# Patient Record
Sex: FEMALE | Race: WHITE | NOT HISPANIC OR LATINO | Employment: UNEMPLOYED | URBAN - METROPOLITAN AREA
[De-identification: names, ages, dates, MRNs, and addresses within clinical notes are randomized per-mention and may not be internally consistent; named-entity substitution may affect disease eponyms.]

---

## 2018-06-13 ENCOUNTER — TRANSCRIBE ORDERS (OUTPATIENT)
Dept: ADMINISTRATIVE | Facility: HOSPITAL | Age: 58
End: 2018-06-13

## 2018-06-13 DIAGNOSIS — Z12.39 SCREENING BREAST EXAMINATION: Primary | ICD-10-CM

## 2018-07-27 ENCOUNTER — HOSPITAL ENCOUNTER (OUTPATIENT)
Dept: MAMMOGRAPHY | Facility: HOSPITAL | Age: 58
Discharge: HOME | End: 2018-07-27
Admitting: FAMILY MEDICINE

## 2018-07-27 DIAGNOSIS — Z12.39 SCREENING BREAST EXAMINATION: ICD-10-CM

## 2018-07-27 PROCEDURE — 77067 SCR MAMMO BI INCL CAD: CPT

## 2018-09-07 ENCOUNTER — TRANSCRIBE ORDERS (OUTPATIENT)
Dept: ADMINISTRATIVE | Facility: HOSPITAL | Age: 58
End: 2018-09-07

## 2018-09-07 DIAGNOSIS — R94.5 NONSPECIFIC ABNORMAL RESULTS OF LIVER FUNCTION STUDY: Primary | ICD-10-CM

## 2018-09-14 ENCOUNTER — TRANSCRIBE ORDERS (OUTPATIENT)
Dept: ADMINISTRATIVE | Facility: HOSPITAL | Age: 58
End: 2018-09-14

## 2018-09-14 ENCOUNTER — HOSPITAL ENCOUNTER (OUTPATIENT)
Dept: ULTRASOUND IMAGING | Facility: HOSPITAL | Age: 58
Discharge: HOME | End: 2018-09-14
Admitting: FAMILY MEDICINE

## 2018-09-14 DIAGNOSIS — M25.569 KNEE PAIN, UNSPECIFIED CHRONICITY, UNSPECIFIED LATERALITY: Primary | ICD-10-CM

## 2018-09-14 DIAGNOSIS — R94.5 NONSPECIFIC ABNORMAL RESULTS OF LIVER FUNCTION STUDY: ICD-10-CM

## 2018-09-14 PROCEDURE — 76700 US EXAM ABDOM COMPLETE: CPT

## 2018-09-18 ENCOUNTER — HOSPITAL ENCOUNTER (OUTPATIENT)
Dept: MRI IMAGING | Facility: HOSPITAL | Age: 58
Discharge: HOME | End: 2018-09-18
Attending: ORTHOPAEDIC SURGERY | Admitting: ORTHOPAEDIC SURGERY

## 2018-09-18 DIAGNOSIS — M25.569 KNEE PAIN, UNSPECIFIED CHRONICITY, UNSPECIFIED LATERALITY: ICD-10-CM

## 2018-09-18 PROCEDURE — 73721 MRI JNT OF LWR EXTRE W/O DYE: CPT

## 2018-11-15 ENCOUNTER — CONSULT (OUTPATIENT)
Dept: ONCOLOGY | Facility: CLINIC | Age: 58
End: 2018-11-15

## 2018-11-15 ENCOUNTER — LAB (OUTPATIENT)
Dept: OTHER | Facility: HOSPITAL | Age: 58
End: 2018-11-15

## 2018-11-15 VITALS
RESPIRATION RATE: 16 BRPM | TEMPERATURE: 97.9 F | HEART RATE: 77 BPM | OXYGEN SATURATION: 99 % | WEIGHT: 173 LBS | SYSTOLIC BLOOD PRESSURE: 132 MMHG | DIASTOLIC BLOOD PRESSURE: 80 MMHG | BODY MASS INDEX: 28.82 KG/M2 | HEIGHT: 65 IN

## 2018-11-15 DIAGNOSIS — Z83.2 FAMILY HISTORY OF PROTHROMBIN GENE MUTATION: ICD-10-CM

## 2018-11-15 DIAGNOSIS — R74.8 ELEVATED LIVER ENZYMES: ICD-10-CM

## 2018-11-15 DIAGNOSIS — E83.110 HEREDITARY HEMOCHROMATOSIS (HCC): Primary | ICD-10-CM

## 2018-11-15 DIAGNOSIS — R89.9 ABNORMAL LABORATORY TEST: Primary | ICD-10-CM

## 2018-11-15 LAB
BASOPHILS # BLD AUTO: 0.06 10*3/MM3 (ref 0–0.2)
BASOPHILS NFR BLD AUTO: 0.5 % (ref 0–1.5)
DEPRECATED RDW RBC AUTO: 42.1 FL (ref 37–54)
EOSINOPHIL # BLD AUTO: 0.09 10*3/MM3 (ref 0–0.7)
EOSINOPHIL NFR BLD AUTO: 0.8 % (ref 0.3–6.2)
ERYTHROCYTE [DISTWIDTH] IN BLOOD BY AUTOMATED COUNT: 12.7 % (ref 11.7–13)
FACTOR II, DNA ANALYSIS: ABNORMAL
HCT VFR BLD AUTO: 48 % (ref 35.6–45.5)
HGB BLD-MCNC: 15.4 G/DL (ref 11.9–15.5)
IMM GRANULOCYTES # BLD: 0.13 10*3/MM3 (ref 0–0.03)
IMM GRANULOCYTES NFR BLD: 1.1 % (ref 0–0.5)
LYMPHOCYTES # BLD AUTO: 3.31 10*3/MM3 (ref 0.9–4.8)
LYMPHOCYTES NFR BLD AUTO: 28.1 % (ref 19.6–45.3)
MCH RBC QN AUTO: 28.9 PG (ref 26.9–32)
MCHC RBC AUTO-ENTMCNC: 32.1 G/DL (ref 32.4–36.3)
MCV RBC AUTO: 90.1 FL (ref 80.5–98.2)
MONOCYTES # BLD AUTO: 0.72 10*3/MM3 (ref 0.2–1.2)
MONOCYTES NFR BLD AUTO: 6.1 % (ref 5–12)
NEUTROPHILS # BLD AUTO: 7.47 10*3/MM3 (ref 1.9–8.1)
NEUTROPHILS NFR BLD AUTO: 63.4 % (ref 42.7–76)
NRBC BLD MANUAL-RTO: 0 /100 WBC (ref 0–0)
PLATELET # BLD AUTO: 210 10*3/MM3 (ref 140–500)
PMV BLD AUTO: 9.2 FL (ref 6–12)
RBC # BLD AUTO: 5.33 10*6/MM3 (ref 3.9–5.2)
WBC NRBC COR # BLD: 11.78 10*3/MM3 (ref 4.5–10.7)

## 2018-11-15 PROCEDURE — 81240 F2 GENE: CPT | Performed by: INTERNAL MEDICINE

## 2018-11-15 PROCEDURE — 85025 COMPLETE CBC W/AUTO DIFF WBC: CPT | Performed by: INTERNAL MEDICINE

## 2018-11-15 PROCEDURE — 36415 COLL VENOUS BLD VENIPUNCTURE: CPT

## 2018-11-15 PROCEDURE — 99205 OFFICE O/P NEW HI 60 MIN: CPT | Performed by: INTERNAL MEDICINE

## 2018-11-15 RX ORDER — SODIUM CHLORIDE 9 MG/ML
250 INJECTION, SOLUTION INTRAVENOUS ONCE
Status: CANCELLED | OUTPATIENT
Start: 2018-11-28

## 2018-11-15 RX ORDER — CHOLECALCIFEROL (VITAMIN D3) 125 MCG
500 CAPSULE ORAL DAILY
COMMUNITY

## 2018-11-15 RX ORDER — ESOMEPRAZOLE MAGNESIUM 40 MG/1
40 CAPSULE, DELAYED RELEASE ORAL
COMMUNITY

## 2018-11-15 RX ORDER — CHOLECALCIFEROL (VITAMIN D3) 125 MCG
CAPSULE ORAL
COMMUNITY

## 2018-11-15 RX ORDER — ATORVASTATIN CALCIUM 40 MG/1
40 TABLET, FILM COATED ORAL DAILY
COMMUNITY

## 2018-11-15 RX ORDER — RANITIDINE 150 MG/1
150 TABLET ORAL 2 TIMES DAILY
COMMUNITY
End: 2022-11-26

## 2018-11-15 RX ORDER — TOPIRAMATE 50 MG/1
50 TABLET, FILM COATED ORAL 2 TIMES DAILY
COMMUNITY

## 2018-11-15 NOTE — PROGRESS NOTES
.     REASON FOR CONSULTATION:     Provide an opinion on any further workup or treatment of hereditary hemochromatosis.                              REQUESTING PHYSICIAN: Gage Colon MD     RECORDS OBTAINED:  Records of the patients history including those obtained from the referring provider were reviewed and summarized in detail.    HISTORY OF PRESENT ILLNESS:  The patient is a 58 y.o. year old female who is here for initial evaluation and treatment plan for newly diagnosed hereditary hemochromatosis.  This patient has a family history of hereditary hemachromatosis and her mother also had factor II/prothrombin gene mutation and the DVT.  This patient had examination at her primary care physician, Dr. Colon’ office, on 09/04/2018. Laboratory study at that time reported abnormal liver function panel including AST 86, ALT 99, alkaline phosphatase 121 with normal total bilirubin 0.4. She had total serum protein 7.7 and albumin 4.4. Her electrolytes were normal. Patient had elevated glucose 231. Her CBC showed mild erythrocytosis with RBC 5.3 million/mcL, upper normal hematocrit of 46.6% and hemoglobin 15.3%. Normal platelets at 87,000. Normal WBC 8400 including neutrophils 4100 and lymphocytes 3300, monocytes 500. Her ferritin level was elevated at 621 ng/mL, iron saturation 35% with free iron 406 and TIBC 302.  Normal B12 level 945, folic acid 11.5.  Hemoglobin A1c 7.0 and vitamin D 25.  Further laboratory study around 09/07/2018 reported negative for hepatitis B surface antigen, hepatitis B surface antibody, HCV antibody, HIV, and normal GGT 51.      Further studies on 10/16/2018 reported positive for homozygous HFE mutation at H63D.      This patient reports she has a family history of hemochromatosis and also prothrombin gene mutation.  Her mother had DVT and was found having factor II/prothrombin gene mutation.  One of her brothers and 1 of her sisters have hereditary hemochromatosis and both of them  are high patient for treatment with frequent phlebotomies.  The patient herself has never had thrombophilia previously.    The patient reports she had 2 term pregnancies.  She had menopause about 15 years ago.  She denies bleeding of any kind.  She was diagnosed with diabetes about 1-1/2 years ago.      The patient reports poor vision and intermittent diarrhea.  She has chronic low back pain and joint pain.  She has significant headaches.  No fainting or syncope.  She denies fevers, sweating, chills.          Past Medical History:   Diagnosis Date   • Allergic rhinitis    • Anemia    • Colon cancer (CMS/HCC)    • Depression    • Diabetes mellitus (CMS/HCC)    • Disease of thyroid gland    • GERD (gastroesophageal reflux disease)    • Hyperlipidemia    • IBS (irritable bowel syndrome)    • Iron overload    • Malignant neoplasm of cervix (CMS/HCC)    • Multiple sclerosis (CMS/HCC)    • RLS (restless legs syndrome)    • Seizures (CMS/HCC)    • Sensory neuropathy    • Vitamin D deficiency      Past Surgical History:   Procedure Laterality Date   •  SECTION      x2   • CYSTOSCOPY URETERAL BALLOON DILATATION      x4   • ELBOW PROCEDURE     • STOMACH SURGERY         HEMATOLOGIC/ONCOLOGIC HISTORY:  (History from previous dates can be found in the separate document.)    MEDICATIONS    Current Outpatient Medications:   •  amitriptyline (ELAVIL) 100 MG tablet, Take  by mouth., Disp: , Rfl:   •  atorvastatin (LIPITOR) 40 MG tablet, Take 40 mg by mouth Daily., Disp: , Rfl:   •  Butalbital-APAP-Caffeine -40 MG per capsule, Take  by mouth., Disp: , Rfl:   •  Cholecalciferol (VITAMIN D3) 2000 units tablet, Take  by mouth., Disp: , Rfl:   •  esomeprazole (nexIUM) 40 MG capsule, Take 40 mg by mouth Every Morning Before Breakfast., Disp: , Rfl:   •  gabapentin (NEURONTIN) 300 MG capsule, Take  by mouth., Disp: , Rfl:   •  levothyroxine (SYNTHROID, LEVOTHROID) 100 MCG tablet, Take  by mouth., Disp: , Rfl:   •  linagliptin  (TRADJENTA) 5 MG tablet tablet, Take 5 mg by mouth Daily., Disp: , Rfl:   •  metFORMIN (GLUCOPHAGE) 1000 MG tablet, Take 1,000 mg by mouth 2 (Two) Times a Day With Meals., Disp: , Rfl:   •  montelukast (SINGULAIR) 4 MG pack, Take  by mouth., Disp: , Rfl:   •  Omega-3 Fatty Acids (OMEGA 3 PO), Take  by mouth., Disp: , Rfl:   •  pramipexole (MIRAPEX) 0.125 MG tablet, Take 0.125 mg by mouth every night., Disp: , Rfl:   •  raNITIdine (ZANTAC) 150 MG tablet, Take 150 mg by mouth 2 (Two) Times a Day., Disp: , Rfl:   •  topiramate (TOPAMAX) 50 MG tablet, Take 50 mg by mouth 2 (Two) Times a Day., Disp: , Rfl:   •  vitamin B-12 (CYANOCOBALAMIN) 500 MCG tablet, Take 500 mcg by mouth Daily., Disp: , Rfl:     ALLERGIES:     Allergies   Allergen Reactions   • Sulfa Antibiotics        SOCIAL HISTORY:       Social History     Socioeconomic History   • Marital status: Single     Spouse name: Not on file   • Number of children: 2    • Years of education: High school education    • Highest education level: High school    Social Needs   • Financial resource strain: Not on file   • Food insecurity - worry: Not on file   • Food insecurity - inability: Not on file   • Transportation needs - medical: Not on file   • Transportation needs - non-medical: Not on file   Occupational History     Employer: DISABLED, previous had factory job    Tobacco Use   • Smoking status: Never Smoker   Substance and Sexual Activity   • Alcohol use: No   • Drug use: No    • Sexual activity: Not on file         FAMILY HISTORY:  Family History   Problem Relation Age of Onset   • Breast cancer Maternal Aunt    • Diabetes Other    • Heart disease Other    • Heart disease Mother         CABG x3   • Diabetes Mother         Type 2   • Lung cancer Father    • Liver cancer Brother      1 brother has hereditary hemochromatosis, liver cancer because HH post liver transplant, he is undergoing phlebotomy treatment.  A sister has hemochromatosis undergoing phlebotomy  "treatment.  Mother had a DVT, found to have prothrombin gene mutation.    REVIEW OF SYSTEMS:  Review of Systems   Constitutional: Positive for unexpected weight change (Weight gain). Negative for appetite change, diaphoresis and fatigue.   HENT: Negative for hearing loss, nosebleeds and sore throat.    Eyes: Positive for visual disturbance (Poor vision). Negative for photophobia.   Respiratory: Negative for cough, chest tightness and shortness of breath.    Cardiovascular: Negative for chest pain, palpitations and leg swelling.   Gastrointestinal: Positive for diarrhea. Negative for abdominal pain, blood in stool, constipation and nausea.   Endocrine: Negative for polyuria.   Genitourinary: Negative for dysuria and hematuria.   Musculoskeletal: Positive for arthralgias and back pain.   Skin: Negative for color change and rash.   Allergic/Immunologic: Negative for food allergies.   Neurological: Positive for weakness (Difficulty in walking) and headaches.   Hematological: Negative for adenopathy. Does not bruise/bleed easily.   Psychiatric/Behavioral: Negative for agitation and confusion.              Vitals:    11/15/18 1543   BP: 132/80   Pulse: 77   Resp: 16   Temp: 97.9 °F (36.6 °C)   SpO2: 99%  Comment: at rest   Weight: 78.5 kg (173 lb)  Comment: weigh with foot brace and boots   Height: 165 cm (64.96\")   PainSc:   8   PainLoc: Comment: all over     Current Status 11/15/2018   ECOG score 0      PHYSICAL EXAM:      CONSTITUTIONAL:  Well-developed well-nourished female.  No distress, looks comfortable.  EYES:  Conjunctiva and lids unremarkable.  PERRLA  EARS,NOSE,MOUTH,THROAT:  Ears and nose appear unremarkable.  Oral mucosa moist.  Lips appear unremarkable.  RESPIRATORY:  Normal respiratory effort.  Lungs clear to auscultation bilaterally.  CARDIOVASCULAR: Regular rhythm and rate.  Normal S1, S2.  No murmurs rubs or gallops.  No significant lower extremity edema.  GASTROINTESTINAL: Abdomen appears unremarkable.  " Nontender.  No hepatomegaly.  No splenomegaly.  Bowel sounds normal  LYMPHATIC:  No cervical, supraclavicular, axillary lymphadenopathy.  MUSCULOSKELETAL:  Unremarkable gait.  Unremarkable digits/nails.  No cyanosis or clubbing.  SKIN:  Warm.  No rashes.  The skin pigmentation.   PSYCHIATRIC:  Normal judgment and insight.  Normal mood and affect.      RECENT LABS:        WBC   Date Value Ref Range Status   11/15/2018 11.78 (H) 4.50 - 10.70 10*3/mm3 Final     Hemoglobin   Date Value Ref Range Status   11/15/2018 15.4 11.9 - 15.5 g/dL Final     Platelets   Date Value Ref Range Status   11/15/2018 210 140 - 500 10*3/mm3 Final     Ferritin 245 on 10/16/2018.        Assessment/Plan      1.  Hereditary hemochromatosis.  I discussed with the patient that she will need to start phlebotomy treatment because of elevated ferritin and more importantly she has evidence of liver injury from iron overload.  Because of the weather, it started snowing and she is the last patient this afternoon and that we would not be able to give her extra IV fluids, I think after phlebotomy she might have dizziness and would not be able to drive safely.  So I decided not to do phlebotomy today, instead to start her on phlebotomy after the Thanksgiving holiday.  I think she will need 2 phlebotomies to begin with and I will bring her back in 5 weeks for reevaluation.  We will check her laboratory studies the 1st day of phlebotomy including repeat iron panel, ferritin, CBC and CMP.  I will also recheck her iron when she comes back to see me in 5 weeks to assess her response to the phlebotomy treatment.  I explained to the patient the strict target would be ferritin 50, however, not every patient can tolerate that target. Of course we also have to look at other things such as her liver function panel and her physical tolerance to the phlebotomy treatment.  She voiced understanding.      2.  Family history of prothrombin gene mutation.  Her mother had  DVT.  I will check this patient for prothrombin gene mutation. This patient has no previous history of thrombosis.    3.  Patient will return in 5 weeks for M.D. evaluation, we'll check CBC and ferritin and iron profile.      TAQUERIA ZURITA M.D., Ph.D.    11/15/2018     CC:  Gage Cloon MD     Dictated using Dragon Dictation.

## 2018-11-28 ENCOUNTER — INFUSION (OUTPATIENT)
Dept: ONCOLOGY | Facility: HOSPITAL | Age: 58
End: 2018-11-28

## 2018-11-28 ENCOUNTER — LAB (OUTPATIENT)
Dept: OTHER | Facility: HOSPITAL | Age: 58
End: 2018-11-28

## 2018-11-28 VITALS
DIASTOLIC BLOOD PRESSURE: 75 MMHG | BODY MASS INDEX: 28.66 KG/M2 | WEIGHT: 172 LBS | HEART RATE: 75 BPM | TEMPERATURE: 97.8 F | OXYGEN SATURATION: 95 % | SYSTOLIC BLOOD PRESSURE: 113 MMHG

## 2018-11-28 DIAGNOSIS — E83.110 HEREDITARY HEMOCHROMATOSIS (HCC): ICD-10-CM

## 2018-11-28 DIAGNOSIS — E83.110 HEREDITARY HEMOCHROMATOSIS (HCC): Primary | ICD-10-CM

## 2018-11-28 DIAGNOSIS — R74.8 ELEVATED LIVER ENZYMES: ICD-10-CM

## 2018-11-28 LAB
ALBUMIN SERPL-MCNC: 4.1 G/DL (ref 3.5–5.2)
ALBUMIN/GLOB SERPL: 1.1 G/DL
ALP SERPL-CCNC: 118 U/L (ref 39–117)
ALT SERPL W P-5'-P-CCNC: 50 U/L (ref 1–33)
ANION GAP SERPL CALCULATED.3IONS-SCNC: 13 MMOL/L
AST SERPL-CCNC: 30 U/L (ref 1–32)
BASOPHILS # BLD AUTO: 0.04 10*3/MM3 (ref 0–0.2)
BASOPHILS NFR BLD AUTO: 0.6 % (ref 0–1.5)
BILIRUB SERPL-MCNC: 0.3 MG/DL (ref 0.1–1.2)
BUN BLD-MCNC: 16 MG/DL (ref 6–20)
BUN/CREAT SERPL: 25 (ref 7–25)
CALCIUM SPEC-SCNC: 9.6 MG/DL (ref 8.6–10.5)
CHLORIDE SERPL-SCNC: 106 MMOL/L (ref 98–107)
CO2 SERPL-SCNC: 20 MMOL/L (ref 22–29)
CREAT BLD-MCNC: 0.64 MG/DL (ref 0.57–1)
DEPRECATED RDW RBC AUTO: 41.4 FL (ref 37–54)
EOSINOPHIL # BLD AUTO: 0.07 10*3/MM3 (ref 0–0.7)
EOSINOPHIL NFR BLD AUTO: 1.1 % (ref 0.3–6.2)
ERYTHROCYTE [DISTWIDTH] IN BLOOD BY AUTOMATED COUNT: 12.6 % (ref 11.7–13)
FERRITIN SERPL-MCNC: 270.3 NG/ML (ref 13–150)
GFR SERPL CREATININE-BSD FRML MDRD: 96 ML/MIN/1.73
GLOBULIN UR ELPH-MCNC: 3.9 GM/DL
GLUCOSE BLD-MCNC: 198 MG/DL (ref 65–99)
HCT VFR BLD AUTO: 46.4 % (ref 35.6–45.5)
HGB BLD-MCNC: 15 G/DL (ref 11.9–15.5)
IMM GRANULOCYTES # BLD: 0.04 10*3/MM3 (ref 0–0.03)
IMM GRANULOCYTES NFR BLD: 0.6 % (ref 0–0.5)
IRON 24H UR-MRATE: 79 MCG/DL (ref 37–145)
IRON SATN MFR SERPL: 20 % (ref 20–50)
LYMPHOCYTES # BLD AUTO: 2.36 10*3/MM3 (ref 0.9–4.8)
LYMPHOCYTES NFR BLD AUTO: 36.3 % (ref 19.6–45.3)
MCH RBC QN AUTO: 28.8 PG (ref 26.9–32)
MCHC RBC AUTO-ENTMCNC: 32.3 G/DL (ref 32.4–36.3)
MCV RBC AUTO: 89.2 FL (ref 80.5–98.2)
MONOCYTES # BLD AUTO: 0.44 10*3/MM3 (ref 0.2–1.2)
MONOCYTES NFR BLD AUTO: 6.8 % (ref 5–12)
NEUTROPHILS # BLD AUTO: 3.55 10*3/MM3 (ref 1.9–8.1)
NEUTROPHILS NFR BLD AUTO: 54.6 % (ref 42.7–76)
NRBC BLD MANUAL-RTO: 0 /100 WBC (ref 0–0)
PLATELET # BLD AUTO: 275 10*3/MM3 (ref 140–500)
PMV BLD AUTO: 8.6 FL (ref 6–12)
POTASSIUM BLD-SCNC: 4.1 MMOL/L (ref 3.5–5.2)
PROT SERPL-MCNC: 8 G/DL (ref 6–8.5)
RBC # BLD AUTO: 5.2 10*6/MM3 (ref 3.9–5.2)
SODIUM BLD-SCNC: 139 MMOL/L (ref 136–145)
TIBC SERPL-MCNC: 386 MCG/DL (ref 298–536)
TRANSFERRIN SERPL-MCNC: 259 MG/DL (ref 200–360)
WBC NRBC COR # BLD: 6.5 10*3/MM3 (ref 4.5–10.7)

## 2018-11-28 PROCEDURE — 82728 ASSAY OF FERRITIN: CPT | Performed by: INTERNAL MEDICINE

## 2018-11-28 PROCEDURE — 85025 COMPLETE CBC W/AUTO DIFF WBC: CPT | Performed by: INTERNAL MEDICINE

## 2018-11-28 PROCEDURE — 84466 ASSAY OF TRANSFERRIN: CPT | Performed by: INTERNAL MEDICINE

## 2018-11-28 PROCEDURE — 83540 ASSAY OF IRON: CPT | Performed by: INTERNAL MEDICINE

## 2018-11-28 PROCEDURE — 36415 COLL VENOUS BLD VENIPUNCTURE: CPT

## 2018-11-28 PROCEDURE — 80053 COMPREHEN METABOLIC PANEL: CPT | Performed by: INTERNAL MEDICINE

## 2018-11-28 PROCEDURE — 99195 PHLEBOTOMY: CPT | Performed by: NURSE PRACTITIONER

## 2018-11-28 RX ORDER — SODIUM CHLORIDE 9 MG/ML
250 INJECTION, SOLUTION INTRAVENOUS ONCE
Status: COMPLETED | OUTPATIENT
Start: 2018-11-28 | End: 2018-11-28

## 2018-11-28 RX ORDER — SODIUM CHLORIDE 9 MG/ML
250 INJECTION, SOLUTION INTRAVENOUS ONCE
Status: CANCELLED | OUTPATIENT
Start: 2018-11-28

## 2018-11-28 RX ADMIN — SODIUM CHLORIDE 250 ML: 900 INJECTION, SOLUTION INTRAVENOUS at 13:10

## 2018-11-28 NOTE — PROGRESS NOTES
Pt here for first phlebotomy. Per Dr Waddell note, adminestered 250cc NS following phlebo. Pt tolerated treatment well. She inquired about labs done at last MD visit, specifically Factor II eval. Reviewed results with Dr. Greenberg. Pt does have mutation. Per Dr. Greenberg, reviewed result with patient and discussed that there is not need for treatment and as of now, the only intervention would be to avoid estrogen containing medications. Pt states her sister has this same mutation and that was the information she had also shared with her. Given copy of labs from today, as well as Factor II result.

## 2018-12-07 DIAGNOSIS — R74.8 ELEVATED LIVER ENZYMES: ICD-10-CM

## 2018-12-07 DIAGNOSIS — E83.110 HEREDITARY HEMOCHROMATOSIS (HCC): Primary | ICD-10-CM

## 2018-12-07 DIAGNOSIS — Z83.2 FAMILY HISTORY OF PROTHROMBIN GENE MUTATION: ICD-10-CM

## 2018-12-11 ENCOUNTER — INFUSION (OUTPATIENT)
Dept: ONCOLOGY | Facility: HOSPITAL | Age: 58
End: 2018-12-11

## 2018-12-11 ENCOUNTER — LAB (OUTPATIENT)
Dept: OTHER | Facility: HOSPITAL | Age: 58
End: 2018-12-11

## 2018-12-11 VITALS — HEART RATE: 80 BPM | DIASTOLIC BLOOD PRESSURE: 70 MMHG | TEMPERATURE: 98.1 F | SYSTOLIC BLOOD PRESSURE: 113 MMHG

## 2018-12-11 VITALS — BODY MASS INDEX: 26.66 KG/M2 | WEIGHT: 160 LBS

## 2018-12-11 DIAGNOSIS — E83.110 HEREDITARY HEMOCHROMATOSIS (HCC): ICD-10-CM

## 2018-12-11 PROCEDURE — 99195 PHLEBOTOMY: CPT | Performed by: NURSE PRACTITIONER

## 2018-12-20 ENCOUNTER — OFFICE VISIT (OUTPATIENT)
Dept: ONCOLOGY | Facility: CLINIC | Age: 58
End: 2018-12-20

## 2018-12-20 ENCOUNTER — INFUSION (OUTPATIENT)
Dept: ONCOLOGY | Facility: HOSPITAL | Age: 58
End: 2018-12-20

## 2018-12-20 ENCOUNTER — LAB (OUTPATIENT)
Dept: OTHER | Facility: HOSPITAL | Age: 58
End: 2018-12-20

## 2018-12-20 VITALS
HEIGHT: 65 IN | RESPIRATION RATE: 18 BRPM | OXYGEN SATURATION: 98 % | TEMPERATURE: 98.6 F | SYSTOLIC BLOOD PRESSURE: 109 MMHG | DIASTOLIC BLOOD PRESSURE: 76 MMHG | HEART RATE: 81 BPM | BODY MASS INDEX: 29.09 KG/M2 | WEIGHT: 174.6 LBS

## 2018-12-20 VITALS — HEART RATE: 83 BPM | DIASTOLIC BLOOD PRESSURE: 64 MMHG | SYSTOLIC BLOOD PRESSURE: 98 MMHG

## 2018-12-20 DIAGNOSIS — R74.8 ELEVATED LIVER ENZYMES: ICD-10-CM

## 2018-12-20 DIAGNOSIS — E83.110 HEREDITARY HEMOCHROMATOSIS (HCC): Primary | ICD-10-CM

## 2018-12-20 DIAGNOSIS — Z83.2 FAMILY HISTORY OF PROTHROMBIN GENE MUTATION: ICD-10-CM

## 2018-12-20 DIAGNOSIS — E83.110 HEREDITARY HEMOCHROMATOSIS (HCC): ICD-10-CM

## 2018-12-20 DIAGNOSIS — D68.52 PROTHROMBIN GENE MUTATION (HCC): ICD-10-CM

## 2018-12-20 LAB
ALBUMIN SERPL-MCNC: 4.3 G/DL (ref 3.5–5.2)
ALBUMIN/GLOB SERPL: 1.3 G/DL
ALP SERPL-CCNC: 116 U/L (ref 39–117)
ALT SERPL W P-5'-P-CCNC: 28 U/L (ref 1–33)
ANION GAP SERPL CALCULATED.3IONS-SCNC: 13.1 MMOL/L
AST SERPL-CCNC: 21 U/L (ref 1–32)
BASOPHILS # BLD AUTO: 0.04 10*3/MM3 (ref 0–0.2)
BASOPHILS NFR BLD AUTO: 0.5 % (ref 0–1.5)
BILIRUB SERPL-MCNC: 0.4 MG/DL (ref 0.1–1.2)
BUN BLD-MCNC: 16 MG/DL (ref 6–20)
BUN/CREAT SERPL: 21.3 (ref 7–25)
CALCIUM SPEC-SCNC: 9.6 MG/DL (ref 8.6–10.5)
CHLORIDE SERPL-SCNC: 107 MMOL/L (ref 98–107)
CO2 SERPL-SCNC: 22.9 MMOL/L (ref 22–29)
CREAT BLD-MCNC: 0.75 MG/DL (ref 0.57–1)
DEPRECATED RDW RBC AUTO: 44.5 FL (ref 37–54)
EOSINOPHIL # BLD AUTO: 0.15 10*3/MM3 (ref 0–0.7)
EOSINOPHIL NFR BLD AUTO: 1.9 % (ref 0.3–6.2)
ERYTHROCYTE [DISTWIDTH] IN BLOOD BY AUTOMATED COUNT: 13.5 % (ref 11.7–13)
FERRITIN SERPL-MCNC: 64.3 NG/ML (ref 13–150)
GFR SERPL CREATININE-BSD FRML MDRD: 80 ML/MIN/1.73
GLOBULIN UR ELPH-MCNC: 3.3 GM/DL
GLUCOSE BLD-MCNC: 97 MG/DL (ref 65–99)
HCT VFR BLD AUTO: 41 % (ref 35.6–45.5)
HGB BLD-MCNC: 13.3 G/DL (ref 11.9–15.5)
IMM GRANULOCYTES # BLD AUTO: 0.03 10*3/MM3 (ref 0–0.03)
IMM GRANULOCYTES NFR BLD AUTO: 0.4 % (ref 0–0.5)
IRON 24H UR-MRATE: 71 MCG/DL (ref 37–145)
IRON SATN MFR SERPL: 19 % (ref 20–50)
LYMPHOCYTES # BLD AUTO: 2.69 10*3/MM3 (ref 0.9–4.8)
LYMPHOCYTES NFR BLD AUTO: 33.6 % (ref 19.6–45.3)
MCH RBC QN AUTO: 29.2 PG (ref 26.9–32)
MCHC RBC AUTO-ENTMCNC: 32.4 G/DL (ref 32.4–36.3)
MCV RBC AUTO: 90.1 FL (ref 80.5–98.2)
MONOCYTES # BLD AUTO: 0.63 10*3/MM3 (ref 0.2–1.2)
MONOCYTES NFR BLD AUTO: 7.9 % (ref 5–12)
NEUTROPHILS # BLD AUTO: 4.47 10*3/MM3 (ref 1.9–8.1)
NEUTROPHILS NFR BLD AUTO: 55.7 % (ref 42.7–76)
NRBC BLD AUTO-RTO: 0 /100 WBC (ref 0–0)
PLATELET # BLD AUTO: 237 10*3/MM3 (ref 140–500)
PMV BLD AUTO: 8.5 FL (ref 6–12)
POTASSIUM BLD-SCNC: 4.1 MMOL/L (ref 3.5–5.2)
PROT SERPL-MCNC: 7.6 G/DL (ref 6–8.5)
RBC # BLD AUTO: 4.55 10*6/MM3 (ref 3.9–5.2)
SODIUM BLD-SCNC: 143 MMOL/L (ref 136–145)
TIBC SERPL-MCNC: 375 MCG/DL (ref 298–536)
TRANSFERRIN SERPL-MCNC: 252 MG/DL (ref 200–360)
WBC NRBC COR # BLD: 8.01 10*3/MM3 (ref 4.5–10.7)

## 2018-12-20 PROCEDURE — 96360 HYDRATION IV INFUSION INIT: CPT | Performed by: INTERNAL MEDICINE

## 2018-12-20 PROCEDURE — 82728 ASSAY OF FERRITIN: CPT | Performed by: INTERNAL MEDICINE

## 2018-12-20 PROCEDURE — 83540 ASSAY OF IRON: CPT | Performed by: INTERNAL MEDICINE

## 2018-12-20 PROCEDURE — 99214 OFFICE O/P EST MOD 30 MIN: CPT | Performed by: INTERNAL MEDICINE

## 2018-12-20 PROCEDURE — 80053 COMPREHEN METABOLIC PANEL: CPT | Performed by: INTERNAL MEDICINE

## 2018-12-20 PROCEDURE — 84466 ASSAY OF TRANSFERRIN: CPT | Performed by: INTERNAL MEDICINE

## 2018-12-20 PROCEDURE — 85025 COMPLETE CBC W/AUTO DIFF WBC: CPT | Performed by: INTERNAL MEDICINE

## 2018-12-20 PROCEDURE — 36415 COLL VENOUS BLD VENIPUNCTURE: CPT

## 2018-12-20 RX ORDER — SODIUM CHLORIDE 9 MG/ML
250 INJECTION, SOLUTION INTRAVENOUS ONCE
Status: COMPLETED | OUTPATIENT
Start: 2018-12-20 | End: 2018-12-20

## 2018-12-20 RX ORDER — SODIUM CHLORIDE 9 MG/ML
250 INJECTION, SOLUTION INTRAVENOUS ONCE
Status: CANCELLED | OUTPATIENT
Start: 2018-12-20

## 2018-12-20 RX ADMIN — SODIUM CHLORIDE 250 ML: 900 INJECTION, SOLUTION INTRAVENOUS at 13:43

## 2018-12-20 NOTE — PROGRESS NOTES
.     REASON FOR CONSULTATION:     1. Hereditary hemochromatosis positive for homozygous HFE mutation H63D.  Patient had a elevated liver function panel, she was started on phlebotomy on 11/28/2018.   2.  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  Positive family history.   .                                  HISTORY OF PRESENT ILLNESS:  The patient is a 58 y.o. year old female who is here for reevaluation to assess her response to phlebotomy.  I saw her initially on 11/15/2018.  Since that time she had a 2 phlebotomy and IV normal saline infusion, and she has good tolerance.  She reports no fainting no dizziness.  This patient was also tested positive for heterozygous factor II mutation on 11/15/2018.    Pretreatment iron study reported at ferritin 270 ng/mL, free iron 79 TIBC 386 saturation 20% on 11/28/2018.  Hb 15.0, ,000 and WBC 6500.  Liver panel ALT 50 AST 30 alkaline phosphatase was 118 and the bilirubin 0.3.    Laboratory study today reported much improved ferritin 64.3, free iron 71 TIBC 375 and iron saturation 19%.  Hb 13.3, normal WBC and platelets.  Normalized ALT 28, AST 21, alkaline phosphatase is 116 and total bilirubin 0.4.    The patient reports poor vision and intermittent diarrhea.  She has chronic low back pain and joint pain.  She has significant headaches.  No fainting or syncope.  She denies fevers, sweating, chills.          Past Medical History:   Diagnosis Date   • Allergic rhinitis    • Anemia    • Colon cancer (CMS/HCC)    • Depression    • Diabetes mellitus (CMS/HCC)    • Disease of thyroid gland    • GERD (gastroesophageal reflux disease)    • Hyperlipidemia    • IBS (irritable bowel syndrome)    • Iron overload    • Malignant neoplasm of cervix (CMS/HCC)    • Multiple sclerosis (CMS/HCC)    • RLS (restless legs syndrome)    • Seizures (CMS/HCC)    • Sensory neuropathy    • Vitamin D deficiency      Past Surgical  History:   Procedure Laterality Date   •  SECTION      x2   • CYSTOSCOPY URETERAL BALLOON DILATATION      x4   • ELBOW PROCEDURE     • STOMACH SURGERY         HEMATOLOGIC/ONCOLOGIC HISTORY:  The patient is a 58 y.o. year old female who is here on 11/15/2008 for initial evaluation and treatment plan for newly diagnosed hereditary hemochromatosis.      This patient has a family history of hereditary hemachromatosis and her mother also had factor II/prothrombin gene mutation and history of DVT.  This patient had examination at her primary care physician, Dr. Colon’ office, on 2018. Laboratory study at that time reported abnormal liver function panel including AST 86, ALT 99, alkaline phosphatase 121 with normal total bilirubin 0.4. She had total serum protein 7.7 and albumin 4.4. Her electrolytes were normal. Patient had elevated glucose 231. Her CBC showed mild erythrocytosis with RBC 5.3 million/mcL, upper normal hematocrit of 46.6% and hemoglobin 15.3%. Normal platelets at 87,000. Normal WBC 8400 including neutrophils 4100 and lymphocytes 3300, monocytes 500. Her ferritin level was elevated at 621 ng/mL, iron saturation 35% with free iron 406 and TIBC 302.  Normal B12 level 945, folic acid 11.5.  Hemoglobin A1c 7.0 and vitamin D 25.  Further laboratory study around 2018 reported negative for hepatitis B surface antigen, hepatitis B surface antibody, HCV antibody, HIV, and normal GGT 51.      Further studies on 10/16/2018 reported positive for homozygous HFE mutation at H63D.      This patient reports she has a family history of hemochromatosis and also prothrombin gene mutation.  Her mother had DVT and was found having factor II/prothrombin gene mutation.  One of her brothers and 1 of her sisters have hereditary hemochromatosis and both of them are high patient for treatment with frequent phlebotomies.  The patient herself has never had thrombophilia previously.    The patient reports she had 2  term pregnancies.  She had menopause about 15 years ago.  She denies bleeding of any kind.  She was diagnosed with diabetes about 1-1/2 years ago.      The patient reports poor vision and intermittent diarrhea.  She has chronic low back pain and joint pain.  She has significant headaches.  No fainting or syncope.  She denies fevers, sweating, chills.       MEDICATIONS    Current Outpatient Medications:   •  amitriptyline (ELAVIL) 100 MG tablet, Take  by mouth., Disp: , Rfl:   •  atorvastatin (LIPITOR) 40 MG tablet, Take 40 mg by mouth Daily., Disp: , Rfl:   •  Butalbital-APAP-Caffeine -40 MG per capsule, Take  by mouth., Disp: , Rfl:   •  Cholecalciferol (VITAMIN D3) 2000 units tablet, Take  by mouth., Disp: , Rfl:   •  esomeprazole (nexIUM) 40 MG capsule, Take 40 mg by mouth Every Morning Before Breakfast., Disp: , Rfl:   •  gabapentin (NEURONTIN) 300 MG capsule, Take  by mouth., Disp: , Rfl:   •  levothyroxine (SYNTHROID, LEVOTHROID) 100 MCG tablet, Take  by mouth., Disp: , Rfl:   •  linagliptin (TRADJENTA) 5 MG tablet tablet, Take 5 mg by mouth Daily., Disp: , Rfl:   •  metFORMIN (GLUCOPHAGE) 1000 MG tablet, Take 1,000 mg by mouth 2 (Two) Times a Day With Meals., Disp: , Rfl:   •  montelukast (SINGULAIR) 4 MG pack, Take  by mouth., Disp: , Rfl:   •  Omega-3 Fatty Acids (OMEGA 3 PO), Take  by mouth., Disp: , Rfl:   •  pramipexole (MIRAPEX) 0.125 MG tablet, Take 0.125 mg by mouth every night., Disp: , Rfl:   •  raNITIdine (ZANTAC) 150 MG tablet, Take 150 mg by mouth 2 (Two) Times a Day., Disp: , Rfl:   •  topiramate (TOPAMAX) 50 MG tablet, Take 50 mg by mouth 2 (Two) Times a Day., Disp: , Rfl:   •  vitamin B-12 (CYANOCOBALAMIN) 500 MCG tablet, Take 500 mcg by mouth Daily., Disp: , Rfl:   No current facility-administered medications for this visit.     Facility-Administered Medications Ordered in Other Visits:   •  Sodium chloride 0.9 % infusion 250 mL, 250 mL, Intravenous, Once, Marisel Greenberg MD  PhD    ALLERGIES:     Allergies   Allergen Reactions   • Sulfa Antibiotics        SOCIAL HISTORY:       Social History     Socioeconomic History   • Marital status: Single     Spouse name: Not on file   • Number of children: 2    • Years of education: High school education    • Highest education level: High school    Social Needs   • Financial resource strain: Not on file   • Food insecurity - worry: Not on file   • Food insecurity - inability: Not on file   • Transportation needs - medical: Not on file   • Transportation needs - non-medical: Not on file   Occupational History     Employer: DISABLED, previous had factory job    Tobacco Use   • Smoking status: Never Smoker   Substance and Sexual Activity   • Alcohol use: No   • Drug use: No    • Sexual activity: Not on file         FAMILY HISTORY:  Family History   Problem Relation Age of Onset   • Breast cancer Maternal Aunt    • Diabetes Other    • Heart disease Other    • Heart disease Mother         CABG x3   • Diabetes Mother         Type 2   • Hypertension Mother    • Lung cancer Father    • Liver cancer Brother 60   • Diabetes Brother      1 brother has hereditary hemochromatosis, liver cancer because HH post liver transplant, he is undergoing phlebotomy treatment.  A sister has hemochromatosis undergoing phlebotomy treatment.  Mother had a DVT, found to have prothrombin gene mutation.    REVIEW OF SYSTEMS:  Review of Systems   Constitutional: Positive for unexpected weight change (Weight gain). Negative for appetite change, diaphoresis and fatigue.   HENT: Negative for hearing loss, nosebleeds and sore throat.    Eyes: Positive for visual disturbance (Poor vision). Negative for photophobia.   Respiratory: Negative for cough, chest tightness and shortness of breath.    Cardiovascular: Negative for chest pain, palpitations and leg swelling.   Gastrointestinal: Positive for diarrhea. Negative for abdominal pain, blood in stool, constipation and nausea.  "  Endocrine: Negative for polyuria.   Genitourinary: Negative for dysuria and hematuria.   Musculoskeletal: Positive for arthralgias and back pain.   Skin: Negative for color change and rash.   Allergic/Immunologic: Negative for food allergies.   Neurological: Positive for weakness and headaches.   Hematological: Negative for adenopathy. Does not bruise/bleed easily.   Psychiatric/Behavioral: Negative for agitation and confusion.              Vitals:    12/20/18 1249   BP: 109/76   Pulse: 81   Resp: 18   Temp: 98.6 °F (37 °C)   SpO2: 98%   Weight: 79.2 kg (174 lb 9.6 oz)   Height: 165 cm (64.96\")   PainSc:   8   PainLoc: Comment: pt has neuropathy     Current Status 12/20/2018   ECOG score 0          PHYSICAL EXAM:      CONSTITUTIONAL:  Well-developed well-nourished female.  No distress, looks comfortable.  EYES:  Conjunctiva and lids unremarkable.  PERRLA  EARS,NOSE,MOUTH,THROAT:  nose appear unremarkable.  Oral mucosa moist.  Lips appear unremarkable.  RESPIRATORY:  Normal respiratory effort.  Lungs clear to auscultation bilaterally.  CARDIOVASCULAR: Regular rhythm and rate.  Normal S1, S2.  No murmurs.   GASTROINTESTINAL: Abdomen appears unremarkable.  Nontender.  No hepatomegaly.  No splenomegaly.  Bowel sounds normal.   LYMPHATIC:  No cervical, supraclavicular lymphadenopathy.  MUSCULOSKELETAL:  Normal gait.  No cyanosis or clubbing.   No significant lower extremity edema.   SKIN:  Warm.  No rashes.  The skin pigmentation.   PSYCHIATRIC:  Normal judgment and insight.  Normal mood and affect.      RECENT LABS:    Lab Results   Component Value Date    WBC 8.01 12/20/2018    HGB 13.3 12/20/2018    HCT 41.0 12/20/2018    MCV 90.1 12/20/2018     12/20/2018     Lab Results   Component Value Date    NEUTROABS 4.47 12/20/2018     Lab Results   Component Value Date    IRON 71 12/20/2018    TIBC 375 12/20/2018    FERRITIN 64.30 12/20/2018     Iron saturation 19%.        Assessment/Plan      1.  Hereditary " hemochromatosis.  Patient had a liver enzyme elevation and the time of diagnosis.  Her ferritin was elevated at up to 600 ng/mL, however was not as high as checked in our labs.  Nevertheless we started patient on phlebotomy and that she had a great response after 2 sessions of phlebotomy in the past one month.  Today repeat laboratory study showed ferritin 64 and iron saturation 19%.  I recommended to have 1 more phlebotomy and then monitor her iron labs every 6 months, with a targeted ferritin 50 ng/mL.  Patient voiced understanding.    2.  Positive for heterozygous factor II (prothrombin gene) mutation.  She presently had no thrombophilia.  She had a positive family history, with her mother having the same gene mutation and history of DVT.     Discussed with the patient I think that she would've benefited from low-dose aspirin 81 mg daily, not only because of her factor II gene mutation, but also she is diabetic and low-dose aspirin would be beneficial preventing stroke or heart attack.     I asked patient to be mobile to avoid blood a staggering in her leg to inform clots.  During travel such as on airplane or by automobile, she needs to walk around for a few minutes every 1-2 hours.  She voiced understanding.    3.  I discussed with the patient, from now we will monitor her iron study every 6 months, with a target of ferritin 50 ng/mL.  If it is higher than that, she would have phlebotomy.     I will bring her back for reevaluation in 12 months.      TAQUERIA ZURITA M.D., Ph.D.    12/20/2018     CC:  Gage Colon MD     Dictated using Dragon Dictation.

## 2019-05-03 ENCOUNTER — HOSPITAL ENCOUNTER (OUTPATIENT)
Dept: CARDIOLOGY | Facility: HOSPITAL | Age: 59
Discharge: HOME | End: 2019-05-03
Admitting: INTERNAL MEDICINE

## 2019-05-03 ENCOUNTER — HOSPITAL ENCOUNTER (OUTPATIENT)
Dept: CARDIOLOGY | Facility: HOSPITAL | Age: 59
Discharge: HOME | End: 2019-05-03

## 2019-05-03 VITALS
SYSTOLIC BLOOD PRESSURE: 132 MMHG | WEIGHT: 175 LBS | DIASTOLIC BLOOD PRESSURE: 88 MMHG | HEART RATE: 81 BPM | BODY MASS INDEX: 29.16 KG/M2 | OXYGEN SATURATION: 94 % | HEIGHT: 65 IN

## 2019-05-03 VITALS — BODY MASS INDEX: 29.16 KG/M2 | WEIGHT: 175.04 LBS | HEIGHT: 65 IN

## 2019-05-03 DIAGNOSIS — D68.52 PROTHROMBIN GENE MUTATION (HCC): ICD-10-CM

## 2019-05-03 DIAGNOSIS — R06.02 SHORTNESS OF BREATH: ICD-10-CM

## 2019-05-03 DIAGNOSIS — E83.110 HEREDITARY HEMOCHROMATOSIS (HCC): Primary | ICD-10-CM

## 2019-05-03 DIAGNOSIS — R07.9 CHEST PAIN, UNSPECIFIED TYPE: ICD-10-CM

## 2019-05-03 LAB
ALBUMIN SERPL-MCNC: 4.4 G/DL (ref 3.5–5.2)
ALBUMIN/GLOB SERPL: 1.2 G/DL
ALP SERPL-CCNC: 142 U/L (ref 39–117)
ALT SERPL W P-5'-P-CCNC: 77 U/L (ref 1–33)
ANION GAP SERPL CALCULATED.3IONS-SCNC: 11.7 MMOL/L
AST SERPL-CCNC: 56 U/L (ref 1–32)
BASOPHILS # BLD AUTO: 0.04 10*3/MM3 (ref 0–0.2)
BASOPHILS NFR BLD AUTO: 0.5 % (ref 0–1.5)
BH CV NUCLEAR PRIOR STUDY: 3
BH CV STRESS BP STAGE 1: NORMAL
BH CV STRESS COMMENTS STAGE 1: NORMAL
BH CV STRESS DOSE REGADENOSON STAGE 1: 0.4
BH CV STRESS DURATION MIN STAGE 1: 0
BH CV STRESS DURATION SEC STAGE 1: 10
BH CV STRESS HR STAGE 1: 109
BH CV STRESS PROTOCOL 1: NORMAL
BH CV STRESS RECOVERY BP: NORMAL MMHG
BH CV STRESS RECOVERY HR: 92 BPM
BH CV STRESS STAGE 1: 1
BILIRUB SERPL-MCNC: 0.4 MG/DL (ref 0.2–1.2)
BUN BLD-MCNC: 14 MG/DL (ref 6–20)
BUN/CREAT SERPL: 17.7 (ref 7–25)
CALCIUM SPEC-SCNC: 9.2 MG/DL (ref 8.6–10.5)
CHLORIDE SERPL-SCNC: 104 MMOL/L (ref 98–107)
CO2 SERPL-SCNC: 23.3 MMOL/L (ref 22–29)
CREAT BLD-MCNC: 0.79 MG/DL (ref 0.57–1)
D DIMER PPP FEU-MCNC: 0.29 MCGFEU/ML (ref 0–0.49)
DEPRECATED RDW RBC AUTO: 45.4 FL (ref 37–54)
EOSINOPHIL # BLD AUTO: 0.21 10*3/MM3 (ref 0–0.4)
EOSINOPHIL NFR BLD AUTO: 2.9 % (ref 0.3–6.2)
ERYTHROCYTE [DISTWIDTH] IN BLOOD BY AUTOMATED COUNT: 15.1 % (ref 12.3–15.4)
GFR SERPL CREATININE-BSD FRML MDRD: 75 ML/MIN/1.73
GLOBULIN UR ELPH-MCNC: 3.6 GM/DL
GLUCOSE BLD-MCNC: 130 MG/DL (ref 65–99)
HCT VFR BLD AUTO: 45.6 % (ref 34–46.6)
HGB BLD-MCNC: 14 G/DL (ref 12–15.9)
IMM GRANULOCYTES # BLD AUTO: 0.03 10*3/MM3 (ref 0–0.05)
IMM GRANULOCYTES NFR BLD AUTO: 0.4 % (ref 0–0.5)
LV EF NUC BP: 67 %
LYMPHOCYTES # BLD AUTO: 2.66 10*3/MM3 (ref 0.7–3.1)
LYMPHOCYTES NFR BLD AUTO: 36.4 % (ref 19.6–45.3)
MAXIMAL PREDICTED HEART RATE: 164 BPM
MCH RBC QN AUTO: 25.8 PG (ref 26.6–33)
MCHC RBC AUTO-ENTMCNC: 30.7 G/DL (ref 31.5–35.7)
MCV RBC AUTO: 84.1 FL (ref 79–97)
MONOCYTES # BLD AUTO: 0.54 10*3/MM3 (ref 0.1–0.9)
MONOCYTES NFR BLD AUTO: 7.4 % (ref 5–12)
NEUTROPHILS # BLD AUTO: 3.83 10*3/MM3 (ref 1.7–7)
NEUTROPHILS NFR BLD AUTO: 52.4 % (ref 42.7–76)
NRBC BLD AUTO-RTO: 0 /100 WBC (ref 0–0.2)
NT-PROBNP SERPL-MCNC: 28.7 PG/ML (ref 5–900)
PERCENT MAX PREDICTED HR: 66.46 %
PLATELET # BLD AUTO: 230 10*3/MM3 (ref 140–450)
PMV BLD AUTO: 9.2 FL (ref 6–12)
POTASSIUM BLD-SCNC: 4.1 MMOL/L (ref 3.5–5.2)
PROT SERPL-MCNC: 8 G/DL (ref 6–8.5)
RBC # BLD AUTO: 5.42 10*6/MM3 (ref 3.77–5.28)
SODIUM BLD-SCNC: 139 MMOL/L (ref 136–145)
STRESS BASELINE BP: NORMAL MMHG
STRESS BASELINE HR: 75 BPM
STRESS PERCENT HR: 78 %
STRESS POST EXERCISE DUR SEC: 10 SEC
STRESS POST PEAK BP: NORMAL MMHG
STRESS POST PEAK HR: 109 BPM
STRESS TARGET HR: 139 BPM
TROPONIN T SERPL-MCNC: <0.01 NG/ML (ref 0–0.03)
WBC NRBC COR # BLD: 7.31 10*3/MM3 (ref 3.4–10.8)

## 2019-05-03 PROCEDURE — 85025 COMPLETE CBC W/AUTO DIFF WBC: CPT | Performed by: INTERNAL MEDICINE

## 2019-05-03 PROCEDURE — 25010000002 REGADENOSON 0.4 MG/5ML SOLUTION: Performed by: INTERNAL MEDICINE

## 2019-05-03 PROCEDURE — 99204 OFFICE O/P NEW MOD 45 MIN: CPT | Performed by: INTERNAL MEDICINE

## 2019-05-03 PROCEDURE — 84484 ASSAY OF TROPONIN QUANT: CPT | Performed by: INTERNAL MEDICINE

## 2019-05-03 PROCEDURE — 36415 COLL VENOUS BLD VENIPUNCTURE: CPT

## 2019-05-03 PROCEDURE — 80053 COMPREHEN METABOLIC PANEL: CPT | Performed by: INTERNAL MEDICINE

## 2019-05-03 PROCEDURE — 78452 HT MUSCLE IMAGE SPECT MULT: CPT | Performed by: INTERNAL MEDICINE

## 2019-05-03 PROCEDURE — 94760 N-INVAS EAR/PLS OXIMETRY 1: CPT

## 2019-05-03 PROCEDURE — 83880 ASSAY OF NATRIURETIC PEPTIDE: CPT | Performed by: INTERNAL MEDICINE

## 2019-05-03 PROCEDURE — A9502 TC99M TETROFOSMIN: HCPCS | Performed by: INTERNAL MEDICINE

## 2019-05-03 PROCEDURE — 93017 CV STRESS TEST TRACING ONLY: CPT

## 2019-05-03 PROCEDURE — 85379 FIBRIN DEGRADATION QUANT: CPT | Performed by: INTERNAL MEDICINE

## 2019-05-03 PROCEDURE — 0 TECHNETIUM TETROFOSMIN KIT: Performed by: INTERNAL MEDICINE

## 2019-05-03 PROCEDURE — 93018 CV STRESS TEST I&R ONLY: CPT | Performed by: INTERNAL MEDICINE

## 2019-05-03 PROCEDURE — 78452 HT MUSCLE IMAGE SPECT MULT: CPT

## 2019-05-03 PROCEDURE — 93016 CV STRESS TEST SUPVJ ONLY: CPT | Performed by: INTERNAL MEDICINE

## 2019-05-03 RX ORDER — SODIUM CHLORIDE 0.9 % (FLUSH) 0.9 %
10 SYRINGE (ML) INJECTION AS NEEDED
Status: SHIPPED | OUTPATIENT
Start: 2019-05-03

## 2019-05-03 RX ORDER — NITROGLYCERIN 0.4 MG/1
0.4 TABLET SUBLINGUAL
Status: DISCONTINUED | OUTPATIENT
Start: 2019-05-03 | End: 2022-11-26

## 2019-05-03 RX ADMIN — TETROFOSMIN 1 DOSE: 1.38 INJECTION, POWDER, LYOPHILIZED, FOR SOLUTION INTRAVENOUS at 13:55

## 2019-05-03 RX ADMIN — REGADENOSON 0.4 MG: 0.08 INJECTION, SOLUTION INTRAVENOUS at 14:45

## 2019-05-03 RX ADMIN — TETROFOSMIN 1 DOSE: 1.38 INJECTION, POWDER, LYOPHILIZED, FOR SOLUTION INTRAVENOUS at 14:45

## 2019-05-03 NOTE — PROGRESS NOTES
Date of Office Visit: 2019  Encounter Provider: Shereen Flood MD  Place of Service: [facility] CARDIOLOGY  Patient Name: Kaitlin Alvarez  :1960    Chief complaint  Consult requested by Dr. Colon for evaluation of chest discomfort.    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  She was diagnosed with hemochromatosis approximately 6 months ago and has had her third phlebotomy and is followed by Dr. Greenberg.  She has no prior cardiac history and has been fairly sedentary though more recently has started to walk 20 minutes 3 times a day.  She states that 2 days ago while walking she developed 5 out of 10 chest pressure with questionable radiation to her arm while she was walking that resolved within 10 minutes of resting.  She had similar other episodes once or twice.  She has chronic arm pain and cannot tell if this discomfort in her arm is from chronic pain or associated with her chest.  She denies any abdominal symptoms melena bright red blood per rectum.  She was short of breath with this but denies any nausea or diaphoresis.  She was subsequent referred here for further evaluation.  EKG performed at Dr. Colon office shows sinus rhythm with nonspecific ST-T wave changes in the inferior leads no prior EKG for comparison.  She is currently pain-free.    Past Medical History:   Diagnosis Date   • Allergic rhinitis    • Anemia    • Depression    • Diabetes mellitus (CMS/HCC)    • Disease of thyroid gland    • GERD (gastroesophageal reflux disease)    • Hyperlipidemia    • IBS (irritable bowel syndrome)    • Iron overload    • Malignant neoplasm of cervix (CMS/HCC)    • Multiple sclerosis (CMS/HCC)    • RLS (restless legs  syndrome)    • Seizures (CMS/HCC)    • Sensory neuropathy    • Vitamin D deficiency      Past Surgical History:   Procedure Laterality Date   •  SECTION      x2   • CYSTOSCOPY URETERAL BALLOON DILATATION      x4   • ELBOW PROCEDURE     • STOMACH SURGERY       Allergies as of 2019 - Reviewed 2019   Allergen Reaction Noted   • Sulfa antibiotics  2016     Social History     Socioeconomic History   • Marital status: Single     Spouse name: Not on file   • Number of children: 2   • Years of education: High School   • Highest education level: Not on file   Occupational History     Employer: DIS[employer]LED   Tobacco Use   • Smoking status: Never Smoker   • Smokeless tobacco: Never Used   Substance and Sexual Activity   • Alcohol use: No     Frequency: Never   • Drug use: No     Family History   Problem Relation Age of Onset   • Breast cancer Maternal Aunt    • Diabetes Other    • Heart disease Other    • Heart disease Mother         CABG x3   • Diabetes Mother         Type 2   • Hypertension Mother    • Stroke Mother    • Lung cancer Father    • Liver cancer Brother 60   • Diabetes Brother      Review of Systems   Constitution: Negative for fever, malaise/fatigue, weight gain and weight loss.   HENT: Positive for hearing loss. Negative for ear pain, nosebleeds and sore throat.    Eyes: Negative for double vision, pain, vision loss in left eye and vision loss in right eye.   Cardiovascular:        See history of present illness.   Respiratory: Positive for shortness of breath. Negative for cough, sleep disturbances due to breathing, snoring and wheezing.    Endocrine: Negative for cold intolerance, heat intolerance and polyuria.   Skin: Negative for itching, poor wound healing and rash.   Musculoskeletal: Positive for joint pain and myalgias. Negative for joint swelling.   Gastrointestinal: Negative for abdominal pain, diarrhea, hematochezia, nausea and vomiting.   Genitourinary: Negative for hematuria  "and hesitancy.   Neurological: Negative for numbness, paresthesias and seizures.   Psychiatric/Behavioral: Negative for depression. The patient is not nervous/anxious.         Objective:     Vitals:    05/03/19 1244 05/03/19 1245 05/03/19 1249   BP: 132/84 132/88    BP Location: Left arm Right arm    Patient Position: Sitting Sitting    Pulse: 81     SpO2: 94%     Weight:   79.4 kg (175 lb)   Height:   165.1 cm (65\")     Body mass index is 29.12 kg/m².    Physical Exam   Constitutional: She is oriented to person, place, and time. She appears well-developed and well-nourished.   HENT:   Head: Normocephalic.   Nose: Nose normal.   Mouth/Throat: Oropharynx is clear and moist.   Eyes: Conjunctivae and EOM are normal. Pupils are equal, round, and reactive to light. Right eye exhibits no discharge. No scleral icterus.   Neck: Normal range of motion. Neck supple. No JVD present. No thyromegaly present.   Cardiovascular: Normal rate, regular rhythm, normal heart sounds and intact distal pulses. Exam reveals no gallop and no friction rub.   No murmur heard.  Pulses:       Carotid pulses are 2+ on the right side, and 2+ on the left side.       Radial pulses are 2+ on the right side, and 2+ on the left side.        Femoral pulses are 2+ on the right side, and 2+ on the left side.       Popliteal pulses are 2+ on the right side, and 2+ on the left side.        Dorsalis pedis pulses are 2+ on the right side, and 2+ on the left side.        Posterior tibial pulses are 2+ on the right side, and 2+ on the left side.   Pulmonary/Chest: Effort normal and breath sounds normal. No respiratory distress. She has no wheezes. She has no rales.   Abdominal: Soft. Bowel sounds are normal. She exhibits no distension. There is no hepatosplenomegaly. There is no tenderness. There is no rebound.   Musculoskeletal: Normal range of motion. She exhibits no edema or tenderness.   Neurological: She is alert and oriented to person, place, and time. "   Skin: Skin is warm and dry. No rash noted. No erythema.   Psychiatric: She has a normal mood and affect. Her behavior is normal. Judgment and thought content normal.   Vitals reviewed.    Lab Review:   Procedures  Assessment:       Diagnosis Plan   1. Hereditary hemochromatosis (CMS/HCC)     2. Chest pain, unspecified type  sodium chloride 0.9 % flush 10 mL    nitroglycerin (NITROSTAT) SL tablet 0.4 mg    CBC & Differential    Comprehensive Metabolic Panel    Troponin T    D-Dimer    proBNP    Troponin T    Troponin T    D-Dimer    D-Dimer    proBNP    proBNP    CBC Auto Differential    Stress Test With Myocardial Perfusion One Day   3. Shortness of breath  sodium chloride 0.9 % flush 10 mL    nitroglycerin (NITROSTAT) SL tablet 0.4 mg    CBC & Differential    Comprehensive Metabolic Panel    Troponin T    D-Dimer    proBNP    Troponin T    Troponin T    D-Dimer    D-Dimer    proBNP    proBNP    CBC Auto Differential   4. Prothrombin gene mutation (CMS/HCC)       Plan:       1.  Chest pain.  Has anginal symptoms and multiple risk factors for coronary artery disease.  We discussed risks and benefits of stress test versus cardiac catheterization.  While I mean in favor cardiac catheterization, patient wishes to pursue stress test.  If this is negative we will proceed with further GI evaluation including gallbladder imaging given elevated liver function test.  2.  Elevated liver function tests, as above of note is that this has been elevated in the setting of hemochromatosis though it had improved with phlebotomies.  3.  Diabetes  4.  Dyslipidemia  5.  Hemochromatosis  6.  Hypercoagulable state, no personal history of thrombotic events.  7.  Multiple sclerosis  8.  Neuropathy  9.  Hypothyroidism         Your medication list      ASK your doctor about these medications      Instructions Last Dose Given Next Dose Due   amitriptyline 100 MG tablet  Commonly known as:  ELAVIL      Take  by mouth.       atorvastatin 40 MG  tablet  Commonly known as:  LIPITOR      Take 40 mg by mouth Daily.       Butalbital-APAP-Caffeine -40 MG per capsule      Take  by mouth.       esomeprazole 40 MG capsule  Commonly known as:  nexIUM      Take 40 mg by mouth Every Morning Before Breakfast.       gabapentin 300 MG capsule  Commonly known as:  NEURONTIN      Take  by mouth.       levothyroxine 100 MCG tablet  Commonly known as:  SYNTHROID, LEVOTHROID      Take  by mouth.       linagliptin 5 MG tablet tablet  Commonly known as:  TRADJENTA      Take 5 mg by mouth Daily.       metFORMIN 1000 MG tablet  Commonly known as:  GLUCOPHAGE      Take 1,000 mg by mouth 2 (Two) Times a Day With Meals.       montelukast 4 MG pack  Commonly known as:  SINGULAIR      Take  by mouth.       OMEGA 3 PO      Take  by mouth.       pramipexole 0.125 MG tablet  Commonly known as:  MIRAPEX      Take 0.125 mg by mouth every night.       raNITIdine 150 MG tablet  Commonly known as:  ZANTAC      Take 150 mg by mouth 2 (Two) Times a Day.       topiramate 50 MG tablet  Commonly known as:  TOPAMAX      Take 50 mg by mouth 2 (Two) Times a Day.       vitamin B-12 500 MCG tablet  Commonly known as:  CYANOCOBALAMIN      Take 500 mcg by mouth Daily.       Vitamin D3 2000 units tablet      Take  by mouth.              Patient is no longer taking -.  I corrected the med list to reflect this.  I did not stop these medications.    Dictated utilizing Dragon dictation

## 2019-05-06 NOTE — PROGRESS NOTES
[facility] declined to cover the ordered Fouzia scan and Dr. Flood requested to speak with a doctor.  Precert call [facility] and was advised by the nurse there that there is not a process above the nurse level to contact a doctor.  The doctor must call back for a peer to peer review. Dr. Flood was informed of this information.

## 2019-05-11 ENCOUNTER — TELEPHONE (OUTPATIENT)
Dept: CARDIOLOGY | Facility: CLINIC | Age: 59
End: 2019-05-11

## 2019-05-11 PROBLEM — R07.9 CHEST PAIN: Status: ACTIVE | Noted: 2019-05-11

## 2019-05-11 PROBLEM — R06.02 SHORTNESS OF BREATH: Status: ACTIVE | Noted: 2019-05-11

## 2019-05-28 ENCOUNTER — HOSPITAL ENCOUNTER (OUTPATIENT)
Dept: CARDIOLOGY | Facility: HOSPITAL | Age: 59
Discharge: HOME | End: 2019-05-28
Admitting: INTERNAL MEDICINE

## 2019-05-28 VITALS
RESPIRATION RATE: 17 BRPM | HEIGHT: 65 IN | WEIGHT: 165 LBS | HEART RATE: 76 BPM | OXYGEN SATURATION: 95 % | BODY MASS INDEX: 27.49 KG/M2 | SYSTOLIC BLOOD PRESSURE: 112 MMHG | DIASTOLIC BLOOD PRESSURE: 81 MMHG

## 2019-05-28 DIAGNOSIS — R07.9 CHEST PAIN, UNSPECIFIED TYPE: ICD-10-CM

## 2019-05-28 DIAGNOSIS — R06.02 SHORTNESS OF BREATH: ICD-10-CM

## 2019-05-28 LAB
ALBUMIN SERPL-MCNC: 4.1 G/DL (ref 3.5–5.2)
ALBUMIN/GLOB SERPL: 1.2 G/DL
ALP SERPL-CCNC: 129 U/L (ref 39–117)
ALT SERPL W P-5'-P-CCNC: 57 U/L (ref 1–33)
ANION GAP SERPL CALCULATED.3IONS-SCNC: 13.5 MMOL/L
AST SERPL-CCNC: 45 U/L (ref 1–32)
BASOPHILS # BLD AUTO: 0.04 10*3/MM3 (ref 0–0.2)
BASOPHILS NFR BLD AUTO: 0.5 % (ref 0–1.5)
BILIRUB SERPL-MCNC: 0.3 MG/DL (ref 0.2–1.2)
BUN BLD-MCNC: 13 MG/DL (ref 6–20)
BUN/CREAT SERPL: 14.8 (ref 7–25)
CALCIUM SPEC-SCNC: 9.1 MG/DL (ref 8.6–10.5)
CHLORIDE SERPL-SCNC: 107 MMOL/L (ref 98–107)
CO2 SERPL-SCNC: 18.5 MMOL/L (ref 22–29)
CREAT BLD-MCNC: 0.88 MG/DL (ref 0.57–1)
D DIMER PPP FEU-MCNC: 0.46 MCGFEU/ML (ref 0–0.49)
DEPRECATED RDW RBC AUTO: 51 FL (ref 37–54)
EOSINOPHIL # BLD AUTO: 0.14 10*3/MM3 (ref 0–0.4)
EOSINOPHIL NFR BLD AUTO: 1.8 % (ref 0.3–6.2)
ERYTHROCYTE [DISTWIDTH] IN BLOOD BY AUTOMATED COUNT: 16.2 % (ref 12.3–15.4)
GFR SERPL CREATININE-BSD FRML MDRD: 66 ML/MIN/1.73
GLOBULIN UR ELPH-MCNC: 3.3 GM/DL
GLUCOSE BLD-MCNC: 111 MG/DL (ref 65–99)
HCT VFR BLD AUTO: 42.8 % (ref 34–46.6)
HGB BLD-MCNC: 13 G/DL (ref 12–15.9)
IMM GRANULOCYTES # BLD AUTO: 0.02 10*3/MM3 (ref 0–0.05)
IMM GRANULOCYTES NFR BLD AUTO: 0.3 % (ref 0–0.5)
LYMPHOCYTES # BLD AUTO: 2.8 10*3/MM3 (ref 0.7–3.1)
LYMPHOCYTES NFR BLD AUTO: 35.8 % (ref 19.6–45.3)
MCH RBC QN AUTO: 26.3 PG (ref 26.6–33)
MCHC RBC AUTO-ENTMCNC: 30.4 G/DL (ref 31.5–35.7)
MCV RBC AUTO: 86.6 FL (ref 79–97)
MONOCYTES # BLD AUTO: 0.49 10*3/MM3 (ref 0.1–0.9)
MONOCYTES NFR BLD AUTO: 6.3 % (ref 5–12)
NEUTROPHILS # BLD AUTO: 4.34 10*3/MM3 (ref 1.7–7)
NEUTROPHILS NFR BLD AUTO: 55.3 % (ref 42.7–76)
NRBC BLD AUTO-RTO: 0.1 /100 WBC (ref 0–0.2)
NT-PROBNP SERPL-MCNC: 42.9 PG/ML (ref 5–900)
PLATELET # BLD AUTO: 218 10*3/MM3 (ref 140–450)
PMV BLD AUTO: 8.8 FL (ref 6–12)
POTASSIUM BLD-SCNC: 4.2 MMOL/L (ref 3.5–5.2)
PROT SERPL-MCNC: 7.4 G/DL (ref 6–8.5)
RBC # BLD AUTO: 4.94 10*6/MM3 (ref 3.77–5.28)
SODIUM BLD-SCNC: 139 MMOL/L (ref 136–145)
TROPONIN T SERPL-MCNC: <0.01 NG/ML (ref 0–0.03)
WBC NRBC COR # BLD: 7.83 10*3/MM3 (ref 3.4–10.8)

## 2019-05-28 PROCEDURE — 99214 OFFICE O/P EST MOD 30 MIN: CPT | Performed by: INTERNAL MEDICINE

## 2019-05-28 PROCEDURE — 85025 COMPLETE CBC W/AUTO DIFF WBC: CPT | Performed by: INTERNAL MEDICINE

## 2019-05-28 PROCEDURE — 94760 N-INVAS EAR/PLS OXIMETRY 1: CPT

## 2019-05-28 PROCEDURE — 80053 COMPREHEN METABOLIC PANEL: CPT | Performed by: INTERNAL MEDICINE

## 2019-05-28 PROCEDURE — 84484 ASSAY OF TROPONIN QUANT: CPT | Performed by: INTERNAL MEDICINE

## 2019-05-28 PROCEDURE — 83880 ASSAY OF NATRIURETIC PEPTIDE: CPT | Performed by: INTERNAL MEDICINE

## 2019-05-28 PROCEDURE — 36415 COLL VENOUS BLD VENIPUNCTURE: CPT

## 2019-05-28 PROCEDURE — 85379 FIBRIN DEGRADATION QUANT: CPT | Performed by: INTERNAL MEDICINE

## 2019-05-28 RX ORDER — SODIUM CHLORIDE 0.9 % (FLUSH) 0.9 %
10 SYRINGE (ML) INJECTION AS NEEDED
Status: SHIPPED | OUTPATIENT
Start: 2019-05-28

## 2019-05-28 RX ORDER — NITROGLYCERIN 0.4 MG/1
0.4 TABLET SUBLINGUAL
Status: SHIPPED | OUTPATIENT
Start: 2019-05-28

## 2019-05-28 NOTE — PROGRESS NOTES
Subjective:     Encounter Date:05/28/2019      Patient ID: Kaitlin Alvarez is a 58 y.o. female.    Chief Complaint: epigastric pain  History of Present Illness      Dear Dr. Colon,    I had the pleasure of seeing this patient in the Cardiac Evaluation Clinic today for evaluation of epigastric and lower substernal chest discomfort.    Patient was recently in to the [facility] and was seen by Dr. Flood.  She was originally scheduled to be seen in our office again tomorrow for follow-up.    Over the weekend, she was camping and right after he laid down she had severe epigastric discomfort.  It was sort of a sharp pain.  It seemed that she took a deeper breath it might make it hurt just a little bit worse.  There was no chest wall tenderness.  Nothing she could push on that would affected in any way.  She did not have any shortness of breath.  No diaphoresis.  She does not recall any nausea.  She says that if she sat up it seems like it helped just a little bit.  She denies any chills or fevers.  No chest wall trauma.  Says she finally laid down to sleep but she thinks it hurt for an hour or more before she finally was able to fall asleep.  When she got up the next day she felt a little fatigued but otherwise did not have the discomfort.  She came to your office this morning, had an EKG and then was sent here.    She was seen here by Dr. Flood.  She had a stress Cardiolite on May 3 that was normal.  No ischemia was seen and there was normal function.  Increased levels of AST, ALT, and alkaline phosphatase were noted and the patient was to follow-up with you for consideration of a right upper quadrant ultrasound but she is is that she has not had anything performed yet.  She does have a history of irritable bowel syndrome in the past.  She is also has a history of GERD.    The following portions of the patient's history were reviewed and updated as appropriate: allergies, current medications, past family history, past  medical history, past social history, past surgical history and problem list.    Past Medical History:   Diagnosis Date   • Allergic rhinitis    • Anemia    • Depression    • Diabetes mellitus (CMS/HCC)    • Disease of thyroid gland    • GERD (gastroesophageal reflux disease)    • Hyperlipidemia    • IBS (irritable bowel syndrome)    • Iron overload    • Malignant neoplasm of cervix (CMS/HCC)    • Multiple sclerosis (CMS/HCC)    • RLS (restless legs syndrome)    • Seizures (CMS/HCC)    • Sensory neuropathy    • Vitamin D deficiency        Past Surgical History:   Procedure Laterality Date   •  SECTION      x2   • CYSTOSCOPY URETERAL BALLOON DILATATION      x4   • ELBOW PROCEDURE     • STOMACH SURGERY         Social History     Socioeconomic History   • Marital status: Single     Spouse name: Not on file   • Number of children: 2   • Years of education: High School   • Highest education level: Not on file   Occupational History     Employer: [employer]   Tobacco Use   • Smoking status: Never Smoker   • Smokeless tobacco: Never Used   Substance and Sexual Activity   • Alcohol use: No     Frequency: Never   • Drug use: No       Review of Systems   Constitution: Negative for chills, decreased appetite, fever and night sweats.   HENT: Negative for ear discharge, ear pain, hearing loss, nosebleeds and sore throat.    Eyes: Negative for blurred vision, double vision and pain.   Cardiovascular: Negative for cyanosis.   Respiratory: Negative for hemoptysis and sputum production.    Endocrine: Negative for cold intolerance and heat intolerance.   Hematologic/Lymphatic: Negative for adenopathy.   Skin: Negative for dry skin, itching, nail changes, rash and suspicious lesions.   Musculoskeletal: Negative for arthritis, gout, muscle cramps, muscle weakness, myalgias and neck pain.   Gastrointestinal: Negative for anorexia, bowel incontinence, constipation, diarrhea, dysphagia, hematemesis and jaundice.   Genitourinary:  "Negative for bladder incontinence, dysuria, flank pain, frequency, hematuria and nocturia.   Neurological: Negative for focal weakness, numbness, paresthesias and seizures.   Psychiatric/Behavioral: Negative for altered mental status, hallucinations, hypervigilance, suicidal ideas and thoughts of violence.   Allergic/Immunologic: Negative for persistent infections.       Procedures  I reviewed the EKG from your office.  Normal sinus Rhythm, normal axis, normal EKG.     Objective:     Vitals:    05/28/19 1235   BP: 112/81   BP Location: Right arm   Patient Position: Sitting   Pulse: 76   Resp: 17   SpO2: 95%   Weight: 74.8 kg (165 lb)   Height: 165.1 cm (65\")         Physical Exam   Constitutional: She is oriented to person, place, and time. She appears well-developed and well-nourished. No distress.   HENT:   Head: Normocephalic and atraumatic.   Nose: Nose normal.   Mouth/Throat: Oropharynx is clear and moist.   Eyes: Conjunctivae and EOM are normal. Pupils are equal, round, and reactive to light. Right eye exhibits no discharge. Left eye exhibits no discharge.   Neck: Normal range of motion. Neck supple. No tracheal deviation present. No thyromegaly present.   Cardiovascular: Normal rate, regular rhythm, S1 normal, S2 normal, normal heart sounds and normal pulses. Exam reveals no S3.   Pulmonary/Chest: Effort normal and breath sounds normal. No stridor. No respiratory distress. She exhibits no tenderness.   Abdominal: Soft. Bowel sounds are normal. She exhibits no distension and no mass. There is no tenderness. There is no rebound and no guarding.   Musculoskeletal: Normal range of motion. She exhibits no tenderness or deformity.   Lymphadenopathy:     She has no cervical adenopathy.   Neurological: She is alert and oriented to person, place, and time. She has normal reflexes.   Skin: Skin is warm and dry. No rash noted. She is not diaphoretic. No erythema.   Psychiatric: She has a normal mood and affect. Thought " content normal.       Lab Review:   Results from last 7 days   Lab Units 05/28/19  1233   SODIUM mmol/L 139   POTASSIUM mmol/L 4.2   CHLORIDE mmol/L 107   CO2 mmol/L 18.5*   BUN mg/dL 13   CREATININE mg/dL 0.88   GLUCOSE mg/dL 111*   CALCIUM mg/dL 9.1           Performed  Stress Cardiolite from May 3 is reviewed.      Assessment:          Diagnosis Plan   1. Chest pain, unspecified type  Cardiac Monitoring    Vital Signs - Once    Vital Signs - As Needed    Pulse Oximetry    Oxygen Therapy- Nasal Cannula; Titrate for SPO2: 92%, equal to or greater than    Insert Peripheral IV    sodium chloride 0.9 % flush 10 mL    nitroglycerin (NITROSTAT) SL tablet 0.4 mg    NPO Diet    Bathroom Privileges With Assistance    CBC & Differential    Comprehensive Metabolic Panel    Troponin T    D-Dimer    proBNP    Cardiac Monitoring    Vital Signs - Once    Insert Peripheral IV    NPO Diet    Bathroom Privileges With Assistance    Troponin T    Troponin T    D-Dimer    D-Dimer    proBNP    proBNP    CBC Auto Differential   2. Shortness of breath  Cardiac Monitoring    Vital Signs - Once    Vital Signs - As Needed    Pulse Oximetry    Oxygen Therapy- Nasal Cannula; Titrate for SPO2: 92%, equal to or greater than    Insert Peripheral IV    sodium chloride 0.9 % flush 10 mL    nitroglycerin (NITROSTAT) SL tablet 0.4 mg    NPO Diet    Bathroom Privileges With Assistance    CBC & Differential    Comprehensive Metabolic Panel    Troponin T    D-Dimer    proBNP    Cardiac Monitoring    Vital Signs - Once    Insert Peripheral IV    NPO Diet    Bathroom Privileges With Assistance    Troponin T    Troponin T    D-Dimer    D-Dimer    proBNP    proBNP    CBC Auto Differential          Plan:       Epigastric and lower substernal chest discomfort- sounds very atypical for cardiac etiology.  Troponin today is normal.  EKG today is normal.  We again see the signal of increased AST, increased ALT, and increased alkaline phosphatase.  No significant  change from earlier in the month.  I agree with the prior recommendation to consider further evaluation of this, either with a right upper quadrant ultrasound or consideration of a GI consult.  Thank you very much for allowing us to participate in the care of this pleasant patient.  Please don't hesitate to call if I can be of assistance in any way.      Current Outpatient Medications:   •  amitriptyline (ELAVIL) 100 MG tablet, Take  by mouth., Disp: , Rfl:   •  atorvastatin (LIPITOR) 40 MG tablet, Take 40 mg by mouth Daily., Disp: , Rfl:   •  Butalbital-APAP-Caffeine -40 MG per capsule, Take  by mouth., Disp: , Rfl:   •  Cholecalciferol (VITAMIN D3) 2000 units tablet, Take  by mouth., Disp: , Rfl:   •  esomeprazole (nexIUM) 40 MG capsule, Take 40 mg by mouth Every Morning Before Breakfast., Disp: , Rfl:   •  gabapentin (NEURONTIN) 300 MG capsule, Take  by mouth., Disp: , Rfl:   •  levothyroxine (SYNTHROID, LEVOTHROID) 100 MCG tablet, Take  by mouth., Disp: , Rfl:   •  linagliptin (TRADJENTA) 5 MG tablet tablet, Take 5 mg by mouth Daily., Disp: , Rfl:   •  metFORMIN (GLUCOPHAGE) 1000 MG tablet, Take 1,000 mg by mouth 2 (Two) Times a Day With Meals., Disp: , Rfl:   •  montelukast (SINGULAIR) 4 MG pack, Take  by mouth., Disp: , Rfl:   •  Omega-3 Fatty Acids (OMEGA 3 PO), Take  by mouth., Disp: , Rfl:   •  pramipexole (MIRAPEX) 0.125 MG tablet, Take 0.125 mg by mouth every night., Disp: , Rfl:   •  raNITIdine (ZANTAC) 150 MG tablet, Take 150 mg by mouth 2 (Two) Times a Day., Disp: , Rfl:   •  topiramate (TOPAMAX) 50 MG tablet, Take 50 mg by mouth 2 (Two) Times a Day., Disp: , Rfl:   •  vitamin B-12 (CYANOCOBALAMIN) 500 MCG tablet, Take 500 mcg by mouth Daily., Disp: , Rfl:     Current Facility-Administered Medications:   •  nitroglycerin (NITROSTAT) SL tablet 0.4 mg, 0.4 mg, Sublingual, Q5 Min PRN, Shereen Flood MD  •  nitroglycerin (NITROSTAT) SL tablet 0.4 mg, 0.4 mg, Sublingual, Q5 Min PRN, Griffin Wright III,  MD  •  sodium chloride 0.9 % flush 10 mL, 10 mL, Intravenous, PRN, Shereen Flood MD  •  sodium chloride 0.9 % flush 10 mL, 10 mL, Intravenous, PRN, Griffin Wright III, MD

## 2019-05-30 ENCOUNTER — TRANSCRIBE ORDERS (OUTPATIENT)
Dept: ADMINISTRATIVE | Facility: HOSPITAL | Age: 59
End: 2019-05-30

## 2019-05-30 DIAGNOSIS — K80.50 BILIARY COLIC: Primary | ICD-10-CM

## 2019-06-02 ENCOUNTER — HOSPITAL ENCOUNTER (OUTPATIENT)
Dept: ULTRASOUND IMAGING | Facility: HOSPITAL | Age: 59
Discharge: HOME | End: 2019-06-02
Admitting: FAMILY MEDICINE

## 2019-06-02 DIAGNOSIS — K80.50 BILIARY COLIC: ICD-10-CM

## 2019-06-02 PROCEDURE — 76705 ECHO EXAM OF ABDOMEN: CPT

## 2019-06-04 ENCOUNTER — TRANSCRIBE ORDERS (OUTPATIENT)
Dept: ADMINISTRATIVE | Facility: HOSPITAL | Age: 59
End: 2019-06-04

## 2019-06-04 DIAGNOSIS — R10.11 RUQ PAIN: Primary | ICD-10-CM

## 2019-06-06 ENCOUNTER — LAB (OUTPATIENT)
Dept: OTHER | Facility: HOSPITAL | Age: 59
End: 2019-06-06

## 2019-06-06 ENCOUNTER — INFUSION (OUTPATIENT)
Dept: ONCOLOGY | Facility: HOSPITAL | Age: 59
End: 2019-06-06

## 2019-06-06 VITALS
DIASTOLIC BLOOD PRESSURE: 72 MMHG | WEIGHT: 178.4 LBS | OXYGEN SATURATION: 98 % | HEART RATE: 81 BPM | SYSTOLIC BLOOD PRESSURE: 107 MMHG | TEMPERATURE: 97.9 F | BODY MASS INDEX: 29.69 KG/M2

## 2019-06-06 DIAGNOSIS — E83.110 HEREDITARY HEMOCHROMATOSIS (HCC): ICD-10-CM

## 2019-06-06 LAB
BASOPHILS # BLD AUTO: 0.05 10*3/MM3 (ref 0–0.2)
BASOPHILS NFR BLD AUTO: 0.7 % (ref 0–1.5)
DEPRECATED RDW RBC AUTO: 49.9 FL (ref 37–54)
EOSINOPHIL # BLD AUTO: 0.19 10*3/MM3 (ref 0–0.4)
EOSINOPHIL NFR BLD AUTO: 2.5 % (ref 0.3–6.2)
ERYTHROCYTE [DISTWIDTH] IN BLOOD BY AUTOMATED COUNT: 15.9 % (ref 12.3–15.4)
FERRITIN SERPL-MCNC: 35.4 NG/ML (ref 13–150)
HCT VFR BLD AUTO: 42.4 % (ref 34–46.6)
HGB BLD-MCNC: 13.3 G/DL (ref 12–15.9)
IMM GRANULOCYTES # BLD AUTO: 0.03 10*3/MM3 (ref 0–0.05)
IMM GRANULOCYTES NFR BLD AUTO: 0.4 % (ref 0–0.5)
IRON 24H UR-MRATE: 67 MCG/DL (ref 37–145)
IRON SATN MFR SERPL: 16 % (ref 20–50)
LYMPHOCYTES # BLD AUTO: 2.85 10*3/MM3 (ref 0.7–3.1)
LYMPHOCYTES NFR BLD AUTO: 37.1 % (ref 19.6–45.3)
MCH RBC QN AUTO: 27 PG (ref 26.6–33)
MCHC RBC AUTO-ENTMCNC: 31.4 G/DL (ref 31.5–35.7)
MCV RBC AUTO: 86.2 FL (ref 79–97)
MONOCYTES # BLD AUTO: 0.58 10*3/MM3 (ref 0.1–0.9)
MONOCYTES NFR BLD AUTO: 7.5 % (ref 5–12)
NEUTROPHILS # BLD AUTO: 3.99 10*3/MM3 (ref 1.7–7)
NEUTROPHILS NFR BLD AUTO: 51.8 % (ref 42.7–76)
NRBC BLD AUTO-RTO: 0 /100 WBC (ref 0–0.2)
PLATELET # BLD AUTO: 218 10*3/MM3 (ref 140–450)
PMV BLD AUTO: 8.9 FL (ref 6–12)
RBC # BLD AUTO: 4.92 10*6/MM3 (ref 3.77–5.28)
TIBC SERPL-MCNC: 411 MCG/DL (ref 298–536)
TRANSFERRIN SERPL-MCNC: 276 MG/DL (ref 200–360)
WBC NRBC COR # BLD: 7.69 10*3/MM3 (ref 3.4–10.8)

## 2019-06-06 PROCEDURE — 82728 ASSAY OF FERRITIN: CPT | Performed by: INTERNAL MEDICINE

## 2019-06-06 PROCEDURE — 36415 COLL VENOUS BLD VENIPUNCTURE: CPT

## 2019-06-06 PROCEDURE — 83540 ASSAY OF IRON: CPT | Performed by: INTERNAL MEDICINE

## 2019-06-06 PROCEDURE — 84466 ASSAY OF TRANSFERRIN: CPT | Performed by: INTERNAL MEDICINE

## 2019-06-06 PROCEDURE — 85025 COMPLETE CBC W/AUTO DIFF WBC: CPT | Performed by: INTERNAL MEDICINE

## 2019-06-06 NOTE — PROGRESS NOTES
Per tx plan no phlebotomy indicated. Pt sent to scheduling desk to set up next appt    Lab Results   Component Value Date    FERRITIN 35.40 06/06/2019

## 2019-06-07 ENCOUNTER — HOSPITAL ENCOUNTER (OUTPATIENT)
Dept: NUCLEAR MEDICINE | Facility: HOSPITAL | Age: 59
Discharge: HOME | End: 2019-06-07

## 2019-06-07 DIAGNOSIS — R10.11 RUQ PAIN: ICD-10-CM

## 2019-06-07 PROCEDURE — 78227 HEPATOBIL SYST IMAGE W/DRUG: CPT

## 2019-06-07 PROCEDURE — 25010000002 SINCALIDE PER 5 MCG: Performed by: FAMILY MEDICINE

## 2019-06-07 PROCEDURE — 0 TECHNETIUM TC 99M MEBROFENIN KIT: Performed by: FAMILY MEDICINE

## 2019-06-07 PROCEDURE — A9537 TC99M MEBROFENIN: HCPCS | Performed by: FAMILY MEDICINE

## 2019-06-07 RX ORDER — KIT FOR THE PREPARATION OF TECHNETIUM TC 99M MEBROFENIN 45 MG/10ML
1 INJECTION, POWDER, LYOPHILIZED, FOR SOLUTION INTRAVENOUS
Status: COMPLETED | OUTPATIENT
Start: 2019-06-07 | End: 2019-06-07

## 2019-06-07 RX ADMIN — SINCALIDE 1.6 MCG: 5 INJECTION, POWDER, LYOPHILIZED, FOR SOLUTION INTRAVENOUS at 11:49

## 2019-06-07 RX ADMIN — MEBROFENIN 1 DOSE: 45 INJECTION, POWDER, LYOPHILIZED, FOR SOLUTION INTRAVENOUS at 08:59

## 2019-06-11 ENCOUNTER — TRANSCRIBE ORDERS (OUTPATIENT)
Dept: ADMINISTRATIVE | Facility: HOSPITAL | Age: 59
End: 2019-06-11

## 2019-06-11 DIAGNOSIS — K30 FUNCTIONAL DYSPEPSIA: Primary | ICD-10-CM

## 2019-06-19 ENCOUNTER — HOSPITAL ENCOUNTER (OUTPATIENT)
Dept: NUCLEAR MEDICINE | Facility: HOSPITAL | Age: 59
Discharge: HOME | End: 2019-06-19

## 2019-06-19 DIAGNOSIS — K30 FUNCTIONAL DYSPEPSIA: ICD-10-CM

## 2019-06-19 PROCEDURE — 78264 GASTRIC EMPTYING IMG STUDY: CPT

## 2019-06-19 PROCEDURE — 0 TECHNETIUM SULFUR COLLOID: Performed by: FAMILY MEDICINE

## 2019-06-19 PROCEDURE — A9541 TC99M SULFUR COLLOID: HCPCS | Performed by: FAMILY MEDICINE

## 2019-06-19 RX ADMIN — TECHNETIUM TC 99M SULFUR COLLOID 1 DOSE: KIT at 07:31

## 2019-07-17 ENCOUNTER — OFFICE (OUTPATIENT)
Dept: URBAN - METROPOLITAN AREA CLINIC 75 | Facility: CLINIC | Age: 59
End: 2019-07-17

## 2019-07-17 VITALS
HEART RATE: 88 BPM | DIASTOLIC BLOOD PRESSURE: 88 MMHG | RESPIRATION RATE: 16 BRPM | WEIGHT: 174 LBS | HEIGHT: 65 IN | SYSTOLIC BLOOD PRESSURE: 128 MMHG

## 2019-07-17 DIAGNOSIS — R13.10 DYSPHAGIA, UNSPECIFIED: ICD-10-CM

## 2019-07-17 DIAGNOSIS — R68.81 EARLY SATIETY: ICD-10-CM

## 2019-07-17 DIAGNOSIS — R10.13 EPIGASTRIC PAIN: ICD-10-CM

## 2019-07-17 PROCEDURE — 99204 OFFICE O/P NEW MOD 45 MIN: CPT | Performed by: INTERNAL MEDICINE

## 2019-08-15 VITALS
DIASTOLIC BLOOD PRESSURE: 69 MMHG | HEIGHT: 65 IN | HEART RATE: 68 BPM | HEART RATE: 79 BPM | DIASTOLIC BLOOD PRESSURE: 52 MMHG | SYSTOLIC BLOOD PRESSURE: 135 MMHG | RESPIRATION RATE: 14 BRPM | RESPIRATION RATE: 12 BRPM | DIASTOLIC BLOOD PRESSURE: 57 MMHG | OXYGEN SATURATION: 97 % | TEMPERATURE: 97.3 F | OXYGEN SATURATION: 100 % | SYSTOLIC BLOOD PRESSURE: 140 MMHG | SYSTOLIC BLOOD PRESSURE: 87 MMHG | DIASTOLIC BLOOD PRESSURE: 47 MMHG | HEART RATE: 77 BPM | RESPIRATION RATE: 16 BRPM | HEART RATE: 74 BPM | OXYGEN SATURATION: 96 % | HEART RATE: 71 BPM | TEMPERATURE: 97.4 F | HEART RATE: 72 BPM | OXYGEN SATURATION: 99 % | DIASTOLIC BLOOD PRESSURE: 78 MMHG | SYSTOLIC BLOOD PRESSURE: 82 MMHG | RESPIRATION RATE: 30 BRPM | RESPIRATION RATE: 15 BRPM | HEART RATE: 75 BPM | SYSTOLIC BLOOD PRESSURE: 122 MMHG | WEIGHT: 160 LBS

## 2019-08-19 ENCOUNTER — AMBULATORY SURGICAL CENTER (OUTPATIENT)
Dept: URBAN - METROPOLITAN AREA SURGERY 17 | Facility: SURGERY | Age: 59
End: 2019-08-19
Payer: MEDICARE

## 2019-08-19 ENCOUNTER — OFFICE (OUTPATIENT)
Dept: URBAN - METROPOLITAN AREA PATHOLOGY 4 | Facility: PATHOLOGY | Age: 59
End: 2019-08-19
Payer: MEDICARE

## 2019-08-19 DIAGNOSIS — K31.84 GASTROPARESIS: ICD-10-CM

## 2019-08-19 DIAGNOSIS — R10.13 EPIGASTRIC PAIN: ICD-10-CM

## 2019-08-19 DIAGNOSIS — R68.81 EARLY SATIETY: ICD-10-CM

## 2019-08-19 DIAGNOSIS — K31.89 OTHER DISEASES OF STOMACH AND DUODENUM: ICD-10-CM

## 2019-08-19 DIAGNOSIS — R13.10 DYSPHAGIA, UNSPECIFIED: ICD-10-CM

## 2019-08-19 LAB
GI HISTOLOGY: A. UNSPECIFIED: (no result)
GI HISTOLOGY: PDF REPORT: (no result)

## 2019-08-19 PROCEDURE — 88305 TISSUE EXAM BY PATHOLOGIST: CPT | Performed by: INTERNAL MEDICINE

## 2019-08-19 PROCEDURE — 43239 EGD BIOPSY SINGLE/MULTIPLE: CPT | Performed by: INTERNAL MEDICINE

## 2019-10-09 ENCOUNTER — OFFICE (OUTPATIENT)
Dept: URBAN - METROPOLITAN AREA CLINIC 75 | Facility: CLINIC | Age: 59
End: 2019-10-09

## 2019-10-09 VITALS
RESPIRATION RATE: 10 BRPM | SYSTOLIC BLOOD PRESSURE: 104 MMHG | WEIGHT: 162 LBS | DIASTOLIC BLOOD PRESSURE: 80 MMHG | HEART RATE: 78 BPM | HEIGHT: 65 IN | OXYGEN SATURATION: 90 %

## 2019-10-09 DIAGNOSIS — E11.9 TYPE 2 DIABETES MELLITUS WITHOUT COMPLICATIONS: ICD-10-CM

## 2019-10-09 DIAGNOSIS — K31.84 GASTROPARESIS: ICD-10-CM

## 2019-10-09 PROCEDURE — 99213 OFFICE O/P EST LOW 20 MIN: CPT | Performed by: INTERNAL MEDICINE

## 2019-10-09 RX ORDER — METOCLOPRAMIDE 5 MG/1
20 TABLET ORAL
Qty: 120 | Refills: 3 | Status: ACTIVE
Start: 2019-10-09

## 2019-12-05 ENCOUNTER — APPOINTMENT (OUTPATIENT)
Dept: ONCOLOGY | Facility: HOSPITAL | Age: 59
End: 2019-12-05

## 2019-12-05 ENCOUNTER — APPOINTMENT (OUTPATIENT)
Dept: OTHER | Facility: HOSPITAL | Age: 59
End: 2019-12-05

## 2019-12-05 ENCOUNTER — APPOINTMENT (OUTPATIENT)
Dept: ONCOLOGY | Facility: CLINIC | Age: 59
End: 2019-12-05

## 2019-12-12 ENCOUNTER — OFFICE VISIT (OUTPATIENT)
Dept: ONCOLOGY | Facility: CLINIC | Age: 59
End: 2019-12-12

## 2019-12-12 ENCOUNTER — INFUSION (OUTPATIENT)
Dept: ONCOLOGY | Facility: HOSPITAL | Age: 59
End: 2019-12-12

## 2019-12-12 ENCOUNTER — LAB (OUTPATIENT)
Dept: OTHER | Facility: HOSPITAL | Age: 59
End: 2019-12-12

## 2019-12-12 VITALS
RESPIRATION RATE: 16 BRPM | HEIGHT: 65 IN | SYSTOLIC BLOOD PRESSURE: 102 MMHG | TEMPERATURE: 97.4 F | HEART RATE: 68 BPM | OXYGEN SATURATION: 98 % | WEIGHT: 161.5 LBS | DIASTOLIC BLOOD PRESSURE: 65 MMHG | BODY MASS INDEX: 26.91 KG/M2

## 2019-12-12 DIAGNOSIS — E83.110 HEREDITARY HEMOCHROMATOSIS (HCC): ICD-10-CM

## 2019-12-12 DIAGNOSIS — E83.110 HEREDITARY HEMOCHROMATOSIS (HCC): Primary | ICD-10-CM

## 2019-12-12 DIAGNOSIS — D68.52 PROTHROMBIN GENE MUTATION (HCC): ICD-10-CM

## 2019-12-12 LAB
ALBUMIN SERPL-MCNC: 4.3 G/DL (ref 3.5–5.2)
ALBUMIN/GLOB SERPL: 1.2 G/DL
ALP SERPL-CCNC: 108 U/L (ref 39–117)
ALT SERPL W P-5'-P-CCNC: 25 U/L (ref 1–33)
ANION GAP SERPL CALCULATED.3IONS-SCNC: 10.4 MMOL/L (ref 5–15)
AST SERPL-CCNC: 18 U/L (ref 1–32)
BASOPHILS # BLD AUTO: 0.03 10*3/MM3 (ref 0–0.2)
BASOPHILS NFR BLD AUTO: 0.5 % (ref 0–1.5)
BILIRUB SERPL-MCNC: 0.4 MG/DL (ref 0.1–1.2)
BUN BLD-MCNC: 17 MG/DL (ref 6–20)
BUN/CREAT SERPL: 18.7 (ref 7–25)
CALCIUM SPEC-SCNC: 9.8 MG/DL (ref 8.6–10.5)
CHLORIDE SERPL-SCNC: 105 MMOL/L (ref 98–107)
CO2 SERPL-SCNC: 26.6 MMOL/L (ref 22–29)
CREAT BLD-MCNC: 0.91 MG/DL (ref 0.57–1)
DEPRECATED RDW RBC AUTO: 42.8 FL (ref 37–54)
EOSINOPHIL # BLD AUTO: 0.22 10*3/MM3 (ref 0–0.4)
EOSINOPHIL NFR BLD AUTO: 3.5 % (ref 0.3–6.2)
ERYTHROCYTE [DISTWIDTH] IN BLOOD BY AUTOMATED COUNT: 13.2 % (ref 12.3–15.4)
FERRITIN SERPL-MCNC: 39.4 NG/ML (ref 13–150)
GFR SERPL CREATININE-BSD FRML MDRD: 64 ML/MIN/1.73
GLOBULIN UR ELPH-MCNC: 3.5 GM/DL
GLUCOSE BLD-MCNC: 106 MG/DL (ref 65–99)
HCT VFR BLD AUTO: 44.1 % (ref 34–46.6)
HGB BLD-MCNC: 13.8 G/DL (ref 12–15.9)
IMM GRANULOCYTES # BLD AUTO: 0.01 10*3/MM3 (ref 0–0.05)
IMM GRANULOCYTES NFR BLD AUTO: 0.2 % (ref 0–0.5)
IRON 24H UR-MRATE: 68 MCG/DL (ref 37–145)
IRON SATN MFR SERPL: 17 % (ref 20–50)
LYMPHOCYTES # BLD AUTO: 2.01 10*3/MM3 (ref 0.7–3.1)
LYMPHOCYTES NFR BLD AUTO: 32.4 % (ref 19.6–45.3)
MCH RBC QN AUTO: 27.5 PG (ref 26.6–33)
MCHC RBC AUTO-ENTMCNC: 31.3 G/DL (ref 31.5–35.7)
MCV RBC AUTO: 88 FL (ref 79–97)
MONOCYTES # BLD AUTO: 0.55 10*3/MM3 (ref 0.1–0.9)
MONOCYTES NFR BLD AUTO: 8.9 % (ref 5–12)
NEUTROPHILS # BLD AUTO: 3.38 10*3/MM3 (ref 1.7–7)
NEUTROPHILS NFR BLD AUTO: 54.5 % (ref 42.7–76)
NRBC BLD AUTO-RTO: 0 /100 WBC (ref 0–0.2)
PLATELET # BLD AUTO: 217 10*3/MM3 (ref 140–450)
PMV BLD AUTO: 8.6 FL (ref 6–12)
POTASSIUM BLD-SCNC: 4 MMOL/L (ref 3.5–5.2)
PROT SERPL-MCNC: 7.8 G/DL (ref 6–8.5)
RBC # BLD AUTO: 5.01 10*6/MM3 (ref 3.77–5.28)
SODIUM BLD-SCNC: 142 MMOL/L (ref 136–145)
TIBC SERPL-MCNC: 392 MCG/DL (ref 298–536)
TRANSFERRIN SERPL-MCNC: 263 MG/DL (ref 200–360)
WBC NRBC COR # BLD: 6.2 10*3/MM3 (ref 3.4–10.8)

## 2019-12-12 PROCEDURE — 82728 ASSAY OF FERRITIN: CPT | Performed by: INTERNAL MEDICINE

## 2019-12-12 PROCEDURE — 80053 COMPREHEN METABOLIC PANEL: CPT | Performed by: INTERNAL MEDICINE

## 2019-12-12 PROCEDURE — 84466 ASSAY OF TRANSFERRIN: CPT | Performed by: INTERNAL MEDICINE

## 2019-12-12 PROCEDURE — 36415 COLL VENOUS BLD VENIPUNCTURE: CPT

## 2019-12-12 PROCEDURE — 99213 OFFICE O/P EST LOW 20 MIN: CPT | Performed by: INTERNAL MEDICINE

## 2019-12-12 PROCEDURE — 83540 ASSAY OF IRON: CPT | Performed by: INTERNAL MEDICINE

## 2019-12-12 PROCEDURE — 85025 COMPLETE CBC W/AUTO DIFF WBC: CPT | Performed by: INTERNAL MEDICINE

## 2019-12-12 RX ORDER — METOCLOPRAMIDE 5 MG/1
5 TABLET ORAL
COMMUNITY

## 2019-12-12 NOTE — PROGRESS NOTES
.     REASON FOR CONSULTATION:     1. Hereditary hemochromatosis positive for homozygous HFE mutation H63D.  Patient had elevated liver function panel, she was started on phlebotomy on 11/28/2018.   2.  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  Positive family history.                                 HISTORY OF PRESENT ILLNESS:  The patient is a 59 y.o. year old female who is here for annual reevaluation to assess her response to phlebotomy.     Patient reports that she is at a baseline condition.  She has a last phlebotomy on 12/20/2018 when she had a Ferritin 64.3 ng/mL and iron saturation 19%, and a hemoglobin 13.3.  Her liver function panel was normal.     She had laboratory studies on 6/6/2019 reporting ferritin 35.04, free iron 67 TIBC 411 and iron saturation 16%.  Her hemoglobin was at baseline 13.3, normal WBC 7700 and platelets 218,000.  Her ferritin is within the target, no phlebotomy at that time.    Patient reports today that she does have more fatigue recently.  She told me that that she was put on Reglan due to gastroparesis likely caused by her diabetes.  She denies other changes of medical condition.    Laboratory study today reported ferritin 39.4, iron saturation 17%, and hemoglobin 13.8, hematocrit 44.1%, MCV 88.0, MCHC 31.3 platelets 217,000 and WBC 6200.  Her chemistry lab reported normal liver function panel, normal renal function normal electrolytes, marginal glucose 106.        Past Medical History:   Diagnosis Date   • Allergic rhinitis    • Anemia    • Depression    • Diabetes mellitus (CMS/HCC)    • Disease of thyroid gland    • GERD (gastroesophageal reflux disease)    • Hyperlipidemia    • IBS (irritable bowel syndrome)    • Iron overload    • Malignant neoplasm of cervix (CMS/HCC)    • Multiple sclerosis (CMS/HCC)    • RLS (restless legs syndrome)    • Seizures (CMS/HCC)    • Sensory neuropathy    • Vitamin D deficiency       Past Surgical History:   Procedure Laterality Date   •  SECTION      x2   • CYSTOSCOPY URETERAL BALLOON DILATATION      x4   • ELBOW PROCEDURE     • STOMACH SURGERY         HEMATOLOGIC/ONCOLOGIC HISTORY:  The patient is a 58 y.o. year old female who is here on 11/15/2008 for initial evaluation and treatment plan for newly diagnosed hereditary hemochromatosis.      This patient has a family history of hereditary hemachromatosis and her mother also had factor II/prothrombin gene mutation and history of DVT.  This patient had examination at her primary care physician, Dr. Colon’ office, on 2018. Laboratory study at that time reported abnormal liver function panel including AST 86, ALT 99, alkaline phosphatase 121 with normal total bilirubin 0.4. She had total serum protein 7.7 and albumin 4.4. Her electrolytes were normal. Patient had elevated glucose 231. Her CBC showed mild erythrocytosis with RBC 5.3 million/mcL, upper normal hematocrit of 46.6% and hemoglobin 15.3%. Normal platelets at 87,000. Normal WBC 8400 including neutrophils 4100 and lymphocytes 3300, monocytes 500. Her ferritin level was elevated at 621 ng/mL, iron saturation 35% with free iron 406 and TIBC 302.  Normal B12 level 945, folic acid 11.5.  Hemoglobin A1c 7.0 and vitamin D 25.  Further laboratory study around 2018 reported negative for hepatitis B surface antigen, hepatitis B surface antibody, HCV antibody, HIV, and normal GGT 51.      Further studies on 10/16/2018 reported positive for homozygous HFE mutation at H63D.      This patient reports she has a family history of hemochromatosis and also prothrombin gene mutation.  Her mother had DVT and was found having factor II/prothrombin gene mutation.  One of her brothers and 1 of her sisters have hereditary hemochromatosis and both of them are high patient for treatment with frequent phlebotomies.  The patient herself has never had thrombophilia previously.    The patient  reports she had 2 term pregnancies.  She had menopause about 15 years ago.  She denies bleeding of any kind.  She was diagnosed with diabetes about 1-1/2 years ago.      The patient reports poor vision and intermittent diarrhea.  She has chronic low back pain and joint pain.  She has significant headaches.  No fainting or syncope.  She denies fevers, sweating, chills.      I saw her initially on 11/15/2018.  Since that time she had a 2 phlebotomy and IV normal saline infusion, and she has good tolerance.  She reports no fainting no dizziness.  This patient was also tested positive for heterozygous factor II mutation on 11/15/2018.    Pretreatment iron study reported at ferritin 270 ng/mL, free iron 79 TIBC 386 saturation 20% on 11/28/2018.  Hb 15.0, ,000 and WBC 6500.  Liver panel ALT 50 AST 30 alkaline phosphatase was 118 and the bilirubin 0.3.    Laboratory study on 12/20/2019 reported much improved ferritin 64.3, free iron 71 TIBC 375 and iron saturation 19%.  Hb 13.3, normal WBC and platelets.  Normalized ALT 28, AST 21, alkaline phosphatase is 116 and total bilirubin 0.4.  Patient had a phlebotomy of 500 mL.    MEDICATIONS    Current Outpatient Medications:   •  amitriptyline (ELAVIL) 100 MG tablet, Take  by mouth., Disp: , Rfl:   •  atorvastatin (LIPITOR) 40 MG tablet, Take 40 mg by mouth Daily., Disp: , Rfl:   •  Butalbital-APAP-Caffeine -40 MG per capsule, Take  by mouth., Disp: , Rfl:   •  Cholecalciferol (VITAMIN D3) 2000 units tablet, Take  by mouth., Disp: , Rfl:   •  esomeprazole (nexIUM) 40 MG capsule, Take 40 mg by mouth Every Morning Before Breakfast., Disp: , Rfl:   •  gabapentin (NEURONTIN) 300 MG capsule, Take  by mouth., Disp: , Rfl:   •  levothyroxine (SYNTHROID, LEVOTHROID) 100 MCG tablet, Take  by mouth., Disp: , Rfl:   •  linagliptin (TRADJENTA) 5 MG tablet tablet, Take 5 mg by mouth Daily., Disp: , Rfl:   •  metFORMIN (GLUCOPHAGE) 1000 MG tablet, Take 1,000 mg by mouth 2 (Two)  Times a Day With Meals., Disp: , Rfl:   •  metoclopramide (REGLAN) 5 MG tablet, Take 5 mg by mouth 4 (Four) Times a Day Before Meals & at Bedtime., Disp: , Rfl:   •  montelukast (SINGULAIR) 4 MG pack, Take  by mouth., Disp: , Rfl:   •  Omega-3 Fatty Acids (OMEGA 3 PO), Take  by mouth., Disp: , Rfl:   •  pramipexole (MIRAPEX) 0.125 MG tablet, Take 0.125 mg by mouth every night., Disp: , Rfl:   •  raNITIdine (ZANTAC) 150 MG tablet, Take 150 mg by mouth 2 (Two) Times a Day., Disp: , Rfl:   •  topiramate (TOPAMAX) 50 MG tablet, Take 50 mg by mouth 2 (Two) Times a Day., Disp: , Rfl:   •  vitamin B-12 (CYANOCOBALAMIN) 500 MCG tablet, Take 500 mcg by mouth Daily., Disp: , Rfl:     Current Facility-Administered Medications:   •  nitroglycerin (NITROSTAT) SL tablet 0.4 mg, 0.4 mg, Sublingual, Q5 Min PRN, Shereen Flodo MD  •  nitroglycerin (NITROSTAT) SL tablet 0.4 mg, 0.4 mg, Sublingual, Q5 Min PRN, Griffin Wright III, MD  •  sodium chloride 0.9 % flush 10 mL, 10 mL, Intravenous, PRN, Shereen Flood MD  •  sodium chloride 0.9 % flush 10 mL, 10 mL, Intravenous, PRN, Griffin Wright III, MD    ALLERGIES:     Allergies   Allergen Reactions   • Sulfa Antibiotics Dizziness       SOCIAL HISTORY:       Social History     Socioeconomic History   • Marital status: Single     Spouse name: Not on file   • Number of children: 2    • Years of education: High school education    • Highest education level: High school    Social Needs   • Financial resource strain: Not on file   • Food insecurity - worry: Not on file   • Food insecurity - inability: Not on file   • Transportation needs - medical: Not on file   • Transportation needs - non-medical: Not on file   Occupational History     Employer: DISABLED, previous had factory job    Tobacco Use   • Smoking status: Never Smoker   Substance and Sexual Activity   • Alcohol use: No   • Drug use: No    • Sexual activity: Not on file         FAMILY HISTORY:  Family History   Problem Relation Age of  "Onset   • Breast cancer Maternal Aunt    • Diabetes Other    • Heart disease Other    • Heart disease Mother         CABG x3   • Diabetes Mother         Type 2   • Hypertension Mother    • Stroke Mother    • Lung cancer Father    • Liver cancer Brother 60   • Diabetes Brother      1 brother has hereditary hemochromatosis, liver cancer because HH post liver transplant, he is undergoing phlebotomy treatment.  A sister has hemochromatosis undergoing phlebotomy treatment.  Mother had a DVT, found to have prothrombin gene mutation.    REVIEW OF SYSTEMS:  Review of Systems   Constitutional: Positive for fatigue and unexpected weight change (Weight gain). Negative for appetite change and diaphoresis.   HENT: Negative for congestion, hearing loss, nosebleeds and sore throat.    Eyes: Positive for visual disturbance (Poor vision). Negative for photophobia.   Respiratory: Negative for cough, chest tightness and shortness of breath.    Cardiovascular: Negative for chest pain, palpitations and leg swelling.   Gastrointestinal: Negative for abdominal pain, blood in stool, constipation, diarrhea and nausea.   Endocrine: Negative for cold intolerance and polyuria.   Genitourinary: Negative for dysuria and hematuria.   Musculoskeletal: Positive for arthralgias and back pain.   Skin: Negative for color change and rash.   Allergic/Immunologic: Negative for food allergies and immunocompromised state.   Neurological: Positive for headaches. Negative for dizziness and weakness.   Hematological: Negative for adenopathy. Does not bruise/bleed easily.   Psychiatric/Behavioral: Negative for agitation and confusion.              Vitals:    12/12/19 0810   BP: 102/65   Pulse: 68   Resp: 16   Temp: 97.4 °F (36.3 °C)   SpO2: 98%   Weight: 73.3 kg (161 lb 8 oz)  Comment: pt. weigh with a ankle brace   Height: 165 cm (64.96\")  Comment: new ht.   PainSc:   8   PainLoc: Comment: all over     Current Status 12/12/2019   ECOG score 0          " PHYSICAL EXAM:    Physical Exam   Constitutional: She is oriented to person, place, and time. She appears well-developed and well-nourished. No distress.   HENT:   Head: Normocephalic and atraumatic.   Eyes: Conjunctivae and EOM are normal. Pupils are equal, round, and reactive to light.   Neck: No tracheal deviation present.   Cardiovascular: Normal rate, regular rhythm and normal heart sounds.   No murmur heard.  Pulmonary/Chest: Effort normal and breath sounds normal. She has no wheezes.   Abdominal: Soft. Bowel sounds are normal.   Lymphadenopathy:     She has no cervical adenopathy.   Neurological: She is alert and oriented to person, place, and time.   Skin: Skin is warm and dry.   Psychiatric: She has a normal mood and affect. Thought content normal.         RECENT LABS:    Lab Results   Component Value Date    WBC 6.20 12/12/2019    HGB 13.8 12/12/2019    HCT 44.1 12/12/2019    MCV 88.0 12/12/2019     12/12/2019     Lab Results   Component Value Date    NEUTROABS 3.38 12/12/2019     Lab Results   Component Value Date    IRON 67 06/06/2019    TIBC 411 06/06/2019    FERRITIN 35.40 06/06/2019     Iron saturation 16 % on 6/6/2019    Lab Results   Component Value Date    IRON 68 12/12/2019    TIBC 392 12/12/2019    FERRITIN 39.40 12/12/2019     Iron saturation 17% on 12/12/2019        Assessment/Plan      1.  Hereditary hemochromatosis.  Patient had a liver enzyme elevation and the time of diagnosis.  Her ferritin was elevated at up to 600 ng/mL, however was not as high as checked in our labs.  Nevertheless we started patient on phlebotomy and that she had a great response after 2 sessions of phlebotomy in November 2018.  She had third of phlebotomy in late December 2018 with a target ferritin of 50..  At that time laboratory study showed ferritin 64 and iron saturation 19%.     On 6/6/2019, iron studies showed ferritin 35 and iron saturation 16% had a normal hemoglobin 13.3.  No phlebotomy at that  time.    Today lab studies showed a ferritin 39.4, with iron saturation 17%.  Maintains normal hemoglobin.  She complains of fatigue.  Certainly there is no phlebotomy needed today.  Her iron study actually has evidence of mild iron deficiency now.  I wonder whether her fatigue is related to that.  We will need to pay more attention in the future, may consider LISA but target ferritin to 100 ng/mL to see if that will balance her fatigue and goal of treatment.      2.  Positive for heterozygous factor II (prothrombin gene) mutation.  She presently had no thrombophilia.  She had a positive family history, with her mother having the same gene mutation and history of DVT.     Previously I asked patient to be mobile to avoid blood a staggering in her leg to inform clots.  During travel such as on airplane or by automobile, she needs to walk around for a few minutes every 1-2 hours.  She voiced understanding.     PLAN:  1.  No phlebotomy today.  2.  Patient return in 12 months for reevaluation, will check CBC CMP, ferritin and iron profile.  Will reevaluate and then may consider to increased target of ferritin to 100 ng/mL.    TAQUERIA ZURITA M.D., Ph.D.    12/12/2019       CC:  Gage Colon MD     Dictated using Dragon Dictation.

## 2019-12-12 NOTE — NURSING NOTE
Lab Results   Component Value Date    FERRITIN 39.40 12/12/2019       No phlebotomy indicated per treatment plan

## 2020-07-06 VITALS — HEIGHT: 65 IN

## 2020-07-08 ENCOUNTER — TELEHEALTH PROVIDED OTHER THAN IN PATIENT'S HOME (OUTPATIENT)
Dept: URBAN - METROPOLITAN AREA TELEHEALTH 6 | Facility: TELEHEALTH | Age: 60
End: 2020-07-08
Payer: MEDICARE

## 2020-07-08 DIAGNOSIS — R13.10 DYSPHAGIA, UNSPECIFIED: ICD-10-CM

## 2020-07-08 DIAGNOSIS — Z86.010 PERSONAL HISTORY OF COLONIC POLYPS: ICD-10-CM

## 2020-07-08 PROCEDURE — G2012 BRIEF CHECK IN BY MD/QHP: HCPCS | Performed by: INTERNAL MEDICINE

## 2020-07-08 NOTE — SERVICEHPINOTES
7/8/2020  BREANN DONALD  59  1960   was contacted for a virtual visit. 2 identifiers were used to enter the patient. Ms. Donald is a 59F following up regarding dysphagia. She states that she has her esophagus stretched about every 3 years. She has had a stressed 4 times in the past. For the past several months she has began to have worsening trouble swallowing. Every time she eats she feels like food gets stuck. She also occasionally will feel like liquids get stuck.She otherwise is been having no new symptoms. No nausea, vomiting, no abdominal pain. No change in bowel habits. No diarrhea, constipation.She states that she is scheduled for screening colonoscopy coming up later this month.  Her last colonoscopy was many years ago, she does of a history of colon polyps.

## 2020-07-15 ENCOUNTER — OFFICE VISIT (OUTPATIENT)
Dept: SURGERY | Facility: CLINIC | Age: 60
End: 2020-07-15

## 2020-07-15 VITALS
RESPIRATION RATE: 18 BRPM | BODY MASS INDEX: 26.33 KG/M2 | SYSTOLIC BLOOD PRESSURE: 146 MMHG | OXYGEN SATURATION: 99 % | HEIGHT: 65 IN | DIASTOLIC BLOOD PRESSURE: 72 MMHG | TEMPERATURE: 98.4 F | HEART RATE: 80 BPM | WEIGHT: 158 LBS

## 2020-07-15 DIAGNOSIS — R13.10 DYSPHAGIA, UNSPECIFIED TYPE: Primary | ICD-10-CM

## 2020-07-15 DIAGNOSIS — Z80.0 FAMILY HISTORY OF COLON CANCER: ICD-10-CM

## 2020-07-15 PROCEDURE — 99203 OFFICE O/P NEW LOW 30 MIN: CPT | Performed by: SURGERY

## 2020-07-15 NOTE — PROGRESS NOTES
Chief complaint: Unable to swallow    Patient is a 59 y.o. female referred by Gage Colon MD for for evaluation of reflux and dysphagia.  Patient reports she has had a long history of reflux and strictures with dilations.  Patient thinks is been more than 5 years since her last EGD and dilation.  Patient is having extreme difficulties even with hamburger and swallowing.  Patient reports she has to force feed down with liquids.  Patient sometimes has to regurgitate.  Patient has been on Nexium for years with some relief of reflux.  Patient has a family history of colon cancer and her last colonoscopy was in .  Patient denies any melena, hematochezia or bright red blood per rectum.  Patient denies any family history of ulcerative colitis, Crohn's disease or familial polyposis.  Patient denies fever, chills, nausea, night sweats or unexplained weight loss.    Past Medical History:   Diagnosis Date   • Allergic rhinitis    • Anemia    • Depression    • Diabetes mellitus (CMS/HCC)    • Disease of thyroid gland    • GERD (gastroesophageal reflux disease)    • Hyperlipidemia    • IBS (irritable bowel syndrome)    • Iron overload    • Malignant neoplasm of cervix (CMS/HCC)    • Multiple sclerosis (CMS/HCC)    • RLS (restless legs syndrome)    • Seizures (CMS/HCC)    • Sensory neuropathy    • Vitamin D deficiency      Past Surgical History:   Procedure Laterality Date   •  SECTION      x2   • CYSTOSCOPY URETERAL BALLOON DILATATION      x4   • ELBOW PROCEDURE     • STOMACH SURGERY       Family History   Problem Relation Age of Onset   • Breast cancer Maternal Aunt    • Diabetes Other    • Heart disease Other    • Heart disease Mother         CABG x3   • Diabetes Mother         Type 2   • Hypertension Mother    • Stroke Mother    • Lung cancer Father    • Liver cancer Brother 60   • Diabetes Brother      Social History     Tobacco Use   • Smoking status: Never Smoker   • Smokeless tobacco: Never Used    Substance Use Topics   • Alcohol use: No     Frequency: Never   • Drug use: No     Allergies   Allergen Reactions   • Sulfa Antibiotics Dizziness       Current Outpatient Medications:   •  amitriptyline (ELAVIL) 100 MG tablet, Take  by mouth., Disp: , Rfl:   •  atorvastatin (LIPITOR) 40 MG tablet, Take 40 mg by mouth Daily., Disp: , Rfl:   •  Butalbital-APAP-Caffeine -40 MG per capsule, Take  by mouth., Disp: , Rfl:   •  Cholecalciferol (VITAMIN D3) 2000 units tablet, Take  by mouth., Disp: , Rfl:   •  esomeprazole (nexIUM) 40 MG capsule, Take 40 mg by mouth Every Morning Before Breakfast., Disp: , Rfl:   •  gabapentin (NEURONTIN) 300 MG capsule, Take  by mouth., Disp: , Rfl:   •  levothyroxine (SYNTHROID, LEVOTHROID) 100 MCG tablet, Take  by mouth., Disp: , Rfl:   •  linagliptin (TRADJENTA) 5 MG tablet tablet, Take 5 mg by mouth Daily., Disp: , Rfl:   •  metFORMIN (GLUCOPHAGE) 1000 MG tablet, Take 1,000 mg by mouth 2 (Two) Times a Day With Meals., Disp: , Rfl:   •  metoclopramide (REGLAN) 5 MG tablet, Take 5 mg by mouth 4 (Four) Times a Day Before Meals & at Bedtime., Disp: , Rfl:   •  montelukast (SINGULAIR) 4 MG pack, Take  by mouth., Disp: , Rfl:   •  Omega-3 Fatty Acids (OMEGA 3 PO), Take  by mouth., Disp: , Rfl:   •  pramipexole (MIRAPEX) 0.125 MG tablet, Take 0.125 mg by mouth every night., Disp: , Rfl:   •  raNITIdine (ZANTAC) 150 MG tablet, Take 150 mg by mouth 2 (Two) Times a Day., Disp: , Rfl:   •  topiramate (TOPAMAX) 50 MG tablet, Take 50 mg by mouth 2 (Two) Times a Day., Disp: , Rfl:   •  vitamin B-12 (CYANOCOBALAMIN) 500 MCG tablet, Take 500 mcg by mouth Daily., Disp: , Rfl:     Current Facility-Administered Medications:   •  nitroglycerin (NITROSTAT) SL tablet 0.4 mg, 0.4 mg, Sublingual, Q5 Min PRN, Flood, Mini K, MD  •  nitroglycerin (NITROSTAT) SL tablet 0.4 mg, 0.4 mg, Sublingual, Q5 Min PRN, Griffin Wright III, MD  •  sodium chloride 0.9 % flush 10 mL, 10 mL, Intravenous, PRN, Flood, Mini K,  "MD  •  sodium chloride 0.9 % flush 10 mL, 10 mL, Intravenous, PRN, Griffin Wright III, MD    Review of Systems   Gastrointestinal:        Heart burn, trouble swallowing   All other systems reviewed and are negative.         Vitals:    07/15/20 1003   BP: 146/72   Pulse: 80   Resp: 18   Temp: 98.4 °F (36.9 °C)   SpO2: 99%   Weight: 71.7 kg (158 lb)   Height: 165 cm (64.96\")       Physical Exam  General/physcological:   Alert and oriented x3, in no acute distress  HEENT: Normal cephalic, atraumatic, PERRLA, EOMI, sclera anicteric, moist mucous membranes, neck is supple, no JVD, no carotid bruits, no thyromegaly no adenopathy  Respiratory: CTA and percussion  CVA: RRR, normal S1-S2, no murmurs, no gallops or rubs  GI: Positive BS, soft, nondistended, nontender, no rebound, no guarding, no hernias, no organomegaly and no masses  Musculoskeletal: Moves all 4 ext, no clubbing, no cyanosis or edema  Neurovascular: Grossly intact  Debilities: none  Emotional behavior: appropriate     Patient does not use tobacco products currently.     Assessment:  Dysphagia  History of strictures  Reflux  Family history of colon cancer    Plan:  I have recommended that the patient be further evaluated with an EGD and a colonoscopy.  I have reviewed these 2 procedures in detail with the patient.  I have discussed the risks, benefits and alternatives.  I have discussed the risk of anesthesia, bleeding and perforation.  Patient understands these risks and wishes to proceed.  I have her scheduled at her earliest convenience.    Dagmar Ball MD  General, Minimally Invasive and Endoscopic Surgery  [facility]      [address] [facility] [address] [facility] [address]    [address]  [address] [address]    P: [phone]  F: [phone]    Cc:  Gage Colon MD                  "

## 2020-07-17 ENCOUNTER — LAB REQUISITION (OUTPATIENT)
Dept: LAB | Facility: HOSPITAL | Age: 60
End: 2020-07-17

## 2020-07-17 DIAGNOSIS — Z00.00 ENCOUNTER FOR GENERAL ADULT MEDICAL EXAMINATION WITHOUT ABNORMAL FINDINGS: ICD-10-CM

## 2020-07-17 PROCEDURE — U0004 COV-19 TEST NON-CDC HGH THRU: HCPCS | Performed by: SURGERY

## 2020-07-18 LAB
REF LAB TEST METHOD: NORMAL
SARS-COV-2 RNA RESP QL NAA+PROBE: NOT DETECTED

## 2020-07-20 ENCOUNTER — LAB REQUISITION (OUTPATIENT)
Dept: LAB | Facility: HOSPITAL | Age: 60
End: 2020-07-20

## 2020-07-20 ENCOUNTER — OUTSIDE FACILITY SERVICE (OUTPATIENT)
Dept: SURGERY | Facility: CLINIC | Age: 60
End: 2020-07-20

## 2020-07-20 DIAGNOSIS — Z12.11 ENCOUNTER FOR SCREENING FOR MALIGNANT NEOPLASM OF COLON: ICD-10-CM

## 2020-07-20 PROCEDURE — 45380 COLONOSCOPY AND BIOPSY: CPT | Performed by: SURGERY

## 2020-07-20 PROCEDURE — 88312 SPECIAL STAINS GROUP 1: CPT | Performed by: SURGERY

## 2020-07-20 PROCEDURE — 43239 EGD BIOPSY SINGLE/MULTIPLE: CPT | Performed by: SURGERY

## 2020-07-20 PROCEDURE — 43249 ESOPH EGD DILATION <30 MM: CPT | Performed by: SURGERY

## 2020-07-20 PROCEDURE — 88305 TISSUE EXAM BY PATHOLOGIST: CPT | Performed by: SURGERY

## 2020-07-22 LAB
CYTO UR: NORMAL
LAB AP CASE REPORT: NORMAL
LAB AP CLINICAL INFORMATION: NORMAL
PATH REPORT.ADDENDUM SPEC: NORMAL
PATH REPORT.FINAL DX SPEC: NORMAL
PATH REPORT.GROSS SPEC: NORMAL

## 2020-07-28 ENCOUNTER — TELEPHONE (OUTPATIENT)
Dept: SURGERY | Facility: CLINIC | Age: 60
End: 2020-07-28

## 2020-07-28 NOTE — TELEPHONE ENCOUNTER
I have called the patient with her EGD and colonoscopy pathology report.  I have advised her she had a tubular adenoma in the sigmoid colon and would need to repeat her colonoscopy in 3 years.

## 2020-12-10 ENCOUNTER — OFFICE VISIT (OUTPATIENT)
Dept: ONCOLOGY | Facility: CLINIC | Age: 60
End: 2020-12-10

## 2020-12-10 ENCOUNTER — LAB (OUTPATIENT)
Dept: OTHER | Facility: HOSPITAL | Age: 60
End: 2020-12-10

## 2020-12-10 VITALS
HEIGHT: 65 IN | TEMPERATURE: 97.3 F | SYSTOLIC BLOOD PRESSURE: 111 MMHG | BODY MASS INDEX: 26.08 KG/M2 | RESPIRATION RATE: 16 BRPM | HEART RATE: 66 BPM | DIASTOLIC BLOOD PRESSURE: 73 MMHG | WEIGHT: 156.5 LBS | OXYGEN SATURATION: 98 %

## 2020-12-10 DIAGNOSIS — E83.110 HEREDITARY HEMOCHROMATOSIS (HCC): ICD-10-CM

## 2020-12-10 DIAGNOSIS — E83.110 HEREDITARY HEMOCHROMATOSIS (HCC): Primary | ICD-10-CM

## 2020-12-10 DIAGNOSIS — D68.52 PROTHROMBIN GENE MUTATION (HCC): ICD-10-CM

## 2020-12-10 LAB
ALBUMIN SERPL-MCNC: 4.5 G/DL (ref 3.5–5.2)
ALBUMIN/GLOB SERPL: 1.4 G/DL
ALP SERPL-CCNC: 94 U/L (ref 39–117)
ALT SERPL W P-5'-P-CCNC: 24 U/L (ref 1–33)
ANION GAP SERPL CALCULATED.3IONS-SCNC: 4.5 MMOL/L (ref 5–15)
AST SERPL-CCNC: 19 U/L (ref 1–32)
BASOPHILS # BLD AUTO: 0.03 10*3/MM3 (ref 0–0.2)
BASOPHILS NFR BLD AUTO: 0.5 % (ref 0–1.5)
BILIRUB SERPL-MCNC: 0.3 MG/DL (ref 0–1.2)
BUN SERPL-MCNC: 16 MG/DL (ref 6–20)
BUN/CREAT SERPL: 19.5 (ref 7–25)
CALCIUM SPEC-SCNC: 10.3 MG/DL (ref 8.6–10.5)
CHLORIDE SERPL-SCNC: 106 MMOL/L (ref 98–107)
CO2 SERPL-SCNC: 29.5 MMOL/L (ref 22–29)
CREAT SERPL-MCNC: 0.82 MG/DL (ref 0.57–1)
DEPRECATED RDW RBC AUTO: 42.8 FL (ref 37–54)
EOSINOPHIL # BLD AUTO: 0.15 10*3/MM3 (ref 0–0.4)
EOSINOPHIL NFR BLD AUTO: 2.3 % (ref 0.3–6.2)
ERYTHROCYTE [DISTWIDTH] IN BLOOD BY AUTOMATED COUNT: 13.1 % (ref 12.3–15.4)
FERRITIN SERPL-MCNC: 26.3 NG/ML (ref 13–150)
GFR SERPL CREATININE-BSD FRML MDRD: 72 ML/MIN/1.73
GLOBULIN UR ELPH-MCNC: 3.2 GM/DL
GLUCOSE SERPL-MCNC: 93 MG/DL (ref 65–99)
HCT VFR BLD AUTO: 44.5 % (ref 34–46.6)
HGB BLD-MCNC: 14.1 G/DL (ref 12–15.9)
IMM GRANULOCYTES # BLD AUTO: 0.01 10*3/MM3 (ref 0–0.05)
IMM GRANULOCYTES NFR BLD AUTO: 0.2 % (ref 0–0.5)
IRON 24H UR-MRATE: 71 MCG/DL (ref 37–145)
IRON SATN MFR SERPL: 16 % (ref 20–50)
LYMPHOCYTES # BLD AUTO: 2.19 10*3/MM3 (ref 0.7–3.1)
LYMPHOCYTES NFR BLD AUTO: 33.1 % (ref 19.6–45.3)
MCH RBC QN AUTO: 28.3 PG (ref 26.6–33)
MCHC RBC AUTO-ENTMCNC: 31.7 G/DL (ref 31.5–35.7)
MCV RBC AUTO: 89.2 FL (ref 79–97)
MONOCYTES # BLD AUTO: 0.5 10*3/MM3 (ref 0.1–0.9)
MONOCYTES NFR BLD AUTO: 7.6 % (ref 5–12)
NEUTROPHILS NFR BLD AUTO: 3.74 10*3/MM3 (ref 1.7–7)
NEUTROPHILS NFR BLD AUTO: 56.3 % (ref 42.7–76)
NRBC BLD AUTO-RTO: 0 /100 WBC (ref 0–0.2)
PLATELET # BLD AUTO: 232 10*3/MM3 (ref 140–450)
PMV BLD AUTO: 9 FL (ref 6–12)
POTASSIUM SERPL-SCNC: 3.9 MMOL/L (ref 3.5–5.2)
PROT SERPL-MCNC: 7.7 G/DL (ref 6–8.5)
RBC # BLD AUTO: 4.99 10*6/MM3 (ref 3.77–5.28)
SODIUM SERPL-SCNC: 140 MMOL/L (ref 136–145)
TIBC SERPL-MCNC: 438 MCG/DL (ref 298–536)
TRANSFERRIN SERPL-MCNC: 294 MG/DL (ref 200–360)
WBC # BLD AUTO: 6.62 10*3/MM3 (ref 3.4–10.8)

## 2020-12-10 PROCEDURE — 36415 COLL VENOUS BLD VENIPUNCTURE: CPT

## 2020-12-10 PROCEDURE — 80053 COMPREHEN METABOLIC PANEL: CPT | Performed by: INTERNAL MEDICINE

## 2020-12-10 PROCEDURE — 84466 ASSAY OF TRANSFERRIN: CPT | Performed by: INTERNAL MEDICINE

## 2020-12-10 PROCEDURE — 85025 COMPLETE CBC W/AUTO DIFF WBC: CPT | Performed by: INTERNAL MEDICINE

## 2020-12-10 PROCEDURE — 99213 OFFICE O/P EST LOW 20 MIN: CPT | Performed by: INTERNAL MEDICINE

## 2020-12-10 PROCEDURE — 83540 ASSAY OF IRON: CPT | Performed by: INTERNAL MEDICINE

## 2020-12-10 PROCEDURE — 82728 ASSAY OF FERRITIN: CPT | Performed by: INTERNAL MEDICINE

## 2020-12-10 RX ORDER — FLUTICASONE PROPIONATE 50 MCG
SPRAY, SUSPENSION (ML) NASAL
COMMUNITY
Start: 2020-11-07

## 2020-12-10 RX ORDER — METFORMIN HYDROCHLORIDE 500 MG/1
TABLET, EXTENDED RELEASE ORAL
COMMUNITY
Start: 2020-11-08

## 2020-12-10 RX ORDER — CIPROFLOXACIN 250 MG/1
TABLET, FILM COATED ORAL
COMMUNITY
Start: 2020-11-11 | End: 2020-12-10

## 2020-12-10 RX ORDER — EPINEPHRINE 0.3 MG/.3ML
INJECTION SUBCUTANEOUS
COMMUNITY

## 2020-12-10 NOTE — PROGRESS NOTES
.     REASON FOR CONSULTATION:     1. Hereditary hemochromatosis positive for homozygous HFE mutation H63D.  Patient had elevated liver function panel, she was started on phlebotomy on 2018.   2.  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  Positive family history.                                 HISTORY OF PRESENT ILLNESS:  The patient is a 60 y.o. year old female with a history of diabetes, hyperlipidemia, esophageal stricture, and hereditary hemochromatosis who is here for annual reevaluation for hereditary hemochromatosis.     Patient reports in the summer had a surgical procedure for esophageal stricture.  She was at a point not even able to swallow water.  She lost 30 pounds during the process.  She is better now after the procedure, however has not back to baseline yet.    According to medical records, she had EGD examination with dilatation of the esophageal structure up to 20 cm and also had a colonoscopy with polypectomy on 2020 by Dr. Ball.    Patient reports that she is at a baseline condition.     Laboratory studies today on 12/10/2020 reported hemoglobin 14.1, MCV 89.2, platelets 232,000 WBC 6620.  Iron study showed ferritin 26.3 ng/mL, free iron 71, and TIBC 138 and iron saturation 16%.  Chemistry lab reported unremarkable CMP..    Past Medical History:   Diagnosis Date   • Allergic rhinitis    • Anemia    • Depression    • Diabetes mellitus (CMS/HCC)    • Disease of thyroid gland    • GERD (gastroesophageal reflux disease)    • Hyperlipidemia    • IBS (irritable bowel syndrome)    • Iron overload    • Malignant neoplasm of cervix (CMS/HCC)    • Multiple sclerosis (CMS/HCC)    • RLS (restless legs syndrome)    • Seizures (CMS/HCC)    • Sensory neuropathy    • Vitamin D deficiency      Past Surgical History:   Procedure Laterality Date   •  SECTION      x2   • CYSTOSCOPY URETERAL BALLOON DILATATION      x4   • ELBOW  PROCEDURE     • STOMACH SURGERY         HEMATOLOGIC/ONCOLOGIC HISTORY:  The patient is a 58 y.o. year old female who is here on 11/15/2008 for initial evaluation and treatment plan for newly diagnosed hereditary hemochromatosis.      This patient has a family history of hereditary hemachromatosis and her mother also had factor II/prothrombin gene mutation and history of DVT.  This patient had examination at her primary care physician, Dr. Colon’ office, on 09/04/2018. Laboratory study at that time reported abnormal liver function panel including AST 86, ALT 99, alkaline phosphatase 121 with normal total bilirubin 0.4. She had total serum protein 7.7 and albumin 4.4. Her electrolytes were normal. Patient had elevated glucose 231. Her CBC showed mild erythrocytosis with RBC 5.3 million/mcL, upper normal hematocrit of 46.6% and hemoglobin 15.3%. Normal platelets at 87,000. Normal WBC 8400 including neutrophils 4100 and lymphocytes 3300, monocytes 500. Her ferritin level was elevated at 621 ng/mL, iron saturation 35% with free iron 406 and TIBC 302.  Normal B12 level 945, folic acid 11.5.  Hemoglobin A1c 7.0 and vitamin D 25.  Further laboratory study around 09/07/2018 reported negative for hepatitis B surface antigen, hepatitis B surface antibody, HCV antibody, HIV, and normal GGT 51.      Further studies on 10/16/2018 reported positive for homozygous HFE mutation at H63D.      This patient reports she has a family history of hemochromatosis and also prothrombin gene mutation.  Her mother had DVT and was found having factor II/prothrombin gene mutation.  One of her brothers and 1 of her sisters have hereditary hemochromatosis and both of them are high patient for treatment with frequent phlebotomies.  The patient herself has never had thrombophilia previously.    The patient reports she had 2 term pregnancies.  She had menopause about 15 years ago.  She denies bleeding of any kind.  She was diagnosed with diabetes  about 1-1/2 years ago.      The patient reports poor vision and intermittent diarrhea.  She has chronic low back pain and joint pain.  She has significant headaches.  No fainting or syncope.  She denies fevers, sweating, chills.      I saw her initially on 11/15/2018.  Since that time she had a 2 phlebotomy and IV normal saline infusion, and she has good tolerance.  She reports no fainting no dizziness.  This patient was also tested positive for heterozygous factor II mutation on 11/15/2018.    Pretreatment iron study reported at ferritin 270 ng/mL, free iron 79 TIBC 386 saturation 20% on 11/28/2018.  Hb 15.0, ,000 and WBC 6500.  Liver panel ALT 50 AST 30 alkaline phosphatase was 118 and the bilirubin 0.3.    Laboratory study on 12/20/2018 reported much improved ferritin 64.3, free iron 71 TIBC 375 and iron saturation 19%.  Hb 13.3, normal WBC and platelets.  Normalized ALT 28, AST 21, alkaline phosphatase is 116 and total bilirubin 0.4.  Patient had a phlebotomy of 500 mL.     She had laboratory studies on 6/6/2019 reporting ferritin 35.4, free iron 67 TIBC 411 and iron saturation 16%.  Her hemoglobin was at baseline 13.3, normal WBC 7700 and platelets 218,000.  Her ferritin is within the target, no phlebotomy at that time.     Laboratory study on 12/12/2019 reported ferritin 39.4, iron saturation 17%, and hemoglobin 13.8, hematocrit 44.1%, MCV 88.0, MCHC 31.3 platelets 217,000 and WBC 6200.  Her chemistry lab reported normal liver function panel, normal renal function normal electrolytes, marginal glucose 106.    MEDICATIONS    Current Outpatient Medications:   •  amitriptyline (ELAVIL) 100 MG tablet, Take  by mouth., Disp: , Rfl:   •  atorvastatin (LIPITOR) 40 MG tablet, Take 40 mg by mouth Daily., Disp: , Rfl:   •  Butalbital-APAP-Caffeine -40 MG per capsule, Take  by mouth., Disp: , Rfl:   •  Cholecalciferol (VITAMIN D3) 2000 units tablet, Take  by mouth., Disp: , Rfl:   •  EPINEPHrine (EPIPEN) 0.3  MG/0.3ML solution auto-injector injection, epinephrine 0.3 mg/0.3 mL injection, auto-injector  Per MD Instructions, Disp: , Rfl:   •  esomeprazole (nexIUM) 40 MG capsule, Take 40 mg by mouth Every Morning Before Breakfast., Disp: , Rfl:   •  fluticasone (FLONASE) 50 MCG/ACT nasal spray, , Disp: , Rfl:   •  gabapentin (NEURONTIN) 300 MG capsule, Take  by mouth., Disp: , Rfl:   •  glucose blood test strip, Accu-Chek Nicky Plus test strips  check BS daily, Disp: , Rfl:   •  levothyroxine (SYNTHROID, LEVOTHROID) 100 MCG tablet, Take  by mouth., Disp: , Rfl:   •  linagliptin (TRADJENTA) 5 MG tablet tablet, Take 5 mg by mouth Daily., Disp: , Rfl:   •  metFORMIN (GLUCOPHAGE) 1000 MG tablet, Take 1,000 mg by mouth 2 (Two) Times a Day With Meals., Disp: , Rfl:   •  metFORMIN ER (GLUCOPHAGE-XR) 500 MG 24 hr tablet, , Disp: , Rfl:   •  metoclopramide (REGLAN) 5 MG tablet, Take 5 mg by mouth 4 (Four) Times a Day Before Meals & at Bedtime., Disp: , Rfl:   •  montelukast (SINGULAIR) 4 MG pack, Take  by mouth., Disp: , Rfl:   •  Omega-3 Fatty Acids (OMEGA 3 PO), Take  by mouth., Disp: , Rfl:   •  pramipexole (MIRAPEX) 0.125 MG tablet, Take 0.125 mg by mouth every night., Disp: , Rfl:   •  raNITIdine (ZANTAC) 150 MG tablet, Take 150 mg by mouth 2 (Two) Times a Day., Disp: , Rfl:   •  topiramate (TOPAMAX) 50 MG tablet, Take 50 mg by mouth 2 (Two) Times a Day., Disp: , Rfl:   •  vitamin B-12 (CYANOCOBALAMIN) 500 MCG tablet, Take 500 mcg by mouth Daily., Disp: , Rfl:     Current Facility-Administered Medications:   •  nitroglycerin (NITROSTAT) SL tablet 0.4 mg, 0.4 mg, Sublingual, Q5 Min PRN, Shereen Flood MD  •  nitroglycerin (NITROSTAT) SL tablet 0.4 mg, 0.4 mg, Sublingual, Q5 Min PRN, Griffin Wright III, MD  •  sodium chloride 0.9 % flush 10 mL, 10 mL, Intravenous, PRN, Shereen Flood MD  •  sodium chloride 0.9 % flush 10 mL, 10 mL, Intravenous, PRN, Griffin Wright III, MD    ALLERGIES:     Allergies   Allergen Reactions   • Sulfa  Antibiotics Dizziness       SOCIAL HISTORY:       Social History     Socioeconomic History   • Marital status: Single     Spouse name: Not on file   • Number of children: 2    • Years of education: High school education    • Highest education level: High school    Social Needs   • Financial resource strain: Not on file   • Food insecurity - worry: Not on file   • Food insecurity - inability: Not on file   • Transportation needs - medical: Not on file   • Transportation needs - non-medical: Not on file   Occupational History     Employer: DISABLED, previous had factory job    Tobacco Use   • Smoking status: Never Smoker   Substance and Sexual Activity   • Alcohol use: No   • Drug use: No    • Sexual activity: Not on file         FAMILY HISTORY:  Family History   Problem Relation Age of Onset   • Breast cancer Maternal Aunt    • Diabetes Other    • Heart disease Other    • Heart disease Mother         CABG x3   • Diabetes Mother         Type 2   • Hypertension Mother    • Stroke Mother    • Lung cancer Father    • Liver cancer Brother 60   • Diabetes Brother      1 brother has hereditary hemochromatosis, liver cancer because HH post liver transplant, he is undergoing phlebotomy treatment.  A sister has hemochromatosis undergoing phlebotomy treatment.  Mother had a DVT, found to have prothrombin gene mutation.    REVIEW OF SYSTEMS:  Review of Systems   Constitutional: Positive for unexpected weight change (Lost 30 pounds in 2020 due to dysphagia caused by esophageal stricture). Negative for appetite change, diaphoresis and fatigue.   HENT: Negative for congestion, hearing loss, nosebleeds and sore throat.    Eyes: Positive for visual disturbance (Poor vision). Negative for photophobia.   Respiratory: Negative for cough, chest tightness and shortness of breath.    Cardiovascular: Negative for chest pain, palpitations and leg swelling.   Gastrointestinal: Negative for abdominal pain, blood in stool, constipation,  "diarrhea and nausea.   Endocrine: Negative for cold intolerance and polyuria.   Genitourinary: Negative for dysuria and hematuria.   Musculoskeletal: Positive for arthralgias and back pain.   Skin: Negative for color change and pallor.   Allergic/Immunologic: Negative for food allergies and immunocompromised state.   Neurological: Negative for dizziness, weakness and headaches.   Hematological: Negative for adenopathy. Does not bruise/bleed easily.   Psychiatric/Behavioral: Negative for agitation and confusion.              Vitals:    12/10/20 1329   BP: 111/73   Pulse: 66   Resp: 16   Temp: 97.3 °F (36.3 °C)   TempSrc: Temporal   SpO2: 98%   Weight: 71 kg (156 lb 8 oz)   Height: 165 cm (64.96\")   PainSc: 0-No pain     Current Status 12/10/2020   ECOG score 1          PHYSICAL EXAM:    GENERAL:  Well-developed, well-nourished  female in no acute distress.   SKIN:  Warm, dry without rashes, purpura or petechiae.  HEAD:  Normocephalic.  EYES:  Pupils equal, round.  Conjunctivae normal.  EARS:  Hearing intact.  NOSE:  Patient wears mask due to the pandemic coronavirus infection.   MOUTH: Same as above.  THROAT: Same as above.  NECK:  Supple; no thyromegaly or masses.  LYMPHATICS:  No cervical, supraclavicular adenopathy.  CHEST: Normal respiratory effort.  Lungs clear to auscultation. Good airflow.  CARDIAC:  Regular rate and rhythm without murmurs. Normal S1,S2.  ABDOMEN:  Soft, nontender with no organomegaly or masses.  Bowel sounds normal.  EXTREMITIES:  No clubbing, cyanosis or edema.  NEUROLOGICAL:  Cranial Nerves II-XII grossly intact.  No focal neurological deficits.  PSYCHIATRIC:  Normal affect and mood.          RECENT LABS:    Lab Results   Component Value Date    WBC 6.62 12/10/2020    HGB 14.1 12/10/2020    HCT 44.5 12/10/2020    MCV 89.2 12/10/2020     12/10/2020     Lab Results   Component Value Date    NEUTROABS 3.74 12/10/2020     Lab Results   Component Value Date    IRON 71 12/10/2020 "    TIBC 438 12/10/2020    FERRITIN 26.30 12/10/2020     Iron saturation 16 % on 12/12/2020.  Glucose   Date Value Ref Range Status   12/10/2020 93 65 - 99 mg/dL Final     BUN   Date Value Ref Range Status   12/10/2020 16 6 - 20 mg/dL Final     Creatinine   Date Value Ref Range Status   12/10/2020 0.82 0.57 - 1.00 mg/dL Final     Sodium   Date Value Ref Range Status   12/10/2020 140 136 - 145 mmol/L Final     Potassium   Date Value Ref Range Status   12/10/2020 3.9 3.5 - 5.2 mmol/L Final     Chloride   Date Value Ref Range Status   12/10/2020 106 98 - 107 mmol/L Final     CO2   Date Value Ref Range Status   12/10/2020 29.5 (H) 22.0 - 29.0 mmol/L Final     Calcium   Date Value Ref Range Status   12/10/2020 10.3 8.6 - 10.5 mg/dL Final     Total Protein   Date Value Ref Range Status   12/10/2020 7.7 6.0 - 8.5 g/dL Final     Albumin   Date Value Ref Range Status   12/10/2020 4.50 3.50 - 5.20 g/dL Final     ALT (SGPT)   Date Value Ref Range Status   12/10/2020 24 1 - 33 U/L Final     AST (SGOT)   Date Value Ref Range Status   12/10/2020 19 1 - 32 U/L Final     Alkaline Phosphatase   Date Value Ref Range Status   12/10/2020 94 39 - 117 U/L Final     Total Bilirubin   Date Value Ref Range Status   12/10/2020 0.3 0.0 - 1.2 mg/dL Final     eGFR Non  Amer   Date Value Ref Range Status   12/10/2020 72 >60 mL/min/1.73 Final     BUN/Creatinine Ratio   Date Value Ref Range Status   12/10/2020 19.5 7.0 - 25.0 Final     Anion Gap   Date Value Ref Range Status   12/10/2020 4.5 (L) 5.0 - 15.0 mmol/L Final           Assessment/Plan      1.  Hereditary hemochromatosis.  Patient had liver enzyme elevation at the time of diagnosis.  Her ferritin was elevated at up to 600 ng/mL, however was not as high as checked in our labs.  Nevertheless we started patient on phlebotomy and that she had a great response after 2 sessions of phlebotomy in November 2018.  She had third of phlebotomy in late December 2018 with a target ferritin of  50..  At that time laboratory study showed ferritin 64 and iron saturation 19%.     On 6/6/2019, iron studies showed ferritin 35 and iron saturation 16% had a normal hemoglobin 13.3.  No phlebotomy at that time.    On 12/12/2019 lab studies showed a ferritin 39.4, with iron saturation 17%.  Maintains normal hemoglobin.  She complains of fatigue.  Certainly there is no phlebotomy needed today.  Her iron study actually has evidence of mild iron deficiency now.  I wonder whether her fatigue is related to that.  We will need to pay more attention in the future, may consider LISA but target ferritin to 100 ng/mL to see if that will balance her fatigue and goal of treatment.    Today on 12/10/2020, normal CBC, low iron saturation 16% with ferritin 26.3 ng/mL.  Unremarkable CMP.  No evidence of reaccumulation of iron, this is likely due to her poor oral intake earlier this year due to esophageal stricture.        2.  Positive for heterozygous factor II (prothrombin gene) mutation.  She presently had no thrombophilia.  She had a positive family history, with her mother having the same gene mutation and history of DVT.     Previously I asked patient to be mobile to avoid blood staggering in her leg to form clots.  During travel such as on airplane or by automobile, she needs to walk around for a few minutes every 1-2 hours.  She voiced understanding.     PLAN:  1.  No phlebotomy today.  2.  Patient return in 12 months for reevaluation, will check CBC CMP, ferritin and iron profile.  Will reevaluate and may consider to increased target of ferritin to 100 ng/mL.    TAQUERIA ZURITA M.D., Ph.D.    12/10/2020     CC:  Gage Colon MD     Dictated using Dragon Dictation.

## 2021-12-14 RX ORDER — SODIUM CHLORIDE 9 MG/ML
250 INJECTION, SOLUTION INTRAVENOUS ONCE
OUTPATIENT
Start: 2021-12-16

## 2021-12-16 ENCOUNTER — LAB (OUTPATIENT)
Dept: OTHER | Facility: HOSPITAL | Age: 61
End: 2021-12-16

## 2021-12-16 ENCOUNTER — OFFICE VISIT (OUTPATIENT)
Dept: ONCOLOGY | Facility: CLINIC | Age: 61
End: 2021-12-16

## 2021-12-16 ENCOUNTER — APPOINTMENT (OUTPATIENT)
Dept: ONCOLOGY | Facility: HOSPITAL | Age: 61
End: 2021-12-16

## 2021-12-16 VITALS
RESPIRATION RATE: 18 BRPM | SYSTOLIC BLOOD PRESSURE: 101 MMHG | BODY MASS INDEX: 25.74 KG/M2 | HEIGHT: 65 IN | HEART RATE: 66 BPM | WEIGHT: 154.5 LBS | OXYGEN SATURATION: 98 % | TEMPERATURE: 97.5 F | DIASTOLIC BLOOD PRESSURE: 67 MMHG

## 2021-12-16 DIAGNOSIS — R74.8 ELEVATED LIVER ENZYMES: ICD-10-CM

## 2021-12-16 DIAGNOSIS — D68.52 PROTHROMBIN GENE MUTATION (HCC): ICD-10-CM

## 2021-12-16 DIAGNOSIS — E83.110 HEREDITARY HEMOCHROMATOSIS (HCC): Primary | ICD-10-CM

## 2021-12-16 LAB
ALBUMIN SERPL-MCNC: 4.3 G/DL (ref 3.5–5.2)
ALBUMIN/GLOB SERPL: 1.2 G/DL
ALP SERPL-CCNC: 103 U/L (ref 39–117)
ALT SERPL W P-5'-P-CCNC: 25 U/L (ref 1–33)
ANION GAP SERPL CALCULATED.3IONS-SCNC: 9.3 MMOL/L (ref 5–15)
AST SERPL-CCNC: 16 U/L (ref 1–32)
BASOPHILS # BLD AUTO: 0.03 10*3/MM3 (ref 0–0.2)
BASOPHILS NFR BLD AUTO: 0.6 % (ref 0–1.5)
BILIRUB SERPL-MCNC: 0.4 MG/DL (ref 0–1.2)
BUN SERPL-MCNC: 16 MG/DL (ref 6–20)
BUN/CREAT SERPL: 18.2 (ref 7–25)
CALCIUM SPEC-SCNC: 9.7 MG/DL (ref 8.6–10.5)
CHLORIDE SERPL-SCNC: 105 MMOL/L (ref 98–107)
CO2 SERPL-SCNC: 26.7 MMOL/L (ref 22–29)
CREAT SERPL-MCNC: 0.88 MG/DL (ref 0.57–1)
DEPRECATED RDW RBC AUTO: 41.2 FL (ref 37–54)
EOSINOPHIL # BLD AUTO: 0.21 10*3/MM3 (ref 0–0.4)
EOSINOPHIL NFR BLD AUTO: 3.9 % (ref 0.3–6.2)
ERYTHROCYTE [DISTWIDTH] IN BLOOD BY AUTOMATED COUNT: 12.5 % (ref 12.3–15.4)
FERRITIN SERPL-MCNC: 46.1 NG/ML (ref 13–150)
GFR SERPL CREATININE-BSD FRML MDRD: 66 ML/MIN/1.73
GLOBULIN UR ELPH-MCNC: 3.5 GM/DL
GLUCOSE SERPL-MCNC: 93 MG/DL (ref 65–99)
HCT VFR BLD AUTO: 42.3 % (ref 34–46.6)
HGB BLD-MCNC: 13.4 G/DL (ref 12–15.9)
IMM GRANULOCYTES # BLD AUTO: 0.01 10*3/MM3 (ref 0–0.05)
IMM GRANULOCYTES NFR BLD AUTO: 0.2 % (ref 0–0.5)
IRON 24H UR-MRATE: 107 MCG/DL (ref 37–145)
IRON SATN MFR SERPL: 27 % (ref 20–50)
LYMPHOCYTES # BLD AUTO: 1.87 10*3/MM3 (ref 0.7–3.1)
LYMPHOCYTES NFR BLD AUTO: 34.8 % (ref 19.6–45.3)
MCH RBC QN AUTO: 28.5 PG (ref 26.6–33)
MCHC RBC AUTO-ENTMCNC: 31.7 G/DL (ref 31.5–35.7)
MCV RBC AUTO: 89.8 FL (ref 79–97)
MONOCYTES # BLD AUTO: 0.44 10*3/MM3 (ref 0.1–0.9)
MONOCYTES NFR BLD AUTO: 8.2 % (ref 5–12)
NEUTROPHILS NFR BLD AUTO: 2.82 10*3/MM3 (ref 1.7–7)
NEUTROPHILS NFR BLD AUTO: 52.3 % (ref 42.7–76)
NRBC BLD AUTO-RTO: 0 /100 WBC (ref 0–0.2)
PLATELET # BLD AUTO: 222 10*3/MM3 (ref 140–450)
PMV BLD AUTO: 9 FL (ref 6–12)
POTASSIUM SERPL-SCNC: 3.9 MMOL/L (ref 3.5–5.2)
PROT SERPL-MCNC: 7.8 G/DL (ref 6–8.5)
RBC # BLD AUTO: 4.71 10*6/MM3 (ref 3.77–5.28)
SODIUM SERPL-SCNC: 141 MMOL/L (ref 136–145)
TIBC SERPL-MCNC: 402 MCG/DL (ref 298–536)
TRANSFERRIN SERPL-MCNC: 270 MG/DL (ref 200–360)
WBC NRBC COR # BLD: 5.38 10*3/MM3 (ref 3.4–10.8)

## 2021-12-16 PROCEDURE — 82728 ASSAY OF FERRITIN: CPT | Performed by: INTERNAL MEDICINE

## 2021-12-16 PROCEDURE — 99213 OFFICE O/P EST LOW 20 MIN: CPT | Performed by: INTERNAL MEDICINE

## 2021-12-16 PROCEDURE — 80053 COMPREHEN METABOLIC PANEL: CPT | Performed by: INTERNAL MEDICINE

## 2021-12-16 PROCEDURE — 36415 COLL VENOUS BLD VENIPUNCTURE: CPT

## 2021-12-16 PROCEDURE — 83540 ASSAY OF IRON: CPT | Performed by: INTERNAL MEDICINE

## 2021-12-16 PROCEDURE — 84466 ASSAY OF TRANSFERRIN: CPT | Performed by: INTERNAL MEDICINE

## 2021-12-16 PROCEDURE — 85025 COMPLETE CBC W/AUTO DIFF WBC: CPT | Performed by: INTERNAL MEDICINE

## 2021-12-16 NOTE — PROGRESS NOTES
.     REASON FOR FOLLOWUP:     1. Hereditary hemochromatosis positive for homozygous HFE mutation H63D.  Patient had elevated liver function panel, she was started on phlebotomy on 2018.   2.  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  Positive family history.                                 HISTORY OF PRESENT ILLNESS:  The patient is a 61 y.o. year old female with history of diabetes, hyperlipidemia, esophageal stricture, and hereditary hemochromatosis who presents for annual reevaluation of hereditary hemochromatosis.     Patient reports she is doing well, she already had posted dose of COVID-19 vaccine.  She also had flu vaccine this season.  Patient reports she is very cautious for preventing getting infected by COVID-19.    Patient reports her diabetes is well controlled.  She has stable weight.  Continue oral vitamin B12.    Patient has no specific complaints.    Laboratory study today on 2021 reported ferritin 46.1 ng/mL and iron saturation 27% with  TIBC 402.  She has normal hemoglobin Hb 13.4, platelets 222,000 WBC 5380.  Chemistry lab reported complete normal CMP, including liver function panel.      Past Medical History:   Diagnosis Date   • Allergic rhinitis    • Anemia    • Depression    • Diabetes mellitus (HCC)    • Disease of thyroid gland    • GERD (gastroesophageal reflux disease)    • Hyperlipidemia    • IBS (irritable bowel syndrome)    • Iron overload    • Malignant neoplasm of cervix (HCC)    • Multiple sclerosis (HCC)    • RLS (restless legs syndrome)    • Seizures (HCC)    • Sensory neuropathy    • Vitamin D deficiency      Past Surgical History:   Procedure Laterality Date   •  SECTION      x2   • CYSTOSCOPY URETERAL BALLOON DILATATION      x4   • ELBOW PROCEDURE     • STOMACH SURGERY         HEMATOLOGIC/ONCOLOGIC HISTORY:  The patient is a 58 y.o. year old female who is here on 11/15/2008 for initial  evaluation and treatment plan for newly diagnosed hereditary hemochromatosis.      This patient has a family history of hereditary hemachromatosis and her mother also had factor II/prothrombin gene mutation and history of DVT.  This patient had examination at her primary care physician, Dr. Colon’ office, on 09/04/2018. Laboratory study at that time reported abnormal liver function panel including AST 86, ALT 99, alkaline phosphatase 121 with normal total bilirubin 0.4. She had total serum protein 7.7 and albumin 4.4. Her electrolytes were normal. Patient had elevated glucose 231. Her CBC showed mild erythrocytosis with RBC 5.3 million/mcL, upper normal hematocrit of 46.6% and hemoglobin 15.3%. Normal platelets at 87,000. Normal WBC 8400 including neutrophils 4100 and lymphocytes 3300, monocytes 500. Her ferritin level was elevated at 621 ng/mL, iron saturation 35% with free iron 406 and TIBC 302.  Normal B12 level 945, folic acid 11.5.  Hemoglobin A1c 7.0 and vitamin D 25.  Further laboratory study around 09/07/2018 reported negative for hepatitis B surface antigen, hepatitis B surface antibody, HCV antibody, HIV, and normal GGT 51.      Further studies on 10/16/2018 reported positive for homozygous HFE mutation at H63D.      This patient reports she has a family history of hemochromatosis and also prothrombin gene mutation.  Her mother had DVT and was found having factor II/prothrombin gene mutation.  One of her brothers and 1 of her sisters have hereditary hemochromatosis and both of them are high patient for treatment with frequent phlebotomies.  The patient herself has never had thrombophilia previously.    The patient reports she had 2 term pregnancies.  She had menopause about 15 years ago.  She denies bleeding of any kind.  She was diagnosed with diabetes about 1-1/2 years ago.      The patient reports poor vision and intermittent diarrhea.  She has chronic low back pain and joint pain.  She has significant  headaches.  No fainting or syncope.  She denies fevers, sweating, chills.      I saw her initially on 11/15/2018.  Since that time she had a 2 phlebotomy and IV normal saline infusion, and she has good tolerance.  She reports no fainting no dizziness.  This patient was also tested positive for heterozygous factor II mutation on 11/15/2018.    Pretreatment iron study reported at ferritin 270 ng/mL, free iron 79 TIBC 386 saturation 20% on 11/28/2018.  Hb 15.0, ,000 and WBC 6500.  Liver panel ALT 50 AST 30 alkaline phosphatase was 118 and the bilirubin 0.3.    Laboratory study on 12/20/2018 reported much improved ferritin 64.3, free iron 71 TIBC 375 and iron saturation 19%.  Hb 13.3, normal WBC and platelets.  Normalized ALT 28, AST 21, alkaline phosphatase is 116 and total bilirubin 0.4.  Patient had a phlebotomy of 500 mL.     She had laboratory studies on 6/6/2019 reporting ferritin 35.4, free iron 67 TIBC 411 and iron saturation 16%.  Her hemoglobin was at baseline 13.3, normal WBC 7700 and platelets 218,000.  Her ferritin is within the target, no phlebotomy at that time.     Laboratory study on 12/12/2019 reported ferritin 39.4, iron saturation 17%, and hemoglobin 13.8, hematocrit 44.1%, MCV 88.0, MCHC 31.3 platelets 217,000 and WBC 6200.  Her chemistry lab reported normal liver function panel, normal renal function normal electrolytes, marginal glucose 106.    Patient reports in the summer of 2020 had surgical procedure for esophageal stricture.  She was at a point not even able to swallow water.  She lost 30 pounds during the process.  She is better now after the procedure, however has not back to baseline yet.    According to medical records, she had EGD examination with dilatation of the esophageal structure up to 20 cm and also had colonoscopy with polypectomy on 7/21/2020 by Dr. Ball.    Laboratory studies on 12/10/2020 reported hemoglobin 14.1, MCV 89.2, platelets 232,000 WBC 6620.  Iron study  showed ferritin 26.3 ng/mL, free iron 71, and TIBC 138 and iron saturation 16%.  Chemistry lab reported unremarkable CMP..      MEDICATIONS    Current Outpatient Medications:   •  amitriptyline (ELAVIL) 100 MG tablet, Take  by mouth., Disp: , Rfl:   •  atorvastatin (LIPITOR) 40 MG tablet, Take 40 mg by mouth Daily., Disp: , Rfl:   •  Butalbital-APAP-Caffeine -40 MG per capsule, Take  by mouth., Disp: , Rfl:   •  Cholecalciferol (VITAMIN D3) 2000 units tablet, Take  by mouth., Disp: , Rfl:   •  EPINEPHrine (EPIPEN) 0.3 MG/0.3ML solution auto-injector injection, epinephrine 0.3 mg/0.3 mL injection, auto-injector  Per MD Instructions, Disp: , Rfl:   •  esomeprazole (nexIUM) 40 MG capsule, Take 40 mg by mouth Every Morning Before Breakfast., Disp: , Rfl:   •  fluticasone (FLONASE) 50 MCG/ACT nasal spray, , Disp: , Rfl:   •  gabapentin (NEURONTIN) 300 MG capsule, Take  by mouth., Disp: , Rfl:   •  glucose blood test strip, Accu-Chek Nicky Plus test strips  check BS daily, Disp: , Rfl:   •  levothyroxine (SYNTHROID, LEVOTHROID) 100 MCG tablet, Take  by mouth., Disp: , Rfl:   •  linagliptin (TRADJENTA) 5 MG tablet tablet, Take 5 mg by mouth Daily., Disp: , Rfl:   •  metFORMIN (GLUCOPHAGE) 1000 MG tablet, Take 1,000 mg by mouth 2 (Two) Times a Day With Meals., Disp: , Rfl:   •  metFORMIN ER (GLUCOPHAGE-XR) 500 MG 24 hr tablet, , Disp: , Rfl:   •  metoclopramide (REGLAN) 5 MG tablet, Take 5 mg by mouth 4 (Four) Times a Day Before Meals & at Bedtime., Disp: , Rfl:   •  montelukast (SINGULAIR) 4 MG pack, Take  by mouth., Disp: , Rfl:   •  Omega-3 Fatty Acids (OMEGA 3 PO), Take  by mouth., Disp: , Rfl:   •  pramipexole (MIRAPEX) 0.125 MG tablet, Take 0.125 mg by mouth every night., Disp: , Rfl:   •  raNITIdine (ZANTAC) 150 MG tablet, Take 150 mg by mouth 2 (Two) Times a Day., Disp: , Rfl:   •  topiramate (TOPAMAX) 50 MG tablet, Take 50 mg by mouth 2 (Two) Times a Day., Disp: , Rfl:   •  vitamin B-12 (CYANOCOBALAMIN) 500  MCG tablet, Take 500 mcg by mouth Daily., Disp: , Rfl:     Current Facility-Administered Medications:   •  nitroglycerin (NITROSTAT) SL tablet 0.4 mg, 0.4 mg, Sublingual, Q5 Min PRN, Shereen Flood MD  •  nitroglycerin (NITROSTAT) SL tablet 0.4 mg, 0.4 mg, Sublingual, Q5 Min PRN, Griffin Wright III, MD  •  sodium chloride 0.9 % flush 10 mL, 10 mL, Intravenous, PRN, Shereen Flood MD  •  sodium chloride 0.9 % flush 10 mL, 10 mL, Intravenous, PRN, Griffin Wright III, MD    ALLERGIES:     Allergies   Allergen Reactions   • Sulfa Antibiotics Dizziness       SOCIAL HISTORY:       Social History     Socioeconomic History   • Marital status: Single     Spouse name: Not on file   • Number of children: 2    • Years of education: High school education    • Highest education level: High school    Social Needs   • Financial resource strain: Not on file   • Food insecurity - worry: Not on file   • Food insecurity - inability: Not on file   • Transportation needs - medical: Not on file   • Transportation needs - non-medical: Not on file   Occupational History     Employer: DISABLED, previous had factory job    Tobacco Use   • Smoking status: Never Smoker   Substance and Sexual Activity   • Alcohol use: No   • Drug use: No    • Sexual activity: Not on file         FAMILY HISTORY:  Family History   Problem Relation Age of Onset   • Breast cancer Maternal Aunt    • Diabetes Other    • Heart disease Other    • Heart disease Mother         CABG x3   • Diabetes Mother         Type 2   • Hypertension Mother    • Stroke Mother    • Lung cancer Father    • Liver cancer Brother 60   • Diabetes Brother      1 brother has hereditary hemochromatosis, liver cancer because HH post liver transplant, he is undergoing phlebotomy treatment.  A sister has hemochromatosis undergoing phlebotomy treatment.  Mother had a DVT, found to have prothrombin gene mutation.             Vitals:    12/16/21 1340   BP: 101/67   Pulse: 66   Resp: 18   Temp: 97.5 °F  "(36.4 °C)   TempSrc: Temporal   SpO2: 98%   Weight: 70.1 kg (154 lb 8 oz)   Height: 165.1 cm (65\")   PainSc: 0-No pain     Current Status 12/10/2020   ECOG score 1          PHYSICAL EXAM:    GENERAL:  Well-developed, well-nourished female in no acute distress.    SKIN:  Warm, dry without rashes, purpura or petechiae.  HEENT:  Normocephalic.  Wearing mask.   LYMPHATICS:  No cervical, supraclavicular adenopathy.  CHEST: Normal respiratory effort.  Lungs clear to auscultation. Good airflow.  CARDIAC:  Regular rate and rhythm without murmurs. Normal S1,S2.  ABDOMEN:  Soft, nontender with no organomegaly or masses.  Bowel sounds normal.  EXTREMITIES:  No clubbing, cyanosis or edema. Left ankle brace.   NEUROLOGICAL:  Grossly intact.    PSYCHIATRIC:  Normal affect and mood.          RECENT LABS:    Lab Results   Component Value Date    WBC 5.38 12/16/2021    HGB 13.4 12/16/2021    HCT 42.3 12/16/2021    MCV 89.8 12/16/2021     12/16/2021     Lab Results   Component Value Date    NEUTROABS 2.82 12/16/2021     Lab Results   Component Value Date    IRON 107 12/16/2021    TIBC 402 12/16/2021    FERRITIN 46.10 12/16/2021     Iron saturation 27% on 12/16/2021.    Glucose   Date Value Ref Range Status   12/16/2021 93 65 - 99 mg/dL Final     BUN   Date Value Ref Range Status   12/16/2021 16 6 - 20 mg/dL Final     Creatinine   Date Value Ref Range Status   12/16/2021 0.88 0.57 - 1.00 mg/dL Final     Sodium   Date Value Ref Range Status   12/16/2021 141 136 - 145 mmol/L Final     Potassium   Date Value Ref Range Status   12/16/2021 3.9 3.5 - 5.2 mmol/L Final     Chloride   Date Value Ref Range Status   12/16/2021 105 98 - 107 mmol/L Final     CO2   Date Value Ref Range Status   12/16/2021 26.7 22.0 - 29.0 mmol/L Final     Calcium   Date Value Ref Range Status   12/16/2021 9.7 8.6 - 10.5 mg/dL Final     Total Protein   Date Value Ref Range Status   12/16/2021 7.8 6.0 - 8.5 g/dL Final     Albumin   Date Value Ref Range Status "   12/16/2021 4.30 3.50 - 5.20 g/dL Final     ALT (SGPT)   Date Value Ref Range Status   12/16/2021 25 1 - 33 U/L Final     AST (SGOT)   Date Value Ref Range Status   12/16/2021 16 1 - 32 U/L Final     Alkaline Phosphatase   Date Value Ref Range Status   12/16/2021 103 39 - 117 U/L Final     Total Bilirubin   Date Value Ref Range Status   12/16/2021 0.4 0.0 - 1.2 mg/dL Final     eGFR Non  Amer   Date Value Ref Range Status   12/16/2021 66 >60 mL/min/1.73 Final     BUN/Creatinine Ratio   Date Value Ref Range Status   12/16/2021 18.2 7.0 - 25.0 Final     Anion Gap   Date Value Ref Range Status   12/16/2021 9.3 5.0 - 15.0 mmol/L Final           Assessment/Plan      1.  Hereditary hemochromatosis.  Patient had liver enzyme elevation at the time of diagnosis.  Her ferritin was elevated at up to 600 ng/mL, however was not as high as checked in our labs.    · Nevertheless we started patient on phlebotomy and she had a great response after 2 sessions of phlebotomy in November 2018.  She had third phlebotomy in late December 2018 with a target ferritin of 50.  At that time laboratory study showed ferritin 64 and iron saturation 19%.   · On 6/6/2019, iron studies showed ferritin 35 and iron saturation 16% and normal hemoglobin 13.3.  No phlebotomy at that time.  · On 12/12/2019 ferritin 39.4, iron saturation 17%.  Maintains normal hemoglobin and normalized liver function panel.  She complains of fatigue.  Certainly there is no phlebotomy needed today.  Her iron study actually has evidence of mild iron deficiency now.  I wonder whether her fatigue is related to that.  We will need to pay more attention in the future, may consider LISA but target ferritin to 100 ng/mL to see if that will balance her fatigue and goal of treatment.  · On 12/10/2020, normal CBC, low iron saturation 16% with ferritin 26.3 ng/mL.  Unremarkable CMP.  No evidence of reaccumulation of iron, this is likely due to her poor oral intake earlier this  year due to esophageal stricture.    · Today on 12/16/2021 patient is ferritin 46.1 and iron saturation 27%, normal liver function panel, and a normal hemoglobin.  No need for phlebotomy.      2.  Positive for heterozygous factor II (prothrombin gene) mutation.  She presently had no thrombophilia.  She had positive family history, with her mother having the same gene mutation and history of DVT.   · Previously I asked patient to be mobile to avoid blood staggering in her leg to form clots.  During travel such as on airplane or by automobile, she needs to walk around for a few minutes every 1-2 hours.  She voiced understanding.   · Patient reports no edema in legs, no chest pain or dyspnea.      PLAN:  1.  No phlebotomy today.  2.  We will arrange patient for reevaluation in 12 months, with laboratory studies CBC CMP, ferritin and iron profile.  Will reevaluate and may consider to increased target of ferritin to 100 ng/mL.    Discussed with the patient about laboratory results and the management plans.  Patient voiced understanding.      TAQUERIA ZURITA M.D., Ph.D.    12/16/2021     CC:  Gage Colon MD     Dictated using Dragon Dictation.

## 2022-06-27 ENCOUNTER — HOSPITAL ENCOUNTER (EMERGENCY)
Facility: HOSPITAL | Age: 62
Discharge: LEFT BEFORE DISCHARGE | End: 2022-06-27

## 2022-06-27 VITALS — HEART RATE: 86 BPM | TEMPERATURE: 96.5 F | OXYGEN SATURATION: 99 % | RESPIRATION RATE: 16 BRPM

## 2022-06-27 PROCEDURE — 99211 OFF/OP EST MAY X REQ PHY/QHP: CPT

## 2022-06-27 NOTE — ED TRIAGE NOTES
All triage performed with this RN wearing appropriate PPE.  Pt placed in mask upon arrival to ED.  Patient c/o bee sting to left wrist. She used her epi pen 25 minutes PTA.

## 2022-06-29 ENCOUNTER — HOSPITAL ENCOUNTER (OUTPATIENT)
Dept: CARDIOLOGY | Facility: HOSPITAL | Age: 62
Discharge: HOME | End: 2022-06-29

## 2022-06-29 ENCOUNTER — OFFICE VISIT (OUTPATIENT)
Dept: CARDIOLOGY | Facility: CLINIC | Age: 62
End: 2022-06-29

## 2022-06-29 ENCOUNTER — LAB (OUTPATIENT)
Dept: LAB | Facility: HOSPITAL | Age: 62
End: 2022-06-29

## 2022-06-29 VITALS
BODY MASS INDEX: 26.56 KG/M2 | HEART RATE: 71 BPM | SYSTOLIC BLOOD PRESSURE: 116 MMHG | HEIGHT: 65 IN | WEIGHT: 159.4 LBS | DIASTOLIC BLOOD PRESSURE: 78 MMHG

## 2022-06-29 DIAGNOSIS — R07.9 EXERTIONAL CHEST PAIN: ICD-10-CM

## 2022-06-29 DIAGNOSIS — R06.02 EXERTIONAL SHORTNESS OF BREATH: ICD-10-CM

## 2022-06-29 DIAGNOSIS — R07.9 EXERTIONAL CHEST PAIN: Primary | ICD-10-CM

## 2022-06-29 LAB
ALBUMIN SERPL-MCNC: 4.8 G/DL (ref 3.5–5.2)
ALBUMIN/GLOB SERPL: 1.6 G/DL
ALP SERPL-CCNC: 134 U/L (ref 39–117)
ALT SERPL W P-5'-P-CCNC: 37 U/L (ref 1–33)
ANION GAP SERPL CALCULATED.3IONS-SCNC: 15 MMOL/L (ref 5–15)
AST SERPL-CCNC: 18 U/L (ref 1–32)
BH CV NUCLEAR PRIOR STUDY: 3
BH CV REST NUCLEAR ISOTOPE DOSE: 28.6 MCI
BH CV STRESS BP STAGE 1: NORMAL
BH CV STRESS COMMENTS STAGE 1: NORMAL
BH CV STRESS DOSE REGADENOSON STAGE 1: 0.4
BH CV STRESS DURATION MIN STAGE 1: 0
BH CV STRESS DURATION SEC STAGE 1: 10
BH CV STRESS HR STAGE 1: 96
BH CV STRESS NUCLEAR ISOTOPE DOSE: 28.6 MCI
BH CV STRESS PROTOCOL 1: NORMAL
BH CV STRESS RECOVERY BP: NORMAL MMHG
BH CV STRESS RECOVERY HR: 76 BPM
BH CV STRESS STAGE 1: 1
BILIRUB SERPL-MCNC: 0.2 MG/DL (ref 0–1.2)
BUN SERPL-MCNC: 16 MG/DL (ref 6–20)
BUN/CREAT SERPL: 18.8 (ref 7–25)
CALCIUM SPEC-SCNC: 9.6 MG/DL (ref 8.6–10.5)
CHLORIDE SERPL-SCNC: 106 MMOL/L (ref 98–107)
CHOLEST SERPL-MCNC: 152 MG/DL (ref 0–200)
CO2 SERPL-SCNC: 22 MMOL/L (ref 22–29)
CREAT SERPL-MCNC: 0.85 MG/DL (ref 0.57–1)
EGFRCR SERPLBLD CKD-EPI 2021: 79 ML/MIN/1.73
GLOBULIN UR ELPH-MCNC: 3 GM/DL
GLUCOSE SERPL-MCNC: 95 MG/DL (ref 65–99)
HDLC SERPL-MCNC: 47 MG/DL (ref 40–60)
LDLC SERPL CALC-MCNC: 81 MG/DL (ref 0–100)
LDLC/HDLC SERPL: 1.66 {RATIO}
LV EF NUC BP: 74 %
MAXIMAL PREDICTED HEART RATE: 161 BPM
NT-PROBNP SERPL-MCNC: 27.9 PG/ML (ref 0–900)
PERCENT MAX PREDICTED HR: 59.63 %
POTASSIUM SERPL-SCNC: 3.8 MMOL/L (ref 3.5–5.2)
PROT SERPL-MCNC: 7.8 G/DL (ref 6–8.5)
SODIUM SERPL-SCNC: 143 MMOL/L (ref 136–145)
STRESS BASELINE BP: NORMAL MMHG
STRESS BASELINE HR: 68 BPM
STRESS PERCENT HR: 70 %
STRESS POST EXERCISE DUR SEC: 10 SEC
STRESS POST PEAK BP: NORMAL MMHG
STRESS POST PEAK HR: 96 BPM
STRESS TARGET HR: 137 BPM
TRIGL SERPL-MCNC: 136 MG/DL (ref 0–150)
VLDLC SERPL-MCNC: 24 MG/DL (ref 5–40)

## 2022-06-29 PROCEDURE — 80061 LIPID PANEL: CPT

## 2022-06-29 PROCEDURE — 78492 MYOCRD IMG PET MLT RST&STRS: CPT

## 2022-06-29 PROCEDURE — 99204 OFFICE O/P NEW MOD 45 MIN: CPT | Performed by: INTERNAL MEDICINE

## 2022-06-29 PROCEDURE — 78492 MYOCRD IMG PET MLT RST&STRS: CPT | Performed by: INTERNAL MEDICINE

## 2022-06-29 PROCEDURE — 36415 COLL VENOUS BLD VENIPUNCTURE: CPT

## 2022-06-29 PROCEDURE — 93000 ELECTROCARDIOGRAM COMPLETE: CPT | Performed by: INTERNAL MEDICINE

## 2022-06-29 PROCEDURE — 93016 CV STRESS TEST SUPVJ ONLY: CPT | Performed by: INTERNAL MEDICINE

## 2022-06-29 PROCEDURE — 93018 CV STRESS TEST I&R ONLY: CPT | Performed by: INTERNAL MEDICINE

## 2022-06-29 PROCEDURE — 80053 COMPREHEN METABOLIC PANEL: CPT

## 2022-06-29 PROCEDURE — 0 RUBIDIUM CHLORIDE: Performed by: INTERNAL MEDICINE

## 2022-06-29 PROCEDURE — 83880 ASSAY OF NATRIURETIC PEPTIDE: CPT

## 2022-06-29 PROCEDURE — 25010000002 REGADENOSON 0.4 MG/5ML SOLUTION: Performed by: INTERNAL MEDICINE

## 2022-06-29 PROCEDURE — 93017 CV STRESS TEST TRACING ONLY: CPT

## 2022-06-29 PROCEDURE — A9555 RB82 RUBIDIUM: HCPCS | Performed by: INTERNAL MEDICINE

## 2022-06-29 RX ADMIN — REGADENOSON 0.4 MG: 0.08 INJECTION, SOLUTION INTRAVENOUS at 10:45

## 2022-06-29 NOTE — PROGRESS NOTES
PATIENTINFORMATION    Date of Office Visit: 2022  Encounter Provider: Kervin Cerda MD  Place of Service: [facility] CARDIOLOGY  Patient Name: Kaitlin Alvarez  : 1960    Subjective:     Encounter Date:2022      Patient ID: Kaitlin Alvarez is a 61 y.o. female.    Chief Complaint   Patient presents with   • new patient     HPI  Ms. Alvarez is a pleasant 62 yo lady came to cardiology clinic for evaluation of chest discomfort.  She reports intermittent exertional chest tightness localized to the left anterior chest of moderate severity relieved with rest.  This has been going on for several months now and chest gets tight every time she tries to exert herself or working in her yard.  Discomfort radiates to the left arm.  She reports Dr. Colon office had CT of her chest and concerned about her heart even if I do not have the report here with me.  She had a normal myocardial perfusion study in May 2019.  Past medical history significant for diabetes and hyperlipidemia she denies history of hypertension.  No prior coronary angiogram.    She has chronic foot drop on the left lower extremity and she wears ankle braces all the time.  She cannot walk on a treadmill.      ROS  All systems reviewed and negative except as noted in HPI.    Past Medical History:   Diagnosis Date   • Allergic rhinitis    • Anemia    • Depression    • Diabetes mellitus (HCC)    • Disease of thyroid gland    • GERD (gastroesophageal reflux disease)    • Hyperlipidemia    • IBS (irritable bowel syndrome)    • Iron overload    • Malignant neoplasm of cervix (HCC)    • Multiple sclerosis (HCC)    • RLS (restless legs syndrome)    • Seizures (HCC)    • Sensory neuropathy    • Vitamin D deficiency        Past Surgical History:   Procedure Laterality Date   •  SECTION      x2   • CYSTOSCOPY URETERAL BALLOON DILATATION      x4   • ELBOW PROCEDURE     • STOMACH SURGERY         Social History  "    Socioeconomic History   • Marital status: Single   • Number of children: 2   • Years of education: High School   Tobacco Use   • Smoking status: Never Smoker   • Smokeless tobacco: Never Used   Substance and Sexual Activity   • Alcohol use: No   • Drug use: No   • Sexual activity: Defer       Family History   Problem Relation Age of Onset   • Breast cancer Maternal Aunt    • Diabetes Other    • Heart disease Other    • Heart disease Mother         CABG x3   • Diabetes Mother         Type 2   • Hypertension Mother    • Stroke Mother    • Lung cancer Father    • Liver cancer Brother 60   • Diabetes Brother            ECG 12 Lead    Date/Time: 6/29/2022 9:19 AM  Performed by: Kervin Cerda MD  Authorized by: Kervin Cerda MD   Comparison: compared with previous ECG from 5/28/2019  Similar to previous ECG  Rhythm: sinus rhythm  Rate: normal  Conduction: conduction normal  ST Segments: ST segments normal  T Waves: T waves normal  QRS axis: normal  Other: no other findings    Clinical impression: normal ECG               Objective:     /78 (BP Location: Left arm, Patient Position: Sitting)   Pulse 71   Ht 165.1 cm (65\")   Wt 72.3 kg (159 lb 6.4 oz)   BMI 26.53 kg/m²  Body mass index is 26.53 kg/m².     Constitutional:       General: Not in acute distress.     Appearance: Well-developed. Not diaphoretic.   Eyes:      Pupils: Pupils are equal, round, and reactive to light.   HENT:      Head: Normocephalic and atraumatic.   Neck:      Thyroid: No thyromegaly.   Pulmonary:      Effort: Pulmonary effort is normal. No respiratory distress.      Breath sounds: Normal breath sounds. No wheezing. No rales.   Chest:      Chest wall: Not tender to palpatation.   Cardiovascular:      Normal rate. Regular rhythm.      No gallop.   Pulses:     Intact distal pulses.   Edema:     Peripheral edema absent.   Abdominal:      General: Bowel sounds are normal. There is no distension.      Palpations: Abdomen is " soft.      Tenderness: There is no guarding.   Musculoskeletal: Normal range of motion.         General: No deformity.      Cervical back: Normal range of motion and neck supple. Skin:     General: Skin is warm and dry.      Findings: No rash.   Neurological:      Mental Status: Alert and oriented to person, place, and time.      Cranial Nerves: No cranial nerve deficit.      Deep Tendon Reflexes: Reflexes are normal and symmetric.   Psychiatric:         Judgment: Judgment normal.         Review Of Data: I have reviewed pertinent recent labs, images and documents and pertinent findings included in HPI or assessment below.          Assessment/Plan:         1.  Exertional chest pain concerning for angina  2.  Type 2 diabetes mellitus  3.  Hyperlipidemia  4.  Left foot drop with ankle braces in place  5.  Hypothyroidism    Patient will get PET stress today.  Get medical records from Dr. Colon office.  Diagnosis and plan of care discussed with patient and verbalized understanding.            Your medication list          Accurate as of June 29, 2022  9:45 AM. If you have any questions, ask your nurse or doctor.            CONTINUE taking these medications      Instructions Last Dose Given Next Dose Due   amitriptyline 100 MG tablet  Commonly known as: ELAVIL      Take  by mouth.       atorvastatin 40 MG tablet  Commonly known as: LIPITOR      Take 40 mg by mouth Daily.       Butalbital-APAP-Caffeine -40 MG per capsule      Take  by mouth.       EPINEPHrine 0.3 MG/0.3ML solution auto-injector injection  Commonly known as: EPIPEN      epinephrine 0.3 mg/0.3 mL injection, auto-injector   Per MD Instructions       esomeprazole 40 MG capsule  Commonly known as: nexIUM      Take 40 mg by mouth Every Morning Before Breakfast.       fluticasone 50 MCG/ACT nasal spray  Commonly known as: FLONASE           gabapentin 300 MG capsule  Commonly known as: NEURONTIN      Take  by mouth.       glucose blood test strip       Accu-Chek Nicky Plus test strips   check BS daily       levothyroxine 100 MCG tablet  Commonly known as: SYNTHROID, LEVOTHROID      Take  by mouth.       linagliptin 5 MG tablet tablet  Commonly known as: TRADJENTA      Take 5 mg by mouth Daily.       metFORMIN 1000 MG tablet  Commonly known as: GLUCOPHAGE      Take 1,000 mg by mouth 2 (Two) Times a Day With Meals.       metFORMIN  MG 24 hr tablet  Commonly known as: GLUCOPHAGE-XR           metoclopramide 5 MG tablet  Commonly known as: REGLAN      Take 5 mg by mouth 4 (Four) Times a Day Before Meals & at Bedtime.       montelukast 4 MG pack  Commonly known as: SINGULAIR      Take  by mouth.       OMEGA 3 PO      Take  by mouth.       pramipexole 0.125 MG tablet  Commonly known as: MIRAPEX      Take 0.125 mg by mouth every night.       raNITIdine 150 MG tablet  Commonly known as: ZANTAC      Take 150 mg by mouth 2 (Two) Times a Day.       topiramate 50 MG tablet  Commonly known as: TOPAMAX      Take 50 mg by mouth 2 (Two) Times a Day.       vitamin B-12 500 MCG tablet  Commonly known as: CYANOCOBALAMIN      Take 500 mcg by mouth Daily.       Vitamin D3 50 MCG (2000 UT) tablet      Take  by mouth.                  Kervin Cerda MD  06/29/22  09:45 EDT

## 2022-07-08 ENCOUNTER — HOSPITAL ENCOUNTER (OUTPATIENT)
Dept: CARDIOLOGY | Facility: HOSPITAL | Age: 62
Discharge: HOME | End: 2022-07-08
Admitting: INTERNAL MEDICINE

## 2022-07-08 VITALS
HEART RATE: 60 BPM | HEIGHT: 65 IN | WEIGHT: 159 LBS | BODY MASS INDEX: 26.49 KG/M2 | DIASTOLIC BLOOD PRESSURE: 78 MMHG | SYSTOLIC BLOOD PRESSURE: 116 MMHG

## 2022-07-08 DIAGNOSIS — R06.02 EXERTIONAL SHORTNESS OF BREATH: ICD-10-CM

## 2022-07-08 LAB
AORTIC ARCH: 2.1 CM
ASCENDING AORTA: 3 CM
BH CV ECHO MEAS - ACS: 1.9 CM
BH CV ECHO MEAS - AO MAX PG: 9 MMHG
BH CV ECHO MEAS - AO MEAN PG: 4 MMHG
BH CV ECHO MEAS - AO ROOT DIAM: 3.2 CM
BH CV ECHO MEAS - AO V2 MAX: 153 CM/SEC
BH CV ECHO MEAS - AO V2 VTI: 31 CM
BH CV ECHO MEAS - AVA PLANIMETRY TRACED: 0.6 CM2
BH CV ECHO MEAS - AVA(I,D): 1.6 CM2
BH CV ECHO MEAS - EDV(MOD-SP2): 48 ML
BH CV ECHO MEAS - EDV(MOD-SP4): 59 ML
BH CV ECHO MEAS - EF(MOD-BP): 59 %
BH CV ECHO MEAS - EF_3D-VOL: 59 %
BH CV ECHO MEAS - ESV(MOD-SP2): 21 ML
BH CV ECHO MEAS - ESV(MOD-SP4): 22 ML
BH CV ECHO MEAS - IVSD: 0.8 CM
BH CV ECHO MEAS - LA 3D VOL INDEX: 22
BH CV ECHO MEAS - LV MAX PG: 3 MMHG
BH CV ECHO MEAS - LV MEAN PG: 1 MMHG
BH CV ECHO MEAS - LV V1 MAX: 80 CM/SEC
BH CV ECHO MEAS - LV V1 VTI: 18 CM
BH CV ECHO MEAS - LVIDD: 4.1 CM
BH CV ECHO MEAS - LVIDS: 2.7 CM
BH CV ECHO MEAS - LVOT DIAM: 1.9 CM
BH CV ECHO MEAS - LVPWD: 0.7 CM
BH CV ECHO MEAS - MV A DUR: 111 SEC
BH CV ECHO MEAS - MV A MAX VEL: 53 CM/SEC
BH CV ECHO MEAS - MV DEC TIME: 251 MSEC
BH CV ECHO MEAS - MV E MAX VEL: 68 CM/SEC
BH CV ECHO MEAS - MV E/A: 1.3
BH CV ECHO MEAS - MV MAX PG: 4 MMHG
BH CV ECHO MEAS - MV MEAN PG: 1 MMHG
BH CV ECHO MEAS - MV P1/2T: 42 MSEC
BH CV ECHO MEAS - MV V2 VTI: 25 CM
BH CV ECHO MEAS - MVA(P1/2T): 5.2 CM2
BH CV ECHO MEAS - PA ACC TIME: 143 SEC
BH CV ECHO MEAS - PA V2 MAX: 100 CM/SEC
BH CV ECHO MEAS - PULM A REVS DUR: 156 SEC
BH CV ECHO MEAS - PULM A REVS VEL: 156 CM/SEC
BH CV ECHO MEAS - PULM DIAS VEL: 49 CM/SEC
BH CV ECHO MEAS - PULM S/D: 1
BH CV ECHO MEAS - PULM SYS VEL: 51 CM/SEC
BH CV ECHO MEAS - RAP SYSTOLE: 3 MMHG
BH CV ECHO MEAS - RV MAX PG: 1 MMHG
BH CV ECHO MEAS - RV V1 MAX: 143 CM/SEC
BH CV ECHO MEAS - RV V1 VTI: 20 CM
BH CV ECHO MEAS - RVOT DIAM: 1.6 CM
BH CV ECHO MEAS - RVSP: 25 MMHG
BH CV ECHO MEAS - SUP REN AO DIAM: 2.2 CM
BH CV ECHO MEAS - SV(MOD-SP2): 55 ML
BH CV ECHO MEAS - SV(MOD-SP4): 63 ML
BH CV ECHO MEAS - TAPSE (>1.6): 2.6 CM
BH CV ECHO MEAS - TR MAX PG: 22 MMHG
BH CV ECHO MEAS - TR MAX VEL: 236 CM/SEC
BH CV XLRA - RV BASE: 2.5 CM
BH CV XLRA - RV LENGTH: 4.9 CM
BH CV XLRA - RV MID: 2.5 CM
BH CV XLRA - TDI S': 2.6 CM/SEC
LEFT ATRIUM VOLUME INDEX: 20 ML/M2
LV EF 2D ECHO EST: 59 %
MAXIMAL PREDICTED HEART RATE: 161 BPM
SINUS: 3 CM
STJ: 2.8 CM
STRESS TARGET HR: 137 BPM

## 2022-07-08 PROCEDURE — 93306 TTE W/DOPPLER COMPLETE: CPT

## 2022-07-08 PROCEDURE — 93306 TTE W/DOPPLER COMPLETE: CPT | Performed by: INTERNAL MEDICINE

## 2022-07-11 ENCOUNTER — TELEPHONE (OUTPATIENT)
Dept: CARDIOLOGY | Facility: CLINIC | Age: 62
End: 2022-07-11

## 2022-07-11 NOTE — TELEPHONE ENCOUNTER
Dr. Cerda,     Kaitlin Alvarez notified of results and recommendations; patient verbalized understanding.    Patient states she is still having chest pressure and dyspnea with exertion. She also reports feeling fatigued all the time. Same symptoms she reported at her last office visit with you. Patient is asking if this could be related to the heat/humidity. I recommended that she stay well hydrated.    Do you have any additional recommendations, or would you like her to follow up sooner than 1 year?    Thanks,   Joanne Lomeli RN  [facility] Triage  07/11/22  12:41 EDT

## 2022-07-11 NOTE — TELEPHONE ENCOUNTER
Patient scheduled to see Joanne Lee on 8/25/2022. Patient asked if she needs to start using an inhaler again and I recommended that she follow up with her PCP regarding this.     Joanne Lomeli RN  [facility] Triage  07/11/22  13:41 EDT

## 2022-07-11 NOTE — PROGRESS NOTES
Reviewed coronary calcium score done at Dr. Colon office.  She had a score of 6 on LAD and a score of 6 and circumflex with total coronary calcium score of 12.

## 2022-07-11 NOTE — PROGRESS NOTES
Please notify patient that stress test does not show significant coronary artery disease and her echocardiogram shows a normal heart function and valve function.  No concerning abnormality.  Continue current care with atorvastatin and start taking baby aspirin 81 mg p.o. daily if she has not started taking one  Let me know if she has any questions.  Follow-up in cardiology clinic in 1 year or sooner with any concerning symptoms.

## 2022-07-11 NOTE — TELEPHONE ENCOUNTER
----- Message from Kervin Cerda MD sent at 7/11/2022 12:13 PM EDT -----  Please notify patient that stress test does not show significant coronary artery disease and her echocardiogram shows a normal heart function and valve function.  No concerning abnormality.  Continue current care with atorvastatin and start taking baby aspirin 81 mg p.o. daily if she has not started taking one  Let me know if she has any questions.  Follow-up in cardiology clinic in 1 year or sooner with any concerning symptoms.

## 2022-09-28 ENCOUNTER — TELEPHONE (OUTPATIENT)
Dept: ONCOLOGY | Facility: CLINIC | Age: 62
End: 2022-09-28

## 2022-09-28 NOTE — TELEPHONE ENCOUNTER
Caller: Kaitlin Alvarez    Relationship to patient: Self    Best call back number: [phone]    Type of visit: LAB AND FOLLOW UP    Requested date: ANY AFTERNOON IN November    If rescheduling, when is the original appointment: 12/15    Additional notes: PLEASE CALL ONCE R/S.

## 2022-11-17 ENCOUNTER — OFFICE VISIT (OUTPATIENT)
Dept: ONCOLOGY | Facility: CLINIC | Age: 62
End: 2022-11-17

## 2022-11-17 ENCOUNTER — LAB (OUTPATIENT)
Dept: OTHER | Facility: HOSPITAL | Age: 62
End: 2022-11-17

## 2022-11-17 VITALS
TEMPERATURE: 97.7 F | HEART RATE: 66 BPM | WEIGHT: 157 LBS | SYSTOLIC BLOOD PRESSURE: 115 MMHG | DIASTOLIC BLOOD PRESSURE: 75 MMHG | RESPIRATION RATE: 18 BRPM | OXYGEN SATURATION: 97 % | BODY MASS INDEX: 26.16 KG/M2 | HEIGHT: 65 IN

## 2022-11-17 DIAGNOSIS — D68.52 PROTHROMBIN GENE MUTATION: ICD-10-CM

## 2022-11-17 DIAGNOSIS — E83.110 HEREDITARY HEMOCHROMATOSIS: Primary | ICD-10-CM

## 2022-11-17 DIAGNOSIS — E61.1 IRON DEFICIENCY: ICD-10-CM

## 2022-11-17 DIAGNOSIS — D50.9 IRON DEFICIENCY ANEMIA, UNSPECIFIED IRON DEFICIENCY ANEMIA TYPE: ICD-10-CM

## 2022-11-17 LAB
ALBUMIN SERPL-MCNC: 4.4 G/DL (ref 3.5–5.2)
ALBUMIN/GLOB SERPL: 1.4 G/DL
ALP SERPL-CCNC: 122 U/L (ref 39–117)
ALT SERPL W P-5'-P-CCNC: 28 U/L (ref 1–33)
ANION GAP SERPL CALCULATED.3IONS-SCNC: 8.6 MMOL/L (ref 5–15)
AST SERPL-CCNC: 18 U/L (ref 1–32)
BASOPHILS # BLD AUTO: 0.03 10*3/MM3 (ref 0–0.2)
BASOPHILS NFR BLD AUTO: 0.4 % (ref 0–1.5)
BILIRUB SERPL-MCNC: 0.3 MG/DL (ref 0–1.2)
BUN SERPL-MCNC: 19 MG/DL (ref 8–23)
BUN/CREAT SERPL: 23.2 (ref 7–25)
CALCIUM SPEC-SCNC: 9.8 MG/DL (ref 8.6–10.5)
CHLORIDE SERPL-SCNC: 105 MMOL/L (ref 98–107)
CO2 SERPL-SCNC: 27.4 MMOL/L (ref 22–29)
CREAT SERPL-MCNC: 0.82 MG/DL (ref 0.57–1)
DEPRECATED RDW RBC AUTO: 44.9 FL (ref 37–54)
EGFRCR SERPLBLD CKD-EPI 2021: 82 ML/MIN/1.73
EOSINOPHIL # BLD AUTO: 0.1 10*3/MM3 (ref 0–0.4)
EOSINOPHIL NFR BLD AUTO: 1.3 % (ref 0.3–6.2)
ERYTHROCYTE [DISTWIDTH] IN BLOOD BY AUTOMATED COUNT: 13.2 % (ref 12.3–15.4)
FERRITIN SERPL-MCNC: 21.3 NG/ML (ref 13–150)
GLOBULIN UR ELPH-MCNC: 3.2 GM/DL
GLUCOSE SERPL-MCNC: 87 MG/DL (ref 65–99)
HCT VFR BLD AUTO: 45.6 % (ref 34–46.6)
HGB BLD-MCNC: 14.1 G/DL (ref 12–15.9)
IMM GRANULOCYTES # BLD AUTO: 0.02 10*3/MM3 (ref 0–0.05)
IMM GRANULOCYTES NFR BLD AUTO: 0.3 % (ref 0–0.5)
IRON 24H UR-MRATE: 105 MCG/DL (ref 37–145)
IRON SATN MFR SERPL: 23 % (ref 20–50)
LYMPHOCYTES # BLD AUTO: 2.11 10*3/MM3 (ref 0.7–3.1)
LYMPHOCYTES NFR BLD AUTO: 27.1 % (ref 19.6–45.3)
MCH RBC QN AUTO: 28.5 PG (ref 26.6–33)
MCHC RBC AUTO-ENTMCNC: 30.9 G/DL (ref 31.5–35.7)
MCV RBC AUTO: 92.1 FL (ref 79–97)
MONOCYTES # BLD AUTO: 0.58 10*3/MM3 (ref 0.1–0.9)
MONOCYTES NFR BLD AUTO: 7.4 % (ref 5–12)
NEUTROPHILS NFR BLD AUTO: 4.96 10*3/MM3 (ref 1.7–7)
NEUTROPHILS NFR BLD AUTO: 63.5 % (ref 42.7–76)
NRBC BLD AUTO-RTO: 0 /100 WBC (ref 0–0.2)
PLATELET # BLD AUTO: 244 10*3/MM3 (ref 140–450)
PMV BLD AUTO: 9 FL (ref 6–12)
POTASSIUM SERPL-SCNC: 4 MMOL/L (ref 3.5–5.2)
PROT SERPL-MCNC: 7.6 G/DL (ref 6–8.5)
RBC # BLD AUTO: 4.95 10*6/MM3 (ref 3.77–5.28)
SODIUM SERPL-SCNC: 141 MMOL/L (ref 136–145)
TIBC SERPL-MCNC: 465 MCG/DL (ref 298–536)
TRANSFERRIN SERPL-MCNC: 312 MG/DL (ref 200–360)
WBC NRBC COR # BLD: 7.8 10*3/MM3 (ref 3.4–10.8)

## 2022-11-17 PROCEDURE — 99214 OFFICE O/P EST MOD 30 MIN: CPT | Performed by: INTERNAL MEDICINE

## 2022-11-17 PROCEDURE — 36415 COLL VENOUS BLD VENIPUNCTURE: CPT

## 2022-11-17 PROCEDURE — 80053 COMPREHEN METABOLIC PANEL: CPT | Performed by: INTERNAL MEDICINE

## 2022-11-17 PROCEDURE — 83540 ASSAY OF IRON: CPT | Performed by: INTERNAL MEDICINE

## 2022-11-17 PROCEDURE — 85025 COMPLETE CBC W/AUTO DIFF WBC: CPT | Performed by: INTERNAL MEDICINE

## 2022-11-17 PROCEDURE — 84466 ASSAY OF TRANSFERRIN: CPT | Performed by: INTERNAL MEDICINE

## 2022-11-17 PROCEDURE — 82728 ASSAY OF FERRITIN: CPT | Performed by: INTERNAL MEDICINE

## 2022-11-17 NOTE — PROGRESS NOTES
.     REASON FOR FOLLOWUP:     1. Hereditary hemochromatosis positive for homozygous HFE mutation H63D.  Patient had elevated liver function panel, she was started on phlebotomy on 11/28/2018.   2.  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  Positive family history.              3.                      HISTORY OF PRESENT ILLNESS:  The patient is a 62 y.o. year old female with diabetes, hyperlipidemia, esophageal stricture, and hereditary hemochromatosis who presents for annual reevaluation of hereditary hemochromatosis.     The patient presented today on 11/17/2022 for annual evaluation to discuss lab results and management plan. She reports unintentional weight loss from having a rough year. She states that she has had some family members who have passed away, one being her mother who passed away 08/2022 from a myocardial infarction. She has lost a total of 12 people over the course of a year and half.    Patient denies any black stools or hematochezia. She had an EGD and colonoscopy with polypectomy in 2020. The colonoscopy was done on 07/20/2020, which revealed a small sigmoid colon polyp that was then resected. The pathology evaluation was tubular adenoma. The EGD was done on the same day as colonoscopy, reporting the biopsy from the antrum that reported mild chronic inflammation, negative for H. pylori, negative for intestinal metaplasia.  GE junction biopsy showed chronic inflammation in the gastric mucosa and extensive acute inflammation in the squamous mucosa with focal erosion in the GE junction biopsy sample. No viral or fungal changes upon examination.     Patient reports her diabetes is well controlled.  She has stable weight.  Continue oral vitamin B12.    Patient has no other specific complaints.    Laboratory studies today on 11/17/2022 reported a decreased ferritin level 21.3, and maintains iron saturation at 23% with a good free iron  at 105. She also maintains normal hemoglobin 14.1 g/dL. Patients' ferritin 2021 was 46.1 ng/dL,  and free iron was 107.         Past Medical History:   Diagnosis Date   • Allergic rhinitis    • Anemia    • Depression    • Diabetes mellitus (HCC)    • Disease of thyroid gland    • GERD (gastroesophageal reflux disease)    • Hyperlipidemia    • IBS (irritable bowel syndrome)    • Iron overload    • Malignant neoplasm of cervix (HCC)    • Multiple sclerosis (HCC)    • RLS (restless legs syndrome)    • Seizures (HCC)    • Sensory neuropathy    • Vitamin D deficiency      Past Surgical History:   Procedure Laterality Date   •  SECTION      x2   • CYSTOSCOPY URETERAL BALLOON DILATATION      x4   • ELBOW PROCEDURE     • STOMACH SURGERY         HEMATOLOGIC/ONCOLOGIC HISTORY:  The patient is a 58 y.o. year old female who is here on 11/15/2008 for initial evaluation and treatment plan for newly diagnosed hereditary hemochromatosis.      This patient has a family history of hereditary hemachromatosis and her mother also had factor II/prothrombin gene mutation and history of DVT.  This patient had examination at her primary care physician, Dr. Colon’ office, on 2018. Laboratory study at that time reported abnormal liver function panel including AST 86, ALT 99, alkaline phosphatase 121 with normal total bilirubin 0.4. She had total serum protein 7.7 and albumin 4.4. Her electrolytes were normal. Patient had elevated glucose 231. Her CBC showed mild erythrocytosis with RBC 5.3 million/mcL, upper normal hematocrit of 46.6% and hemoglobin 15.3%. Normal platelets at 87,000. Normal WBC 8400 including neutrophils 4100 and lymphocytes 3300, monocytes 500. Her ferritin level was elevated at 621 ng/mL, iron saturation 35% with free iron 406 and TIBC 302.  Normal B12 level 945, folic acid 11.5.  Hemoglobin A1c 7.0 and vitamin D 25.  Further laboratory study around 2018 reported negative for hepatitis B surface  antigen, hepatitis B surface antibody, HCV antibody, HIV, and normal GGT 51.      Further studies on 10/16/2018 reported positive for homozygous HFE mutation at H63D.      This patient reports she has a family history of hemochromatosis and also prothrombin gene mutation.  Her mother had DVT and was found having factor II/prothrombin gene mutation.  One of her brothers and 1 of her sisters have hereditary hemochromatosis and both of them are high patient for treatment with frequent phlebotomies.  The patient herself has never had thrombophilia previously.    The patient reports she had 2 term pregnancies.  She had menopause about 15 years ago.  She denies bleeding of any kind.  She was diagnosed with diabetes about 1-1/2 years ago.      The patient reports poor vision and intermittent diarrhea.  She has chronic low back pain and joint pain.  She has significant headaches.  No fainting or syncope.  She denies fevers, sweating, chills.      I saw her initially on 11/15/2018.  Since that time she had a 2 phlebotomy and IV normal saline infusion, and she has good tolerance.  She reports no fainting no dizziness.  This patient was also tested positive for heterozygous factor II mutation on 11/15/2018.    Pretreatment iron study reported at ferritin 270 ng/mL, free iron 79 TIBC 386 saturation 20% on 11/28/2018.  Hb 15.0, ,000 and WBC 6500.  Liver panel ALT 50 AST 30 alkaline phosphatase was 118 and the bilirubin 0.3.    Laboratory study on 12/20/2018 reported much improved ferritin 64.3, free iron 71 TIBC 375 and iron saturation 19%.  Hb 13.3, normal WBC and platelets.  Normalized ALT 28, AST 21, alkaline phosphatase is 116 and total bilirubin 0.4.  Patient had a phlebotomy of 500 mL.     She had laboratory studies on 6/6/2019 reporting ferritin 35.4, free iron 67 TIBC 411 and iron saturation 16%.  Her hemoglobin was at baseline 13.3, normal WBC 7700 and platelets 218,000.  Her ferritin is within the target, no  phlebotomy at that time.     Laboratory study on 12/12/2019 reported ferritin 39.4, iron saturation 17%, and hemoglobin 13.8, hematocrit 44.1%, MCV 88.0, MCHC 31.3 platelets 217,000 and WBC 6200.  Her chemistry lab reported normal liver function panel, normal renal function normal electrolytes, marginal glucose 106.    Patient reports in the summer of 2020 had surgical procedure for esophageal stricture.  She was at a point not even able to swallow water.  She lost 30 pounds during the process.  She is better now after the procedure, however has not back to baseline yet.    According to medical records, she had EGD examination with dilatation of the esophageal structure up to 20 cm and also had colonoscopy with polypectomy on 7/21/2020 by Dr. Ball.    Laboratory studies on 12/10/2020 reported hemoglobin 14.1, MCV 89.2, platelets 232,000 WBC 6620.  Iron study showed ferritin 26.3 ng/mL, free iron 71, and TIBC 138 and iron saturation 16%.  Chemistry lab reported unremarkable CMP..    Laboratory study on 12/16/2021 reported ferritin 46.1 ng/mL and iron saturation 27% with  TIBC 402.  She has normal hemoglobin Hb 13.4, platelets 222,000 WBC 5380.  Chemistry lab reported complete normal CMP, including liver function panel.    MEDICATIONS    Current Outpatient Medications:   •  amitriptyline (ELAVIL) 100 MG tablet, Take  by mouth., Disp: , Rfl:   •  atorvastatin (LIPITOR) 40 MG tablet, Take 40 mg by mouth Daily., Disp: , Rfl:   •  Butalbital-APAP-Caffeine -40 MG per capsule, Take  by mouth., Disp: , Rfl:   •  Cholecalciferol (VITAMIN D3) 2000 units tablet, Take  by mouth., Disp: , Rfl:   •  EPINEPHrine (EPIPEN) 0.3 MG/0.3ML solution auto-injector injection, epinephrine 0.3 mg/0.3 mL injection, auto-injector  Per MD Instructions, Disp: , Rfl:   •  esomeprazole (nexIUM) 40 MG capsule, Take 40 mg by mouth Every Morning Before Breakfast., Disp: , Rfl:   •  fluticasone (FLONASE) 50 MCG/ACT nasal spray, , Disp: ,  Rfl:   •  gabapentin (NEURONTIN) 300 MG capsule, Take  by mouth., Disp: , Rfl:   •  glucose blood test strip, Accu-Chek Nicky Plus test strips  check BS daily, Disp: , Rfl:   •  levothyroxine (SYNTHROID, LEVOTHROID) 100 MCG tablet, Take  by mouth., Disp: , Rfl:   •  linagliptin (TRADJENTA) 5 MG tablet tablet, Take 5 mg by mouth Daily., Disp: , Rfl:   •  metFORMIN (GLUCOPHAGE) 1000 MG tablet, Take 1,000 mg by mouth 2 (Two) Times a Day With Meals., Disp: , Rfl:   •  metFORMIN ER (GLUCOPHAGE-XR) 500 MG 24 hr tablet, , Disp: , Rfl:   •  metoclopramide (REGLAN) 5 MG tablet, Take 5 mg by mouth 4 (Four) Times a Day Before Meals & at Bedtime., Disp: , Rfl:   •  montelukast (SINGULAIR) 4 MG pack, Take  by mouth., Disp: , Rfl:   •  Omega-3 Fatty Acids (OMEGA 3 PO), Take  by mouth., Disp: , Rfl:   •  pramipexole (MIRAPEX) 0.125 MG tablet, Take 0.125 mg by mouth every night., Disp: , Rfl:   •  topiramate (TOPAMAX) 50 MG tablet, Take 50 mg by mouth 2 (Two) Times a Day., Disp: , Rfl:   •  vitamin B-12 (CYANOCOBALAMIN) 500 MCG tablet, Take 500 mcg by mouth Daily., Disp: , Rfl:   •  raNITIdine (ZANTAC) 150 MG tablet, Take 150 mg by mouth 2 (Two) Times a Day., Disp: , Rfl:     Current Facility-Administered Medications:   •  nitroglycerin (NITROSTAT) SL tablet 0.4 mg, 0.4 mg, Sublingual, Q5 Min PRN, Shereen Flood MD  •  nitroglycerin (NITROSTAT) SL tablet 0.4 mg, 0.4 mg, Sublingual, Q5 Min PRN, Griffin Wright III, MD  •  sodium chloride 0.9 % flush 10 mL, 10 mL, Intravenous, PRN, Shereen Flood MD  •  sodium chloride 0.9 % flush 10 mL, 10 mL, Intravenous, PRN, Griffin Wright III, MD    ALLERGIES:     Allergies   Allergen Reactions   • Sulfa Antibiotics Dizziness       SOCIAL HISTORY:       Social History     Socioeconomic History   • Marital status: Single     Spouse name: Not on file   • Number of children: 2    • Years of education: High school education    • Highest education level: High school    Social Needs   • Financial resource  "strain: Not on file   • Food insecurity - worry: Not on file   • Food insecurity - inability: Not on file   • Transportation needs - medical: Not on file   • Transportation needs - non-medical: Not on file   Occupational History     Employer: DISABLED, previous had factory job    Tobacco Use   • Smoking status: Never Smoker   Substance and Sexual Activity   • Alcohol use: No   • Drug use: No    • Sexual activity: Not on file         FAMILY HISTORY:  Family History   Problem Relation Age of Onset   • Breast cancer Maternal Aunt    • Diabetes Other    • Heart disease Other    • Heart disease Mother         CABG x3   • Diabetes Mother         Type 2   • Hypertension Mother    • Stroke Mother    • Lung cancer Father    • Liver cancer Brother 60   • Diabetes Brother    · brother has hereditary hemochromatosis, liver cancer because HH post liver transplant, he is undergoing phlebotomy treatment.    · A sister has hemochromatosis undergoing phlebotomy treatment.   · Mother had DVT, found to have prothrombin gene mutation.  She passed away in 2022.             Vitals:    11/17/22 1322   BP: 115/75   Pulse: 66   Resp: 18   Temp: 97.7 °F (36.5 °C)   TempSrc: Temporal   SpO2: 97%   Weight: 71.2 kg (157 lb)   Height: 165.1 cm (65\")   PainSc: 10-Worst pain ever     Current Status 11/17/2022   ECOG score 0          PHYSICAL EXAM:    GENERAL:  Well-developed, well-nourished female in no acute distress.    SKIN:  Warm, dry without rashes, purpura or petechiae.  HEENT:  Normocephalic.  Wearing mask.   LYMPHATICS:  No cervical, supraclavicular adenopathy.  CHEST: Normal respiratory effort.  Lungs clear to auscultation.   CARDIAC: Regular rhythm and normal rate, without murmurs. Normal S1,S2.  ABDOMEN:  Soft, no tender with no organomegaly or masses.  Bowel sounds normal.  EXTREMITIES:  No edema. Left ankle brace.   NEUROLOGICAL:  Grossly intact.    PSYCHIATRIC:  Normal affect and mood.          RECENT LABS:    Lab Results   Component " Value Date    WBC 7.80 11/17/2022    HGB 14.1 11/17/2022    HCT 45.6 11/17/2022    MCV 92.1 11/17/2022     11/17/2022     Lab Results   Component Value Date    NEUTROABS 4.96 11/17/2022     Lab Results   Component Value Date    IRON 107 12/16/2021    TIBC 402 12/16/2021    FERRITIN 46.10 12/16/2021   Iron saturation 27% on 12/16/2021.    Lab Results   Component Value Date    IRON 105 11/17/2022    TIBC 465 11/17/2022    FERRITIN 21.30 11/17/2022   Iron saturation 23% on 11/17/2022     Lab Results   Component Value Date    GLUCOSE 87 11/17/2022    BUN 19 11/17/2022    CREATININE 0.82 11/17/2022    EGFRIFNONA 66 12/16/2021    BCR 23.2 11/17/2022    K 4.0 11/17/2022    CO2 27.4 11/17/2022    CALCIUM 9.8 11/17/2022    ALBUMIN 4.40 11/17/2022    AST 18 11/17/2022    ALT 28 11/17/2022       Assessment & Plan      *.  Hereditary hemochromatosis.  Patient had liver enzyme elevation at the time of diagnosis.  Her ferritin was elevated at up to 600 ng/mL, however was not as high as checked in our labs.    · Nevertheless we started patient on phlebotomy and she had a great response after 2 sessions of phlebotomy in November 2018.  She had third phlebotomy in late December 2018 with a target ferritin of 50.  At that time laboratory study showed ferritin 64 and iron saturation 19%.   · On 6/6/2019, iron studies showed ferritin 35 and iron saturation 16% and normal hemoglobin 13.3.  No phlebotomy at that time.  · On 12/12/2019 ferritin 39.4, iron saturation 17%.  Maintains normal hemoglobin and normalized liver function panel.  She complains of fatigue.  Certainly there is no phlebotomy needed today.  Her iron study actually has evidence of mild iron deficiency now.  I wonder whether her fatigue is related to that.  We will need to pay more attention in the future, may consider LISA but target ferritin to 100 ng/mL to see if that will balance her fatigue and goal of treatment.  · On 12/10/2020, normal CBC, low iron  saturation 16% with ferritin 26.3 ng/mL.  Unremarkable CMP.  No evidence of reaccumulation of iron, this is likely due to her poor oral intake earlier this year due to esophageal stricture.    · On 12/16/2021 patient is ferritin 46.1 and iron saturation 27%, normal liver function panel, and normal hemoglobin.  No need for phlebotomy.    *Iron deficiency.   · Laboratory studies today on 11/17/2022 reported a decreased ferritin level 21.3 ng/mL,, and maintains iron saturation at 23 % with a good free iron at 105. She also maintains normal hemoglobin 14.1 g/dL. Nevertheless, this is not expected to see her ferritin is coming down without phlebotomy, and considering she has HFE gene mutation associated with hemochromatosis. I think this patient needs to be checked for GI bleeding, she reports no melena, hematochezia or any other bleeding. I think that she needs to be checked out for her stool occult blood. And I also encouraged her to have a colonoscopy done also because this is unusual decreasing ferritin without the phlebotomy or bleeding, this is not expected in this patient. We will test the patient for stool occult blood, testing kit will be provided today.      *.  Positive for heterozygous factor II (prothrombin gene) mutation.  She presently had no thrombophilia.  She had positive family history, with her mother having the same gene mutation and history of DVT.   · Previously I asked patient to be mobile to avoid blood staggering in her leg to form clots.  During travel such as on airplane or by automobile, she needs to walk around for a few minutes every 1-2 hours.  She voiced understanding.   · Patient reports no edema in legs, no chest pain or dyspnea.        PLAN:  1. No phlebotomy today.  2. Obtain stool occult blood, testing kit provided to patient today.  3. We encouraged patient to have a repeat colonoscopy done.  4. If the colonoscopy is negative, then we will see her in 1 year with laboratory studies  "CBC CMP, ferritin and iron profile.  Will reevaluate and may consider to increased target of ferritin to 100 ng/mL.   5. She will let us know if anything changes. We can see her anytime for reevaluation     I discussed with the patient about laboratory results and further management plan.  Patient voiced understanding and agreeable.      TAQUERIA ZURITA M.D., Ph.D.        CC:   Gage Colon MD    PatriciaL. MD Quinn      Transcribed from ambient dictation for Taqueria Zurita MD PhD by Yasmin Slaughter.  11/17/22   14:35 EST    [facility] Provider Statement:[id number]::\"Patient or patient representative verbalized consent to the visit recording.\",\"I have personally performed the services described in this document as transcribed by the above individual, and it is both accurate and complete.\"                 "

## 2022-11-26 PROBLEM — E61.1 IRON DEFICIENCY: Status: ACTIVE | Noted: 2022-11-26

## 2022-11-28 LAB
COLLECT DATE SP2 STL: NORMAL
COLLECT DATE SP3 STL: NORMAL
COLLECT DATE STL: NORMAL
GASTROCULT GAST QL: NEGATIVE
HEMOCCULT SP2 STL QL: NEGATIVE
HEMOCCULT SP3 STL QL: NEGATIVE
Lab: 12

## 2022-11-28 PROCEDURE — 82270 OCCULT BLOOD FECES: CPT

## 2022-12-01 ENCOUNTER — TELEPHONE (OUTPATIENT)
Dept: ONCOLOGY | Facility: CLINIC | Age: 62
End: 2022-12-01

## 2022-12-01 NOTE — TELEPHONE ENCOUNTER
Per Dr Greenberg patient called and inform all of her stool samples were negative for blood. Patient also needs to take Ferrous Sulfate 1 tab daily for the next 30 days and then stop.

## 2022-12-01 NOTE — TELEPHONE ENCOUNTER
----- Message from Mairsel Greenberg MD PhD sent at 11/30/2022  8:53 PM EST -----  Regarding: Stool occult blood test  [facility]    Please call patient all of her stool samples were negative for blood.    Thank you !    Dionicio

## 2023-03-18 ENCOUNTER — APPOINTMENT (OUTPATIENT)
Dept: CT IMAGING | Facility: HOSPITAL | Age: 63
End: 2023-03-18
Payer: MEDICARE

## 2023-03-18 ENCOUNTER — APPOINTMENT (OUTPATIENT)
Dept: GENERAL RADIOLOGY | Facility: HOSPITAL | Age: 63
End: 2023-03-18
Payer: MEDICARE

## 2023-03-18 ENCOUNTER — HOSPITAL ENCOUNTER (EMERGENCY)
Facility: HOSPITAL | Age: 63
Discharge: HOME | End: 2023-03-18
Attending: EMERGENCY MEDICINE | Admitting: EMERGENCY MEDICINE
Payer: MEDICARE

## 2023-03-18 VITALS
RESPIRATION RATE: 16 BRPM | TEMPERATURE: 98.7 F | BODY MASS INDEX: 24.16 KG/M2 | OXYGEN SATURATION: 98 % | HEIGHT: 65 IN | DIASTOLIC BLOOD PRESSURE: 77 MMHG | SYSTOLIC BLOOD PRESSURE: 140 MMHG | HEART RATE: 76 BPM | WEIGHT: 145 LBS

## 2023-03-18 DIAGNOSIS — S16.1XXA CERVICAL STRAIN, ACUTE, INITIAL ENCOUNTER: ICD-10-CM
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
## 2023-03-18 DIAGNOSIS — R93.0 ABNORMAL HEAD CT: ICD-10-CM

## 2023-03-18 DIAGNOSIS — G44.319 ACUTE POST-TRAUMATIC HEADACHE, NOT INTRACTABLE: ICD-10-CM

## 2023-03-18 DIAGNOSIS — M54.50 ACUTE BILATERAL LOW BACK PAIN WITHOUT SCIATICA: ICD-10-CM

## 2023-03-18 DIAGNOSIS — M54.9 UPPER BACK PAIN: ICD-10-CM

## 2023-03-18 PROCEDURE — 72110 X-RAY EXAM L-2 SPINE 4/>VWS: CPT

## 2023-03-18 PROCEDURE — 70450 CT HEAD/BRAIN W/O DYE: CPT

## 2023-03-18 PROCEDURE — 72125 CT NECK SPINE W/O DYE: CPT

## 2023-03-18 PROCEDURE — 99283 EMERGENCY DEPT VISIT LOW MDM: CPT

## 2023-03-18 PROCEDURE — 72072 X-RAY EXAM THORAC SPINE 3VWS: CPT

## 2023-03-18 RX ORDER — CYCLOBENZAPRINE HCL 10 MG
10 TABLET ORAL 2 TIMES DAILY PRN
Qty: 10 TABLET | Refills: 0 | Status: SHIPPED | OUTPATIENT
Start: 2023-03-18
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
## 2023-07-18 ENCOUNTER — TELEPHONE (OUTPATIENT)
Dept: ORTHOPEDIC SURGERY | Facility: CLINIC | Age: 63
End: 2023-07-18

## 2023-07-18 NOTE — TELEPHONE ENCOUNTER
Caller: Kaitlin Alvarez    Relationship to patient: Self    Best call back number: [phone]    Additional notes: PATIENT CALLED TO RESCHEDULE 07/31 WITH DR. CHRISTIANSEN. PATIENT STATED REPRESENTATIVE AUGUSTINE FROM [facility] ADVISE HER TO CONTACT OFFICE AND USE PERSONAL INSURANCE WELLC[employer] AND SHE WILL BE REFUNDED BY "[employer] FOR ANY MEDICAL BILLS. FOR ADDITIONAL INFORMATION PLEASE CONTACT AUGUSTINE -[phone] OR PATIENT AT NUMBER ABOVE. HUB UNABLE TO WARM TRANSFER.

## 2023-07-24 ENCOUNTER — TELEPHONE (OUTPATIENT)
Dept: ORTHOPEDIC SURGERY | Facility: CLINIC | Age: 63
End: 2023-07-24

## 2023-07-24 ENCOUNTER — TRANSCRIBE ORDERS (OUTPATIENT)
Dept: ADMINISTRATIVE | Facility: HOSPITAL | Age: 63
End: 2023-07-24
Payer: MEDICARE

## 2023-07-24 DIAGNOSIS — G56.22 LESION OF LEFT ULNAR NERVE: Primary | ICD-10-CM

## 2023-07-24 DIAGNOSIS — G56.90 NEUROPATHY, AXILLARY NERVE: ICD-10-CM

## 2023-07-24 DIAGNOSIS — M25.522 PAIN IN LEFT ELBOW: ICD-10-CM

## 2023-07-24 NOTE — TELEPHONE ENCOUNTER
UNABLE TO WT      Caller: REE YUSUF    Relationship to patient: PATIENT     Best call back number: [phone]    Patient is needing: PATIENT ORIGINALLY SCHEDULED AS NEW PATIENT BY PRACTICE WITHOUT REFERRAL  SCHEDULED  07/30/23 WITH DR CHRISTIANSEN.  PATIENT STATES SHE HAS AN APPOINTMENT FOR EMG ON THIS SAME DAY AND NEEDS TO RESCHEDULE. WILL NEED EMG RESULTS BEFORE COMING IN.

## 2023-08-03 ENCOUNTER — OFFICE VISIT (OUTPATIENT)
Dept: ORTHOPEDIC SURGERY | Facility: CLINIC | Age: 63
End: 2023-08-03
Payer: MEDICARE

## 2023-08-03 VITALS — HEIGHT: 65 IN | TEMPERATURE: 97.5 F | BODY MASS INDEX: 26.17 KG/M2 | WEIGHT: 157.1 LBS

## 2023-08-03 DIAGNOSIS — R52 PAIN: ICD-10-CM

## 2023-08-03 DIAGNOSIS — M79.602 PAIN OF LEFT UPPER EXTREMITY: Primary | ICD-10-CM

## 2023-08-03 PROCEDURE — 99203 OFFICE O/P NEW LOW 30 MIN: CPT | Performed by: ORTHOPAEDIC SURGERY

## 2023-08-03 RX ORDER — GABAPENTIN 800 MG/1
TABLET ORAL
COMMUNITY
Start: 2023-06-14

## 2023-08-03 RX ORDER — MIRABEGRON 50 MG/1
TABLET, FILM COATED, EXTENDED RELEASE ORAL
COMMUNITY

## 2023-08-03 RX ORDER — TRIAMCINOLONE ACETONIDE 1 MG/G
CREAM TOPICAL
COMMUNITY

## 2023-08-03 RX ORDER — PREDNISONE 10 MG/1
TABLET ORAL
COMMUNITY
Start: 2023-05-23

## 2023-08-03 RX ORDER — MONTELUKAST SODIUM 10 MG/1
TABLET ORAL
COMMUNITY

## 2023-08-08 NOTE — PROGRESS NOTES
General Exam    Patient: Kaitlin Alvarez    YOB: 1960    Medical Record Number: [id number]    Chief Complaints: Left elbow arm pain    History of Present Illness:     62 y.o. female patient who presents for evaluation left elbow and arm pain.  Patient that she had a fall March 18 of this past year states ever since then she has had some pain about her elbow also throughout the arm.  Some numbness in the dorsum of her hand.  She states she had a MRI of her neck several days ago.  She was to get a nerve study.    Denies any numbness or tingling.  Denies any fevers, cough or shortness of breath.    Allergies:   Allergies   Allergen Reactions    Sulfa Antibiotics Dizziness       Home Medications:      Current Outpatient Medications:     amitriptyline (ELAVIL) 100 MG tablet, Take  by mouth., Disp: , Rfl:     atorvastatin (LIPITOR) 40 MG tablet, Take 1 tablet by mouth Daily., Disp: , Rfl:     Cholecalciferol (VITAMIN D3) 2000 units tablet, Take  by mouth., Disp: , Rfl:     EPINEPHrine (EPIPEN) 0.3 MG/0.3ML solution auto-injector injection, epinephrine 0.3 mg/0.3 mL injection, auto-injector  Per MD Instructions, Disp: , Rfl:     esomeprazole (nexIUM) 40 MG capsule, Take 1 capsule by mouth Every Morning Before Breakfast., Disp: , Rfl:     fluticasone (FLONASE) 50 MCG/ACT nasal spray, , Disp: , Rfl:     gabapentin (NEURONTIN) 800 MG tablet, , Disp: , Rfl:     glucose blood test strip, Accu-Chek Nicky Plus test strips  check BS daily, Disp: , Rfl:     levothyroxine (SYNTHROID, LEVOTHROID) 100 MCG tablet, Take  by mouth., Disp: , Rfl:     metFORMIN (GLUCOPHAGE) 1000 MG tablet, Take 1 tablet by mouth 2 (Two) Times a Day With Meals., Disp: , Rfl:     montelukast (SINGULAIR) 10 MG tablet, montelukast 10 mg tablet  TAKE 1 TABLET BY MOUTH EVERY DAY, Disp: , Rfl:     Myrbetriq 50 MG tablet sustained-release 24 hour 24 hr tablet, Myrbetriq 50 mg tablet,extended release  TAKE 1 TABLET BY MOUTH EVERY DAY, Disp: , Rfl:      Omega-3 Fatty Acids (OMEGA 3 PO), Take  by mouth., Disp: , Rfl:     pramipexole (MIRAPEX) 0.125 MG tablet, Take 1 tablet by mouth Every Night., Disp: , Rfl:     topiramate (TOPAMAX) 50 MG tablet, Take 1 tablet by mouth 2 (Two) Times a Day., Disp: , Rfl:     triamcinolone (KENALOG) 0.1 % cream, triamcinolone acetonide 0.1 % topical cream, Disp: , Rfl:     vitamin B-12 (CYANOCOBALAMIN) 500 MCG tablet, Take 1 tablet by mouth Daily., Disp: , Rfl:     Butalbital-APAP-Caffeine -40 MG per capsule, Take  by mouth. (Patient not taking: Reported on 8/3/2023), Disp: , Rfl:     cyclobenzaprine (FLEXERIL) 10 MG tablet, Take 1 tablet by mouth 2 (Two) Times a Day As Needed (back/neck pain). (Patient not taking: Reported on 8/3/2023), Disp: 10 tablet, Rfl: 0    gabapentin (NEURONTIN) 300 MG capsule, Take  by mouth. (Patient not taking: Reported on 8/3/2023), Disp: , Rfl:     linaclotide (LINZESS) 145 MCG capsule capsule, 1 capsule Daily. (Patient not taking: Reported on 8/3/2023), Disp: , Rfl:     linagliptin (TRADJENTA) 5 MG tablet tablet, Take 5 mg by mouth Daily. (Patient not taking: Reported on 8/3/2023), Disp: , Rfl:     metFORMIN ER (GLUCOPHAGE-XR) 500 MG 24 hr tablet, , Disp: , Rfl:     metoclopramide (REGLAN) 5 MG tablet, Take 5 mg by mouth 4 (Four) Times a Day Before Meals & at Bedtime. (Patient not taking: Reported on 8/3/2023), Disp: , Rfl:     montelukast (SINGULAIR) 4 MG pack, Take  by mouth. (Patient not taking: Reported on 8/3/2023), Disp: , Rfl:     predniSONE (DELTASONE) 10 MG tablet, , Disp: , Rfl:     Current Facility-Administered Medications:     nitroglycerin (NITROSTAT) SL tablet 0.4 mg, 0.4 mg, Sublingual, Q5 Min PRN, Griffin Wright III, MD    sodium chloride 0.9 % flush 10 mL, 10 mL, Intravenous, PRN, Shereen Flood MD    sodium chloride 0.9 % flush 10 mL, 10 mL, Intravenous, PRN, Griffin Wright III, MD    Past Medical History:   Diagnosis Date    Allergic rhinitis     Anemia     Depression      "Diabetes mellitus     Disease of thyroid gland     GERD (gastroesophageal reflux disease)     Hyperlipidemia     IBS (irritable bowel syndrome)     Iron overload     Malignant neoplasm of cervix     Multiple sclerosis     RLS (restless legs syndrome)     Seizures     Sensory neuropathy     Vitamin D deficiency        Past Surgical History:   Procedure Laterality Date     SECTION      x2    CYSTOSCOPY URETERAL BALLOON DILATATION      x4    ELBOW PROCEDURE      STOMACH SURGERY         Social History     Occupational History     Employer: DISABLED   Tobacco Use    Smoking status: Never    Smokeless tobacco: Never   Vaping Use    Vaping Use: Never used   Substance and Sexual Activity    Alcohol use: No    Drug use: No    Sexual activity: Defer      Social History     Social History Narrative    Not on file       Family History   Problem Relation Age of Onset    Breast cancer Maternal Aunt     Diabetes Other     Heart disease Other     Heart disease Mother         CABG x3    Diabetes Mother         Type 2    Hypertension Mother     Stroke Mother     Lung cancer Father     Liver cancer Brother 60    Diabetes Brother        Review of Systems:      Constitutional: Denies fever, shaking or chills         All other pertinent positives and negatives as noted above in HPI.    Physical Exam: 62 y.o. female    Vitals:    23 1423   Temp: 97.5 øF (36.4 øC)   TempSrc: Temporal   Weight: 71.3 kg (157 lb 1.6 oz)   Height: 165.1 cm (65\")       General:  Patient is awake and alert.  Appears in no acute distress or discomfort.      Musculoskeletal/Extremities:    Left upper extremity examined shoulder and elbow range of motion full and painless.  Motor and sensory tact distally.  No significant findings noted.         Radiology:       AP and lateral films left elbow taken reviewed to evaluate the patient's complaint/s.    No bony or osseous abnormality appreciated.  No acute fracture     No imaging for " comparison.    Assessment: Left elbow pain      Plan:      Discussed the finds the patient told her I do not have a great answer her symptoms are variable and exam findings are benign.  I think she should see what the MRI of the neck shows as this may very well be stemming from the cervical spine.  Also if they continue with the nerve study for further evaluation.  I do not think this is involving her elbow joint at this time.  Follow-up as needed.           We will plan for follow up as above.    All questions were answered.  Patient understands and agrees with the plan.    Derian Skinner MD    08/03/2023    CC to Gage Colon MD

## 2023-11-16 ENCOUNTER — OFFICE VISIT (OUTPATIENT)
Dept: ONCOLOGY | Facility: CLINIC | Age: 63
End: 2023-11-16
Payer: MEDICARE

## 2023-11-16 ENCOUNTER — LAB (OUTPATIENT)
Dept: OTHER | Facility: HOSPITAL | Age: 63
End: 2023-11-16
Payer: MEDICARE

## 2023-11-16 VITALS
OXYGEN SATURATION: 96 % | WEIGHT: 167.2 LBS | DIASTOLIC BLOOD PRESSURE: 77 MMHG | HEIGHT: 65 IN | SYSTOLIC BLOOD PRESSURE: 115 MMHG | TEMPERATURE: 98.2 F | RESPIRATION RATE: 16 BRPM | BODY MASS INDEX: 27.86 KG/M2 | HEART RATE: 73 BPM

## 2023-11-16 DIAGNOSIS — E83.110 HEREDITARY HEMOCHROMATOSIS: Primary | ICD-10-CM

## 2023-11-16 DIAGNOSIS — D68.52 PROTHROMBIN GENE MUTATION: ICD-10-CM

## 2023-11-16 DIAGNOSIS — D50.9 IRON DEFICIENCY ANEMIA, UNSPECIFIED IRON DEFICIENCY ANEMIA TYPE: ICD-10-CM

## 2023-11-16 DIAGNOSIS — E61.1 IRON DEFICIENCY: ICD-10-CM

## 2023-11-16 DIAGNOSIS — E83.110 HEREDITARY HEMOCHROMATOSIS: ICD-10-CM

## 2023-11-16 LAB
ALBUMIN SERPL-MCNC: 4.2 G/DL (ref 3.5–5.2)
ALBUMIN/GLOB SERPL: 1.4 G/DL
ALP SERPL-CCNC: 78 U/L (ref 39–117)
ALT SERPL W P-5'-P-CCNC: 33 U/L (ref 1–33)
ANION GAP SERPL CALCULATED.3IONS-SCNC: 9.1 MMOL/L (ref 5–15)
AST SERPL-CCNC: 24 U/L (ref 1–32)
BASOPHILS # BLD AUTO: 0.03 10*3/MM3 (ref 0–0.2)
BASOPHILS NFR BLD AUTO: 0.4 % (ref 0–1.5)
BILIRUB SERPL-MCNC: 0.6 MG/DL (ref 0–1.2)
BUN SERPL-MCNC: 16 MG/DL (ref 8–23)
BUN/CREAT SERPL: 18 (ref 7–25)
CALCIUM SPEC-SCNC: 9.4 MG/DL (ref 8.6–10.5)
CHLORIDE SERPL-SCNC: 104 MMOL/L (ref 98–107)
CO2 SERPL-SCNC: 24.9 MMOL/L (ref 22–29)
CREAT SERPL-MCNC: 0.89 MG/DL (ref 0.57–1)
DEPRECATED RDW RBC AUTO: 48.1 FL (ref 37–54)
EGFRCR SERPLBLD CKD-EPI 2021: 73.9 ML/MIN/1.73
EOSINOPHIL # BLD AUTO: 0.08 10*3/MM3 (ref 0–0.4)
EOSINOPHIL NFR BLD AUTO: 1.1 % (ref 0.3–6.2)
ERYTHROCYTE [DISTWIDTH] IN BLOOD BY AUTOMATED COUNT: 14.2 % (ref 12.3–15.4)
FERRITIN SERPL-MCNC: 29.5 NG/ML (ref 13–150)
GLOBULIN UR ELPH-MCNC: 2.9 GM/DL
GLUCOSE SERPL-MCNC: 102 MG/DL (ref 65–99)
HCT VFR BLD AUTO: 43.9 % (ref 34–46.6)
HGB BLD-MCNC: 13.5 G/DL (ref 12–15.9)
IMM GRANULOCYTES # BLD AUTO: 0.03 10*3/MM3 (ref 0–0.05)
IMM GRANULOCYTES NFR BLD AUTO: 0.4 % (ref 0–0.5)
IRON 24H UR-MRATE: 227 MCG/DL (ref 37–145)
IRON SATN MFR SERPL: 48 % (ref 20–50)
LYMPHOCYTES # BLD AUTO: 1.66 10*3/MM3 (ref 0.7–3.1)
LYMPHOCYTES NFR BLD AUTO: 22.4 % (ref 19.6–45.3)
MCH RBC QN AUTO: 28.3 PG (ref 26.6–33)
MCHC RBC AUTO-ENTMCNC: 30.8 G/DL (ref 31.5–35.7)
MCV RBC AUTO: 92 FL (ref 79–97)
MONOCYTES # BLD AUTO: 0.58 10*3/MM3 (ref 0.1–0.9)
MONOCYTES NFR BLD AUTO: 7.8 % (ref 5–12)
NEUTROPHILS NFR BLD AUTO: 5.02 10*3/MM3 (ref 1.7–7)
NEUTROPHILS NFR BLD AUTO: 67.9 % (ref 42.7–76)
NRBC BLD AUTO-RTO: 0 /100 WBC (ref 0–0.2)
PLATELET # BLD AUTO: 225 10*3/MM3 (ref 140–450)
PMV BLD AUTO: 8.8 FL (ref 6–12)
POTASSIUM SERPL-SCNC: 4.1 MMOL/L (ref 3.5–5.2)
PROT SERPL-MCNC: 7.1 G/DL (ref 6–8.5)
RBC # BLD AUTO: 4.77 10*6/MM3 (ref 3.77–5.28)
SODIUM SERPL-SCNC: 138 MMOL/L (ref 136–145)
TIBC SERPL-MCNC: 474 MCG/DL (ref 298–536)
TRANSFERRIN SERPL-MCNC: 318 MG/DL (ref 200–360)
WBC NRBC COR # BLD AUTO: 7.4 10*3/MM3 (ref 3.4–10.8)

## 2023-11-16 PROCEDURE — 82728 ASSAY OF FERRITIN: CPT | Performed by: INTERNAL MEDICINE

## 2023-11-16 PROCEDURE — 85025 COMPLETE CBC W/AUTO DIFF WBC: CPT | Performed by: INTERNAL MEDICINE

## 2023-11-16 PROCEDURE — 36415 COLL VENOUS BLD VENIPUNCTURE: CPT

## 2023-11-16 PROCEDURE — 83540 ASSAY OF IRON: CPT | Performed by: INTERNAL MEDICINE

## 2023-11-16 PROCEDURE — 80053 COMPREHEN METABOLIC PANEL: CPT | Performed by: INTERNAL MEDICINE

## 2023-11-16 PROCEDURE — 84466 ASSAY OF TRANSFERRIN: CPT | Performed by: INTERNAL MEDICINE
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
## 2024-04-16 ENCOUNTER — PREP FOR SURGERY (OUTPATIENT)
Dept: OTHER | Facility: HOSPITAL | Age: 64
End: 2024-04-16
Payer: MEDICARE

## 2024-04-16 ENCOUNTER — OFFICE VISIT (OUTPATIENT)
Dept: SURGERY | Facility: CLINIC | Age: 64
End: 2024-04-16
Payer: MEDICARE

## 2024-04-16 VITALS
HEIGHT: 65 IN | DIASTOLIC BLOOD PRESSURE: 88 MMHG | SYSTOLIC BLOOD PRESSURE: 128 MMHG | WEIGHT: 160 LBS | BODY MASS INDEX: 26.66 KG/M2

## 2024-04-16 DIAGNOSIS — Z86.010 HISTORY OF ADENOMATOUS POLYP OF COLON: Primary | ICD-10-CM

## 2024-04-16 DIAGNOSIS — Z86.010 HISTORY OF ADENOMATOUS POLYP OF COLON: ICD-10-CM

## 2024-04-16 DIAGNOSIS — R13.14 PHARYNGOESOPHAGEAL DYSPHAGIA: Primary | ICD-10-CM

## 2024-04-16 DIAGNOSIS — K22.2 ESOPHAGEAL STRICTURE: ICD-10-CM

## 2024-04-16 DIAGNOSIS — R13.14 PHARYNGOESOPHAGEAL DYSPHAGIA: ICD-10-CM

## 2024-04-16 DIAGNOSIS — K22.70 BARRETT'S ESOPHAGUS WITHOUT DYSPLASIA: ICD-10-CM

## 2024-04-16 PROBLEM — Z86.0101 HISTORY OF ADENOMATOUS POLYP OF COLON: Status: ACTIVE | Noted: 2024-04-16

## 2024-04-16 PROCEDURE — 1160F RVW MEDS BY RX/DR IN RCRD: CPT | Performed by: SURGERY

## 2024-04-16 PROCEDURE — 99204 OFFICE O/P NEW MOD 45 MIN: CPT | Performed by: SURGERY

## 2024-04-16 PROCEDURE — 1159F MED LIST DOCD IN RCRD: CPT | Performed by: SURGERY

## 2024-04-16 NOTE — H&P (VIEW-ONLY)
General Surgery Initial Office Visit    Referring Provider: Self Referring    Chief Complaint:    Dysphagia, throat feels like it is closing    History of Present Illness:    Kaitlin Alvarez is a 63 y.o. female with history of esophageal stricture requiring dilation, most recently done in .  She reports about a week ago she had the same feeling of her esophagus closing.  It is difficult for her to eat a lot of solid food but she is drinking plenty of fluids.  She does report having IBS and tends to be constipated.  She denies family history of any colon cancer.    Past Medical History:   Anemia  Jensen's esophagus  Depression  Diabetes  Thyroid disease  Hyperlipidemia  IBS  Hereditary hemochromatosis  Malignant neoplasm of cervix  Multiple sclerosis  Restless leg syndrome  Seizures  Sensory neuropathy  Vitamin D deficiency    Past Surgical History:     x 2  Colonoscopy   Cystoscopy with ureteral dilation  Elbow procedure  Esophageal dilation x 8  Sinus surgery    Family History:    Denies family history of colon cancer  Maternal aunt-breast cancer  Mother-heart disease, diabetes, hypertension, stroke  Father-lung cancer  Brother-liver cancer    Social History:      Single  Denies tobacco use  Occasional alcohol use    Allergies:   Allergies   Allergen Reactions    Bee Pollen Swelling    Bee Venom Swelling    Sulfa Antibiotics Dizziness       Medications:     Current Outpatient Medications:     Aspirin 81 MG capsule, Take 1 capsule every day by oral route., Disp: , Rfl:     atorvastatin (LIPITOR) 40 MG tablet, Take 1 tablet by mouth Daily., Disp: , Rfl:     Cholecalciferol (VITAMIN D3) 2000 units tablet, Take  by mouth., Disp: , Rfl:     EPINEPHrine (EPIPEN) 0.3 MG/0.3ML solution auto-injector injection, , Disp: , Rfl:     esomeprazole (nexIUM) 40 MG capsule, Take 1 capsule by mouth Every Morning Before Breakfast., Disp: , Rfl:     fluticasone (FLONASE) 50 MCG/ACT nasal spray, , Disp: , Rfl:      gabapentin (NEURONTIN) 800 MG tablet, , Disp: , Rfl:     glucose blood test strip, Accu-Chek Nicky Plus test strips  check BS daily, Disp: , Rfl:     levothyroxine (SYNTHROID, LEVOTHROID) 100 MCG tablet, Take  by mouth., Disp: , Rfl:     linagliptin (TRADJENTA) 5 MG tablet tablet, Take 1 tablet by mouth Daily., Disp: , Rfl:     metFORMIN ER (GLUCOPHAGE-XR) 500 MG 24 hr tablet, Take 2 tablets by mouth 2 (Two) Times a Day., Disp: , Rfl:     montelukast (SINGULAIR) 10 MG tablet, montelukast 10 mg tablet  TAKE 1 TABLET BY MOUTH EVERY DAY, Disp: , Rfl:     Myrbetriq 50 MG tablet sustained-release 24 hour 24 hr tablet, Myrbetriq 50 mg tablet,extended release  TAKE 1 TABLET BY MOUTH EVERY DAY, Disp: , Rfl:     pramipexole (MIRAPEX) 0.125 MG tablet, Take 1 tablet by mouth Every Night., Disp: , Rfl:     topiramate (TOPAMAX) 50 MG tablet, Take 1 tablet by mouth 2 (Two) Times a Day., Disp: , Rfl:     vitamin B-12 (CYANOCOBALAMIN) 500 MCG tablet, Take 1 tablet by mouth Daily., Disp: , Rfl:     Current Facility-Administered Medications:     nitroglycerin (NITROSTAT) SL tablet 0.4 mg, 0.4 mg, Sublingual, Q5 Min PRN, Griffin Wright III, MD    sodium chloride 0.9 % flush 10 mL, 10 mL, Intravenous, PRN, Shereen Flood MD    sodium chloride 0.9 % flush 10 mL, 10 mL, Intravenous, PRN, Griffin Wright III, MD    Radiology/Endoscopy:    I personally reviewed the previous EGD and colonoscopy report from 2020 with Dr. Ball.  At that time, she had a distal esophageal stricture that was balloon dilated up to 20 mm.  There is also evidence of gastritis and esophagitis.  There was a single polyp found in the sigmoid colon that was removed.    Labs:    Pathology from 7/2020 endoscopy reviewed  1. Antrum:  Benign gastric mucosa with               A. Mild chronic inflammation.               B. No intestinal metaplasia.               C. No Helicobacter pylori.     2. Gastroesophageal Junction, Biopsy:  Benign squamous and gastric mucosa with                 A. Chronic inflammation of the gastric mucosa.               B. Extensive acute inflammation in the squamous mucosa with focal erosion.               C. No viral changes.               D. No fungal organisms on H/E staining.     Comment: A GMS stain will be performed and reported in an addendum.     3. Colon, Sigmoid, Biopsy:  Tubular adenoma    Review of Systems:   Influenza-like illness: no fever, no  cough, no  sore throat, no  body aches, no loss of sense of taste or smell, no known exposure to person with Covid-19.  Constitutional: Negative for fevers or chills  HENT: Negative for hearing loss or runny nose  Eyes: Negative for vision changes or scleral icterus  Respiratory: Negative for cough or shortness of breath  Cardiovascular: Negative for chest pain or heart palpitations  Gastrointestinal:  Negative for abdominal pain, nausea, vomiting, constipation, melena, or hematochezia  Genitourinary: Negative for hematuria or dysuria  Musculoskeletal: Negative for joint swelling or gait instability  Neurologic: Negative for tremors or seizures  Psychiatric: Negative for suicidal ideations or depression  All other systems reviewed and negative    Physical Exam:   Body mass index is 26.66 kg/m².  Constitutional: Well-developed well-nourished, no acute distress  Eyes: Conjunctiva normal, sclera nonicteric  ENMT: Hearing grossly normal, oral mucosa moist  Neck: Supple, trachea midline  Respiratory: No increased work of breathing, normal inspiratory effort  Cardiovascular: Regular rate, no peripheral edema, no jugular venous distention  Gastrointestinal: Soft, nontender  Skin:  Warm, dry, no rash on visualized skin surfaces  Musculoskeletal: Symmetric strength, normal gait  Psychiatric: Alert and oriented ×3, normal affect          Assessment/Plan:  Dysphagia  History of esophageal stricture  Jensen's esophagus  History of adenomatous colon polyp    I recommended proceeding with EGD with dilation, possible  biopsy and colonoscopy. Indications, risks and procedure were discussed with Her including but not limited to bleeding, infection, possibility of perforation and possible polypectomy. All of their questions were answered and  would like to proceed with the above recommendations.  Any additional follow-up will be discussed with Her after the results have been reviewed.        KIRK CEJA M.D.  General, Robotic, and Endoscopic Surgery  [facility]    [address], [address]  [place], [place], [address]  P: [phone]  F: [phone]

## 2024-04-16 NOTE — PROGRESS NOTES
General Surgery Initial Office Visit    Referring Provider: Self Referring    Chief Complaint:    Dysphagia, throat feels like it is closing    History of Present Illness:    Kaitlin Alvarez is a 63 y.o. female with history of esophageal stricture requiring dilation, most recently done in .  She reports about a week ago she had the same feeling of her esophagus closing.  It is difficult for her to eat a lot of solid food but she is drinking plenty of fluids.  She does report having IBS and tends to be constipated.  She denies family history of any colon cancer.    Past Medical History:   Anemia  Jensen's esophagus  Depression  Diabetes  Thyroid disease  Hyperlipidemia  IBS  Hereditary hemochromatosis  Malignant neoplasm of cervix  Multiple sclerosis  Restless leg syndrome  Seizures  Sensory neuropathy  Vitamin D deficiency    Past Surgical History:     x 2  Colonoscopy   Cystoscopy with ureteral dilation  Elbow procedure  Esophageal dilation x 8  Sinus surgery    Family History:    Denies family history of colon cancer  Maternal aunt-breast cancer  Mother-heart disease, diabetes, hypertension, stroke  Father-lung cancer  Brother-liver cancer    Social History:      Single  Denies tobacco use  Occasional alcohol use    Allergies:   Allergies   Allergen Reactions    Bee Pollen Swelling    Bee Venom Swelling    Sulfa Antibiotics Dizziness       Medications:     Current Outpatient Medications:     Aspirin 81 MG capsule, Take 1 capsule every day by oral route., Disp: , Rfl:     atorvastatin (LIPITOR) 40 MG tablet, Take 1 tablet by mouth Daily., Disp: , Rfl:     Cholecalciferol (VITAMIN D3) 2000 units tablet, Take  by mouth., Disp: , Rfl:     EPINEPHrine (EPIPEN) 0.3 MG/0.3ML solution auto-injector injection, , Disp: , Rfl:     esomeprazole (nexIUM) 40 MG capsule, Take 1 capsule by mouth Every Morning Before Breakfast., Disp: , Rfl:     fluticasone (FLONASE) 50 MCG/ACT nasal spray, , Disp: , Rfl:      gabapentin (NEURONTIN) 800 MG tablet, , Disp: , Rfl:     glucose blood test strip, Accu-Chek Nicky Plus test strips  check BS daily, Disp: , Rfl:     levothyroxine (SYNTHROID, LEVOTHROID) 100 MCG tablet, Take  by mouth., Disp: , Rfl:     linagliptin (TRADJENTA) 5 MG tablet tablet, Take 1 tablet by mouth Daily., Disp: , Rfl:     metFORMIN ER (GLUCOPHAGE-XR) 500 MG 24 hr tablet, Take 2 tablets by mouth 2 (Two) Times a Day., Disp: , Rfl:     montelukast (SINGULAIR) 10 MG tablet, montelukast 10 mg tablet  TAKE 1 TABLET BY MOUTH EVERY DAY, Disp: , Rfl:     Myrbetriq 50 MG tablet sustained-release 24 hour 24 hr tablet, Myrbetriq 50 mg tablet,extended release  TAKE 1 TABLET BY MOUTH EVERY DAY, Disp: , Rfl:     pramipexole (MIRAPEX) 0.125 MG tablet, Take 1 tablet by mouth Every Night., Disp: , Rfl:     topiramate (TOPAMAX) 50 MG tablet, Take 1 tablet by mouth 2 (Two) Times a Day., Disp: , Rfl:     vitamin B-12 (CYANOCOBALAMIN) 500 MCG tablet, Take 1 tablet by mouth Daily., Disp: , Rfl:     Current Facility-Administered Medications:     nitroglycerin (NITROSTAT) SL tablet 0.4 mg, 0.4 mg, Sublingual, Q5 Min PRN, Griffin Wright III, MD    sodium chloride 0.9 % flush 10 mL, 10 mL, Intravenous, PRN, Shereen Flood MD    sodium chloride 0.9 % flush 10 mL, 10 mL, Intravenous, PRN, Griffin Wright III, MD    Radiology/Endoscopy:    I personally reviewed the previous EGD and colonoscopy report from 2020 with Dr. Ball.  At that time, she had a distal esophageal stricture that was balloon dilated up to 20 mm.  There is also evidence of gastritis and esophagitis.  There was a single polyp found in the sigmoid colon that was removed.    Labs:    Pathology from 7/2020 endoscopy reviewed  1. Antrum:  Benign gastric mucosa with               A. Mild chronic inflammation.               B. No intestinal metaplasia.               C. No Helicobacter pylori.     2. Gastroesophageal Junction, Biopsy:  Benign squamous and gastric mucosa with                 A. Chronic inflammation of the gastric mucosa.               B. Extensive acute inflammation in the squamous mucosa with focal erosion.               C. No viral changes.               D. No fungal organisms on H/E staining.     Comment: A GMS stain will be performed and reported in an addendum.     3. Colon, Sigmoid, Biopsy:  Tubular adenoma    Review of Systems:   Influenza-like illness: no fever, no  cough, no  sore throat, no  body aches, no loss of sense of taste or smell, no known exposure to person with Covid-19.  Constitutional: Negative for fevers or chills  HENT: Negative for hearing loss or runny nose  Eyes: Negative for vision changes or scleral icterus  Respiratory: Negative for cough or shortness of breath  Cardiovascular: Negative for chest pain or heart palpitations  Gastrointestinal:  Negative for abdominal pain, nausea, vomiting, constipation, melena, or hematochezia  Genitourinary: Negative for hematuria or dysuria  Musculoskeletal: Negative for joint swelling or gait instability  Neurologic: Negative for tremors or seizures  Psychiatric: Negative for suicidal ideations or depression  All other systems reviewed and negative    Physical Exam:   Body mass index is 26.66 kg/m².  Constitutional: Well-developed well-nourished, no acute distress  Eyes: Conjunctiva normal, sclera nonicteric  ENMT: Hearing grossly normal, oral mucosa moist  Neck: Supple, trachea midline  Respiratory: No increased work of breathing, normal inspiratory effort  Cardiovascular: Regular rate, no peripheral edema, no jugular venous distention  Gastrointestinal: Soft, nontender  Skin:  Warm, dry, no rash on visualized skin surfaces  Musculoskeletal: Symmetric strength, normal gait  Psychiatric: Alert and oriented ×3, normal affect          Assessment/Plan:  Dysphagia  History of esophageal stricture  Jensen's esophagus  History of adenomatous colon polyp    I recommended proceeding with EGD with dilation, possible  biopsy and colonoscopy. Indications, risks and procedure were discussed with Her including but not limited to bleeding, infection, possibility of perforation and possible polypectomy. All of their questions were answered and  would like to proceed with the above recommendations.  Any additional follow-up will be discussed with Her after the results have been reviewed.        KIRK CEJA M.D.  General, Robotic, and Endoscopic Surgery  [facility]    [address], [address]  [place], [place], [address]  P: [phone]  F: [phone]

## 2024-04-18 ENCOUNTER — ANESTHESIA (OUTPATIENT)
Dept: GASTROENTEROLOGY | Facility: HOSPITAL | Age: 64
End: 2024-04-18
Payer: MEDICARE

## 2024-04-18 ENCOUNTER — ANESTHESIA EVENT (OUTPATIENT)
Dept: GASTROENTEROLOGY | Facility: HOSPITAL | Age: 64
End: 2024-04-18
Payer: MEDICARE

## 2024-04-18 ENCOUNTER — HOSPITAL ENCOUNTER (OUTPATIENT)
Facility: HOSPITAL | Age: 64
Setting detail: HOSPITAL OUTPATIENT SURGERY
Discharge: HOME | End: 2024-04-18
Attending: SURGERY | Admitting: SURGERY
Payer: MEDICARE

## 2024-04-18 VITALS
WEIGHT: 168.1 LBS | HEIGHT: 65 IN | DIASTOLIC BLOOD PRESSURE: 82 MMHG | OXYGEN SATURATION: 98 % | HEART RATE: 72 BPM | RESPIRATION RATE: 17 BRPM | BODY MASS INDEX: 28.01 KG/M2 | TEMPERATURE: 98.1 F | SYSTOLIC BLOOD PRESSURE: 143 MMHG

## 2024-04-18 DIAGNOSIS — R13.14 PHARYNGOESOPHAGEAL DYSPHAGIA: ICD-10-CM

## 2024-04-18 DIAGNOSIS — Z86.010 HISTORY OF ADENOMATOUS POLYP OF COLON: ICD-10-CM

## 2024-04-18 DIAGNOSIS — K22.70 BARRETT'S ESOPHAGUS WITHOUT DYSPLASIA: ICD-10-CM

## 2024-04-18 LAB — GLUCOSE BLDC GLUCOMTR-MCNC: 106 MG/DL (ref 70–130)

## 2024-04-18 PROCEDURE — 25010000002 GLYCOPYRROLATE 0.2 MG/ML SOLUTION: Performed by: NURSE ANESTHETIST, CERTIFIED REGISTERED

## 2024-04-18 PROCEDURE — 43239 EGD BIOPSY SINGLE/MULTIPLE: CPT | Performed by: SURGERY

## 2024-04-18 PROCEDURE — C1726 CATH, BAL DIL, NON-VASCULAR: HCPCS | Performed by: SURGERY

## 2024-04-18 PROCEDURE — 82948 REAGENT STRIP/BLOOD GLUCOSE: CPT

## 2024-04-18 PROCEDURE — 25010000002 PROPOFOL 200 MG/20ML EMULSION: Performed by: NURSE ANESTHETIST, CERTIFIED REGISTERED

## 2024-04-18 PROCEDURE — 43248 EGD GUIDE WIRE INSERTION: CPT | Performed by: SURGERY

## 2024-04-18 PROCEDURE — 25810000003 LACTATED RINGERS PER 1000 ML: Performed by: NURSE ANESTHETIST, CERTIFIED REGISTERED

## 2024-04-18 PROCEDURE — 25810000003 LACTATED RINGERS PER 1000 ML: Performed by: SURGERY

## 2024-04-18 PROCEDURE — 45380 COLONOSCOPY AND BIOPSY: CPT | Performed by: SURGERY

## 2024-04-18 PROCEDURE — 88305 TISSUE EXAM BY PATHOLOGIST: CPT | Performed by: SURGERY

## 2024-04-18 RX ORDER — LIDOCAINE HYDROCHLORIDE 10 MG/ML
0.5 INJECTION, SOLUTION INFILTRATION; PERINEURAL ONCE AS NEEDED
Status: DISCONTINUED | OUTPATIENT
Start: 2024-04-18 | End: 2024-04-18 | Stop reason: HOSPADM

## 2024-04-18 RX ORDER — SODIUM CHLORIDE, SODIUM LACTATE, POTASSIUM CHLORIDE, CALCIUM CHLORIDE 600; 310; 30; 20 MG/100ML; MG/100ML; MG/100ML; MG/100ML
1000 INJECTION, SOLUTION INTRAVENOUS CONTINUOUS
Status: DISCONTINUED | OUTPATIENT
Start: 2024-04-18 | End: 2024-04-18 | Stop reason: HOSPADM

## 2024-04-18 RX ORDER — DROPERIDOL 2.5 MG/ML
0.62 INJECTION, SOLUTION INTRAMUSCULAR; INTRAVENOUS
Status: DISCONTINUED | OUTPATIENT
Start: 2024-04-18 | End: 2024-04-18 | Stop reason: HOSPADM

## 2024-04-18 RX ORDER — SODIUM CHLORIDE 0.9 % (FLUSH) 0.9 %
10 SYRINGE (ML) INJECTION AS NEEDED
Status: DISCONTINUED | OUTPATIENT
Start: 2024-04-18 | End: 2024-04-18 | Stop reason: HOSPADM

## 2024-04-18 RX ORDER — PROMETHAZINE HYDROCHLORIDE 25 MG/1
25 SUPPOSITORY RECTAL ONCE AS NEEDED
Status: DISCONTINUED | OUTPATIENT
Start: 2024-04-18 | End: 2024-04-18 | Stop reason: HOSPADM

## 2024-04-18 RX ORDER — SODIUM CHLORIDE, SODIUM LACTATE, POTASSIUM CHLORIDE, CALCIUM CHLORIDE 600; 310; 30; 20 MG/100ML; MG/100ML; MG/100ML; MG/100ML
INJECTION, SOLUTION INTRAVENOUS CONTINUOUS PRN
Status: DISCONTINUED | OUTPATIENT
Start: 2024-04-18 | End: 2024-04-18 | Stop reason: SURG

## 2024-04-18 RX ORDER — GLYCOPYRROLATE 0.2 MG/ML
INJECTION INTRAMUSCULAR; INTRAVENOUS AS NEEDED
Status: DISCONTINUED | OUTPATIENT
Start: 2024-04-18 | End: 2024-04-18 | Stop reason: SURG

## 2024-04-18 RX ORDER — PROPOFOL 10 MG/ML
INJECTION, EMULSION INTRAVENOUS CONTINUOUS PRN
Status: DISCONTINUED | OUTPATIENT
Start: 2024-04-18 | End: 2024-04-18 | Stop reason: SURG

## 2024-04-18 RX ORDER — DIPHENHYDRAMINE HYDROCHLORIDE 50 MG/ML
12.5 INJECTION INTRAMUSCULAR; INTRAVENOUS
Status: DISCONTINUED | OUTPATIENT
Start: 2024-04-18 | End: 2024-04-18 | Stop reason: HOSPADM

## 2024-04-18 RX ORDER — LIDOCAINE HYDROCHLORIDE 20 MG/ML
INJECTION, SOLUTION INFILTRATION; PERINEURAL AS NEEDED
Status: DISCONTINUED | OUTPATIENT
Start: 2024-04-18 | End: 2024-04-18 | Stop reason: SURG

## 2024-04-18 RX ORDER — PROMETHAZINE HYDROCHLORIDE 25 MG/1
25 TABLET ORAL ONCE AS NEEDED
Status: DISCONTINUED | OUTPATIENT
Start: 2024-04-18 | End: 2024-04-18 | Stop reason: HOSPADM

## 2024-04-18 RX ORDER — ONDANSETRON 2 MG/ML
4 INJECTION INTRAMUSCULAR; INTRAVENOUS ONCE AS NEEDED
Status: DISCONTINUED | OUTPATIENT
Start: 2024-04-18 | End: 2024-04-18 | Stop reason: HOSPADM

## 2024-04-18 RX ADMIN — GLYCOPYRROLATE 0.1 MG: 0.2 INJECTION INTRAMUSCULAR; INTRAVENOUS at 12:35

## 2024-04-18 RX ADMIN — PROPOFOL 80 MG: 10 INJECTION, EMULSION INTRAVENOUS at 12:36

## 2024-04-18 RX ADMIN — PROPOFOL 180 MCG/KG/MIN: 10 INJECTION, EMULSION INTRAVENOUS at 12:35

## 2024-04-18 RX ADMIN — SODIUM CHLORIDE, POTASSIUM CHLORIDE, SODIUM LACTATE AND CALCIUM CHLORIDE 1000 ML: 600; 310; 30; 20 INJECTION, SOLUTION INTRAVENOUS at 12:21

## 2024-04-18 RX ADMIN — PROPOFOL 20 MG: 10 INJECTION, EMULSION INTRAVENOUS at 12:39

## 2024-04-18 RX ADMIN — PROPOFOL 10 MG: 10 INJECTION, EMULSION INTRAVENOUS at 12:51

## 2024-04-18 RX ADMIN — LIDOCAINE HYDROCHLORIDE 100 MG: 20 INJECTION, SOLUTION INFILTRATION; PERINEURAL at 12:35

## 2024-04-18 RX ADMIN — SODIUM CHLORIDE, POTASSIUM CHLORIDE, SODIUM LACTATE AND CALCIUM CHLORIDE: 600; 310; 30; 20 INJECTION, SOLUTION INTRAVENOUS at 12:32

## 2024-04-18 NOTE — DISCHARGE INSTRUCTIONS
For the next 24 hours patient needs to be with a responsible adult.    For 24 hours DO NOT drive, operate machinery, appliances, drink alcohol, make important decisions or sign legal documents.    Start with a light or bland diet if you are feeling sick to your stomach otherwise advance to regular diet as tolerated.    Follow recommendations on procedure report if provided by your doctor.    Call Dr Ortega for problems [phone]    Problems may include but not limited to: large amounts of bleeding, trouble breathing, repeated vomiting, severe unrelieved pain, fever or chills.

## 2024-04-18 NOTE — OP NOTE
Operative Note :  Taryn Ortega MD      Kaitlin Alvarez  1960    Procedure Date: 04/18/24    Pre-op Diagnosis:  Dysphagia  History of esophageal stricture  History of adenomatous colon polyp    Post-Operative Diagnosis:  Minimal lower esophageal narrowing, otherwise normal EGD  Splenic flexure polyp  Poor bowel prep    Procedure:   Esophagogastroduodenoscopy with biopsy and dilation  Flexible colonoscopy to cecum with biopsy polypectomy    Surgeon: Taryn Ortega MD    Anesthesia:  MAC (monitored anesthetic care)    Estimated Blood Loss: Minimal    Specimens:   Antral biopsy  Transverse colon (splenic flexure) polyp    Complications: None    Findings:   EGD: normal appearing esophagus, stomach and duodenum. Lower esophagus balloon dilated to 20 mm  Colonoscopy: poor bowel prep. 4mm sessile polyp in the splenic flexure    Description of procedure:  The patient was brought to the endoscopy suite and jay jay in the left lateral decubitus position.  A bite-block was inserted into the oropharynx.  Continuous propofol anesthesia was administered.  A surgical timeout was completed.  An upper endoscope was passed through the bite-block to the posterior oropharynx where the vocal cords and epiglottis were grossly normal.  The cervical esophagus was cannulated and the scope passed onward through the full length of the esophagus, into the stomach and advanced through the pylorus without difficulty.  The scope was advanced to the second portion of the duodenum. The mucosa appeared normal.  The scope was withdrawn into the stomach, where normal appearing mucosa was noted. Antral biopsies were obtained. The scope was retroflexed, noting normal anatomy.  The scope was then withdrawn through the GE junction, noting normal z line.  The lower esophagus was sequentially dilated to 20 mm.  The scope was slowly withdrawn through the esophagus, carefully examining the mucosa.  No mucosal abnormalities were noted.  The scope was  removed from the patient's mouth. The patient tolerated the procedure well.      The patient was then repositioned for colonoscopy.  A digital rectal exam was performed, revealing no abnormalities.  An adult colonoscope was then inserted through the anus and passed under direct visualization of the lumen to the level of the cecum.  This was done with great difficulty and copious irrigation due to poor prep with liquid brown stool coating the walls and filling the lumen. The cecum was identified via the ileocecal valve as well as the appendiceal orifice.  The scope was then slowly withdrawn, examining all circumferential walls of the ascending, transverse, descending, and sigmoid colon. There was a 4 mm sessile poly in the splenic flexure. It was removed with biopsy forceps. The scope was then withdrawn and the colon desufflated.  The bowel prep was poor. The patient was then transferred to the recovery area in stable condition.     Recommendations:  Repeat colonoscopy in 3 years due to poor prep.  I will call with results of biopsies in 3-5 business days.    Taryn Ortega MD  General, Robotic, and Endoscopic Surgery  [facility]    [address], [address]  [place] [address]  P: [phone]  F: [phone]

## 2024-04-18 NOTE — ANESTHESIA POSTPROCEDURE EVALUATION
Patient: Kaitlin Alvarez    Procedure Summary       Date: 04/18/24 Room / Location:  [facility] /  [facility]    Anesthesia Start: 1232 Anesthesia Stop: 1327    Procedures:       COLONOSCOPY into cecum with cold bx polypectomy      ESOPHAGOGASTRODUODENOSCOPY WITH BIOPSY, 18mm,19mm and 20mm balloon dilation (Esophagus) Diagnosis:       Pharyngoesophageal dysphagia      Jensen's esophagus without dysplasia      History of adenomatous polyp of colon      (Pharyngoesophageal dysphagia [R13.14])      (Jensen's esophagus without dysplasia [K22.70])      (History of adenomatous polyp of colon [Z86.010])    Surgeons: Taryn Ortega MD Provider: Spike Tirado MD    Anesthesia Type: MAC ASA Status: 3            Anesthesia Type: MAC    Vitals  Vitals Value Taken Time   /73 04/18/24 1335   Temp     Pulse 73 04/18/24 1342   Resp 16 04/18/24 1335   SpO2 97 % 04/18/24 1342   Vitals shown include unfiled device data.        Post Anesthesia Care and Evaluation    Patient location during evaluation: PACU  Patient participation: complete - patient participated  Level of consciousness: awake and alert  Pain management: adequate    Airway patency: patent  Anesthetic complications: No anesthetic complications    Cardiovascular status: acceptable  Respiratory status: acceptable  Hydration status: acceptable    Comments: --------------------            04/18/24               1343     --------------------   BP:       143/82     Pulse:      72       Resp:       17       Temp:                SpO2:      98%      --------------------

## 2024-04-19 LAB
LAB AP CASE REPORT: NORMAL
PATH REPORT.FINAL DX SPEC: NORMAL
PATH REPORT.GROSS SPEC: NORMAL

## 2024-04-22 ENCOUNTER — TELEPHONE (OUTPATIENT)
Dept: SURGERY | Facility: CLINIC | Age: 64
End: 2024-04-22
Payer: MEDICARE

## 2024-04-22 NOTE — TELEPHONE ENCOUNTER
"Magdalena Jalloh is a 40 y.o. female. Pt is here for a physical exam and to discuss labs from a recent labs done for life insurance. Pt had elevated liver enzymes and \"drank a few beers\" the wkd before the labs and was breastfeeding and had protien in her urine and pt wanted to discuss this. Pt is using condoms and LMP prior to pregnancy.    Seen 09/28/2017    Assessment/Plan   Problem List Items Addressed This Visit     None      Visit Diagnoses     Need for vaccination    -  Primary    Relevant Orders    Flu Vaccine Quad PF 3YR+ (Completed)    Routine health maintenance        Relevant Orders    Comprehensive metabolic panel    POCT pregnancy, urine (Completed)             Return for Annual.  Patient Instructions   Preventive Care 18-39 Years, Female  Preventive care refers to lifestyle choices and visits with your health care provider that can promote health and wellness.  WHAT DOES PREVENTIVE CARE INCLUDE?  · A yearly physical exam. This is also called an annual well check.  · Dental exams once or twice a year.  · Routine eye exams. Ask your health care provider how often you should have your eyes checked.  · Personal lifestyle choices, including:    Daily care of your teeth and gums.    Regular physical activity.    Eating a healthy diet.    Avoiding tobacco and drug use.    Limiting alcohol use.    Practicing safe sex.    Taking vitamin and mineral supplements as recommended by your health care provider.  WHAT HAPPENS DURING AN ANNUAL WELL CHECK?  The services and screenings done by your health care provider during your annual well check will depend on your age, overall health, lifestyle risk factors, and family history of disease.  Counseling  Your health care provider may ask you questions about your:  · Alcohol use.  · Tobacco use.  · Drug use.  · Emotional well-being.  · Home and relationship well-being.  · Sexual activity.  · Eating habits.  · Work and work environment.  · Method of birth " I called and spoke with Kaitlin Alvarez regarding her colonoscopy results.     Pathology:   1.  Stomach, antrum, biopsy: Benign gastric mucosa with               -A. Mild chronic inflammation.                -B. No intestinal metaplasia.               -C. No Helicobacter pylori.                -D.  Features suggesting developing chemical gastritis     2.  Colon, transverse, biopsy: Tubular adenoma    I recommend that she undergoes colonoscopy in 3 years for surveillance/screening due to poor prep.     She verbalized understanding and agreed.    Taryn Ortega MD  General, Robotic and Endoscopic Surgery  [facility]     [address] [place] [address], [address]  [place], [place], [address]  P: [phone]  F: [phone]    control.  · Menstrual cycle.  · Pregnancy history.  Screening  You may have the following tests or measurements:  · Height, weight, and BMI.  · Diabetes screening. This is done by checking your blood sugar (glucose) after you have not eaten for a while (fasting).  · Blood pressure.  · Lipid and cholesterol levels. These may be checked every 5 years starting at age 20.  · Skin check.  · Hepatitis C blood test.  · Hepatitis B blood test.  · Sexually transmitted disease (STD) testing.  · BRCA-related cancer screening. This may be done if you have a family history of breast, ovarian, tubal, or peritoneal cancers.  · Pelvic exam and Pap test. This may be done every 3 years starting at age 21. Starting at age 30, this may be done every 5 years if you have a Pap test in combination with an HPV test.  Discuss your test results, treatment options, and if necessary, the need for more tests with your health care provider.  Vaccines  Your health care provider may recommend certain vaccines, such as:  · Influenza vaccine. This is recommended every year.  · Tetanus, diphtheria, and acellular pertussis (Tdap, Td) vaccine. You may need a Td booster every 10 years.  · Varicella vaccine. You may need this if you have not been vaccinated.  · HPV vaccine. If you are 26 or younger, you may need three doses over 6 months.  · Measles, mumps, and rubella (MMR) vaccine. You may need at least one dose of MMR. You may also need a second dose.  · Pneumococcal 13-valent conjugate (PCV13) vaccine. You may need this if you have certain conditions and were not previously vaccinated.  · Pneumococcal polysaccharide (PPSV23) vaccine. You may need one or two doses if you smoke cigarettes or if you have certain conditions.  · Meningococcal vaccine. One dose is recommended if you are age 19-21 years and a first-year college student living in a residence [place], or if you have one of several medical conditions. You may also need additional booster  doses.  · Hepatitis A vaccine. You may need this if you have certain conditions or if you travel or work in places where you may be exposed to hepatitis A.  · Hepatitis B vaccine. You may need this if you have certain conditions or if you travel or work in places where you may be exposed to hepatitis B.  · Haemophilus influenzae type b (Hib) vaccine. You may need this if you have certain risk factors.  Talk to your health care provider about which screenings and vaccines you need and how often you need them.     This information is not intended to replace advice given to you by your health care provider. Make sure you discuss any questions you have with your health care provider.     Document Released: 02/13/2003 Document Revised: 04/10/2017 Document Reviewed: 10/18/2016  [employer] Interactive Patient Education ©2017 [employer]        Subjective     Chief Complaint   Patient presents with   • Annual Exam     discuss lab results    • Establish Care     Social History   Substance Use Topics   • Smoking status: Never Smoker   • Smokeless tobacco: Never Used   • Alcohol use None       History of Present Illness     The following portions of the patient's history were reviewed and updated as appropriate:PMHroutine: Social history , Allergies, Current Medications, Active Problem List and Health Maintenance    Review of Systems   Constitutional: Negative for activity change, appetite change, chills, fatigue, fever and unexpected weight change.   HENT: Positive for sore throat. Negative for congestion, ear pain, hearing loss, nosebleeds and rhinorrhea.    Eyes: Negative for pain, redness and visual disturbance.   Respiratory: Negative for cough, shortness of breath and wheezing.    Cardiovascular: Negative for chest pain, palpitations and leg swelling.   Gastrointestinal: Negative for abdominal pain, blood in stool, constipation, diarrhea, nausea and vomiting.   Endocrine: Negative for cold intolerance and heat  "intolerance.   Genitourinary: Negative for difficulty urinating, dysuria, frequency, hematuria, pelvic pain, urgency and vaginal discharge.   Musculoskeletal: Positive for back pain. Negative for arthralgias and joint swelling.   Skin: Negative for rash and wound.   Neurological: Negative for dizziness, weakness, numbness and headaches.   Hematological: Does not bruise/bleed easily.   Psychiatric/Behavioral: Negative for dysphoric mood, sleep disturbance and suicidal ideas. The patient is not nervous/anxious.        Objective   Vitals:    09/28/17 0833   BP: 114/70   Pulse: 75   SpO2: 99%   Weight: 171 lb (77.6 kg)   Height: 65\" (165.1 cm)     Body mass index is 28.46 kg/(m^2).  Physical Exam   Constitutional: She is oriented to person, place, and time. Vital signs are normal. She appears well-developed and well-nourished.   HENT:   Head: Normocephalic and atraumatic.   Right Ear: External ear normal.   Left Ear: External ear normal.   Nose: Nose normal.   Mouth/Throat: Oropharynx is clear and moist.   Eyes: Conjunctivae and EOM are normal. Pupils are equal, round, and reactive to light.   Neck: Normal range of motion. Neck supple.   Cardiovascular: Normal rate, regular rhythm, normal heart sounds and intact distal pulses.    Pulmonary/Chest: Effort normal and breath sounds normal.   Abdominal: Soft. Normal appearance and bowel sounds are normal.   Musculoskeletal: Normal range of motion.   Neurological: She is alert and oriented to person, place, and time. She has normal reflexes.   Skin: Skin is warm and dry.   Psychiatric: She has a normal mood and affect. Her behavior is normal. Judgment and thought content normal.   Nursing note and vitals reviewed.    Reviewed Data:  Office Visit on 09/28/2017   Component Date Value Ref Range Status   • HCG, Urine, QL 09/28/2017 Negative  Negative Final   • Lot Number 09/28/2017 [id number]   Final   • Internal Positive Control 09/28/2017 Positive   Final   • Internal Negative " Control 09/28/2017 Negative   Final

## 2024-09-19 ENCOUNTER — TELEPHONE (OUTPATIENT)
Age: 64
End: 2024-09-19
Payer: MEDICARE

## 2024-09-23 ENCOUNTER — TELEPHONE (OUTPATIENT)
Age: 64
End: 2024-09-23
Payer: MEDICARE

## 2024-09-23 DIAGNOSIS — R52 ACUTE PAIN: Primary | ICD-10-CM

## 2024-09-23 RX ORDER — NAPROXEN 500 MG/1
500 TABLET ORAL 2 TIMES DAILY WITH MEALS
Qty: 20 TABLET | Refills: 0 | Status: SHIPPED | OUTPATIENT
Start: 2024-09-23

## 2024-09-26 ENCOUNTER — TELEPHONE (OUTPATIENT)
Age: 64
End: 2024-09-26

## 2024-09-26 ENCOUNTER — OFFICE VISIT (OUTPATIENT)
Age: 64
End: 2024-09-26
Payer: MEDICARE

## 2024-09-26 VITALS
RESPIRATION RATE: 14 BRPM | OXYGEN SATURATION: 97 % | HEART RATE: 91 BPM | SYSTOLIC BLOOD PRESSURE: 110 MMHG | HEIGHT: 65 IN | WEIGHT: 168 LBS | DIASTOLIC BLOOD PRESSURE: 72 MMHG | TEMPERATURE: 97.2 F | BODY MASS INDEX: 27.99 KG/M2

## 2024-09-26 DIAGNOSIS — M79.662 PAIN OF LEFT CALF: Primary | ICD-10-CM

## 2024-09-26 DIAGNOSIS — T63.463S: ICD-10-CM

## 2024-09-26 PROCEDURE — 99204 OFFICE O/P NEW MOD 45 MIN: CPT | Performed by: FAMILY MEDICINE

## 2024-09-26 PROCEDURE — 1160F RVW MEDS BY RX/DR IN RCRD: CPT | Performed by: FAMILY MEDICINE

## 2024-09-26 PROCEDURE — 1159F MED LIST DOCD IN RCRD: CPT | Performed by: FAMILY MEDICINE

## 2024-09-26 PROCEDURE — 1125F AMNT PAIN NOTED PAIN PRSNT: CPT | Performed by: FAMILY MEDICINE

## 2024-09-26 RX ORDER — PREDNISONE 50 MG/1
50 TABLET ORAL DAILY
Qty: 5 TABLET | Refills: 0 | Status: SHIPPED | OUTPATIENT
Start: 2024-09-26

## 2024-09-27 ENCOUNTER — HOSPITAL ENCOUNTER (OUTPATIENT)
Dept: CARDIOLOGY | Facility: HOSPITAL | Age: 64
Discharge: HOME | End: 2024-09-27
Payer: MEDICARE

## 2024-09-27 ENCOUNTER — TELEPHONE (OUTPATIENT)
Age: 64
End: 2024-09-27
Payer: MEDICARE

## 2024-09-27 LAB
BH CV LOWER VASCULAR LEFT COMMON FEMORAL AUGMENT: NORMAL
BH CV LOWER VASCULAR LEFT COMMON FEMORAL COMPETENT: NORMAL
BH CV LOWER VASCULAR LEFT COMMON FEMORAL COMPRESS: NORMAL
BH CV LOWER VASCULAR LEFT COMMON FEMORAL PHASIC: NORMAL
BH CV LOWER VASCULAR LEFT COMMON FEMORAL SPONT: NORMAL
BH CV LOWER VASCULAR LEFT DISTAL FEMORAL COMPRESS: NORMAL
BH CV LOWER VASCULAR LEFT GASTRONEMIUS COMPRESS: NORMAL
BH CV LOWER VASCULAR LEFT GREATER SAPH AK COMPRESS: NORMAL
BH CV LOWER VASCULAR LEFT GREATER SAPH BK COMPRESS: NORMAL
BH CV LOWER VASCULAR LEFT LESSER SAPH COMPRESS: NORMAL
BH CV LOWER VASCULAR LEFT MID FEMORAL AUGMENT: NORMAL
BH CV LOWER VASCULAR LEFT MID FEMORAL COMPETENT: NORMAL
BH CV LOWER VASCULAR LEFT MID FEMORAL COMPRESS: NORMAL
BH CV LOWER VASCULAR LEFT MID FEMORAL PHASIC: NORMAL
BH CV LOWER VASCULAR LEFT MID FEMORAL SPONT: NORMAL
BH CV LOWER VASCULAR LEFT PERONEAL COMPRESS: NORMAL
BH CV LOWER VASCULAR LEFT POPLITEAL AUGMENT: NORMAL
BH CV LOWER VASCULAR LEFT POPLITEAL COMPETENT: NORMAL
BH CV LOWER VASCULAR LEFT POPLITEAL COMPRESS: NORMAL
BH CV LOWER VASCULAR LEFT POPLITEAL PHASIC: NORMAL
BH CV LOWER VASCULAR LEFT POPLITEAL SPONT: NORMAL
BH CV LOWER VASCULAR LEFT POSTERIOR TIBIAL COMPRESS: NORMAL
BH CV LOWER VASCULAR LEFT PROFUNDA FEMORAL COMPRESS: NORMAL
BH CV LOWER VASCULAR LEFT PROXIMAL FEMORAL COMPRESS: NORMAL
BH CV LOWER VASCULAR LEFT SAPHENOFEMORAL JUNCTION COMPRESS: NORMAL
BH CV LOWER VASCULAR RIGHT COMMON FEMORAL AUGMENT: NORMAL
BH CV LOWER VASCULAR RIGHT COMMON FEMORAL COMPETENT: NORMAL
BH CV LOWER VASCULAR RIGHT COMMON FEMORAL COMPRESS: NORMAL
BH CV LOWER VASCULAR RIGHT COMMON FEMORAL PHASIC: NORMAL
BH CV LOWER VASCULAR RIGHT COMMON FEMORAL SPONT: NORMAL

## 2024-09-27 PROCEDURE — 93971 EXTREMITY STUDY: CPT

## 2024-10-07 ENCOUNTER — OFFICE VISIT (OUTPATIENT)
Age: 64
End: 2024-10-07
Payer: MEDICARE

## 2024-10-07 VITALS
RESPIRATION RATE: 14 BRPM | HEIGHT: 65 IN | OXYGEN SATURATION: 98 % | WEIGHT: 167 LBS | TEMPERATURE: 97.2 F | BODY MASS INDEX: 27.82 KG/M2 | SYSTOLIC BLOOD PRESSURE: 124 MMHG | DIASTOLIC BLOOD PRESSURE: 70 MMHG

## 2024-10-07 DIAGNOSIS — M70.50 PES ANSERINE BURSITIS: Primary | ICD-10-CM

## 2024-10-07 RX ORDER — METHYLPREDNISOLONE ACETATE 80 MG/ML
40 INJECTION, SUSPENSION INTRA-ARTICULAR; INTRALESIONAL; INTRAMUSCULAR; SOFT TISSUE ONCE
Status: COMPLETED | OUTPATIENT
Start: 2024-10-07 | End: 2024-10-07

## 2024-10-07 RX ORDER — METHYLPREDNISOLONE ACETATE 40 MG/ML
40 INJECTION, SUSPENSION INTRA-ARTICULAR; INTRALESIONAL; INTRAMUSCULAR; SOFT TISSUE ONCE
Status: DISCONTINUED | OUTPATIENT
Start: 2024-10-07 | End: 2024-10-07

## 2024-10-07 RX ORDER — LIDOCAINE HYDROCHLORIDE 10 MG/ML
5 INJECTION, SOLUTION INFILTRATION; PERINEURAL ONCE
Status: COMPLETED | OUTPATIENT
Start: 2024-10-07 | End: 2024-10-07

## 2024-10-07 RX ORDER — METHYLPREDNISOLONE ACETATE 40 MG/ML
40 INJECTION, SUSPENSION INTRA-ARTICULAR; INTRALESIONAL; INTRAMUSCULAR; SOFT TISSUE ONCE
Status: CANCELLED | OUTPATIENT
Start: 2024-10-07

## 2024-10-07 RX ADMIN — LIDOCAINE HYDROCHLORIDE 5 ML: 10 INJECTION, SOLUTION INFILTRATION; PERINEURAL at 09:41

## 2024-10-07 RX ADMIN — METHYLPREDNISOLONE ACETATE 40 MG: 80 INJECTION, SUSPENSION INTRA-ARTICULAR; INTRALESIONAL; INTRAMUSCULAR; SOFT TISSUE at 15:52

## 2024-10-07 NOTE — PROGRESS NOTES
"Chief Complaint  Leg Pain    Subjective        Kaitlin Alvarez presents to [facility]  History of Present Illness  Pt is complaining of left leg pain around the knee. The patient states its been going on a week and getting worse.   The patient has on her medial left knee that has been present since she was stung by wasp about 2 weeks ago.  She has pain to light touch on the medial portion of her left knee.    Objective   Vital Signs:  /70 (BP Location: Left arm, Patient Position: Sitting, Cuff Size: Adult)   Temp 97.2 °F (36.2 °C) (Temporal)   Resp 14   Ht 165 cm (64.96\")   Wt 75.8 kg (167 lb)   SpO2 98%   BMI 27.82 kg/m²   Estimated body mass index is 27.82 kg/m² as calculated from the following:    Height as of this encounter: 165 cm (64.96\").    Weight as of this encounter: 75.8 kg (167 lb).            Physical Exam  Constitutional:       General: She is not in acute distress.     Appearance: Normal appearance. She is not ill-appearing, toxic-appearing or diaphoretic.   HENT:      Head: Normocephalic and atraumatic.      Right Ear: There is no impacted cerumen.      Left Ear: There is no impacted cerumen.      Nose: No congestion or rhinorrhea.      Mouth/Throat:      Pharynx: Oropharynx is clear. No oropharyngeal exudate or posterior oropharyngeal erythema.   Eyes:      General: No scleral icterus.        Right eye: No discharge.         Left eye: No discharge.      Extraocular Movements: Extraocular movements intact.      Conjunctiva/sclera: Conjunctivae normal.      Pupils: Pupils are equal, round, and reactive to light.   Cardiovascular:      Rate and Rhythm: Normal rate and regular rhythm.      Pulses: Normal pulses.      Heart sounds: Normal heart sounds.   Pulmonary:      Effort: Pulmonary effort is normal.      Breath sounds: Normal breath sounds.   Abdominal:      General: Abdomen is flat. Bowel sounds are normal.      Palpations: Abdomen is soft. "   Musculoskeletal:         General: Normal range of motion.      Left elbow: Swelling present. Tenderness present.      Cervical back: Normal range of motion and neck supple.      Left knee: Bony tenderness and crepitus present.   Skin:     General: Skin is warm.   Neurological:      General: No focal deficit present.      Mental Status: She is alert and oriented to person, place, and time. Mental status is at baseline.   Psychiatric:         Mood and Affect: Mood normal.         Behavior: Behavior normal.         Thought Content: Thought content normal.         Judgment: Judgment normal.        Result Review :           Assessment and Plan   Diagnoses and all orders for this visit:    1. Pes anserine bursitis (Primary)      Knee    The procedure was explained to the patient including its potential risk and benefit.    The patient was sitting comfortably with the left knee exposed.    The knee was prepped and draped in a sterile fashion.      A 25-gauge 1-1/2  inch needle with 5cc of 1% lidocaine was used for anesthesia.  5  The patient was approachedanteriorly.  When anesthesia was adequately achieved 40mg of Depomedrol was injected.      The needle was withdrawn and a sterile dressing was applied.  The patient tolerated the procedure well.      The patient was instructed to avoid heavy lifting or vigorous activity for 48 hours.      Follow Up   Return in about 1 week (around 10/14/2024).  Patient was given instructions and counseling regarding her condition or for health maintenance advice. Please see specific information pulled into the AVS if appropriate.

## 2024-10-15 ENCOUNTER — OFFICE VISIT (OUTPATIENT)
Age: 64
End: 2024-10-15
Payer: MEDICARE

## 2024-10-15 VITALS
RESPIRATION RATE: 14 BRPM | TEMPERATURE: 97.2 F | WEIGHT: 167 LBS | HEIGHT: 65 IN | DIASTOLIC BLOOD PRESSURE: 74 MMHG | SYSTOLIC BLOOD PRESSURE: 130 MMHG | BODY MASS INDEX: 27.82 KG/M2

## 2024-10-15 DIAGNOSIS — M17.11 PRIMARY OSTEOARTHRITIS OF RIGHT KNEE: ICD-10-CM

## 2024-10-15 DIAGNOSIS — M23.342 MENISCUS, LATERAL, ANTERIOR HORN DERANGEMENT, LEFT: Primary | ICD-10-CM

## 2024-10-15 PROBLEM — E83.19 IRON OVERLOAD: Status: ACTIVE | Noted: 2018-10-16

## 2024-10-15 PROBLEM — G90.519 COMPLEX REGIONAL PAIN SYNDROME OF UPPER EXTREMITY: Status: ACTIVE | Noted: 2023-08-31

## 2024-10-15 PROBLEM — G62.9 SENSORY NEUROPATHY: Status: ACTIVE | Noted: 2018-06-12

## 2024-10-15 PROBLEM — G25.81 RESTLESS LEGS: Status: ACTIVE | Noted: 2018-06-12

## 2024-10-15 PROBLEM — T63.441A: Status: ACTIVE | Noted: 2024-10-15

## 2024-10-15 PROCEDURE — 1159F MED LIST DOCD IN RCRD: CPT | Performed by: FAMILY MEDICINE

## 2024-10-15 PROCEDURE — 1160F RVW MEDS BY RX/DR IN RCRD: CPT | Performed by: FAMILY MEDICINE

## 2024-10-15 PROCEDURE — 99212 OFFICE O/P EST SF 10 MIN: CPT | Performed by: FAMILY MEDICINE

## 2024-10-15 PROCEDURE — 1125F AMNT PAIN NOTED PAIN PRSNT: CPT | Performed by: FAMILY MEDICINE

## 2024-10-15 NOTE — PROGRESS NOTES
"Chief Complaint  Knee Pain    Subjective        Kaitlin Alvarez presents to [facility]  History of Present Illness  Pt is following up on left leg pain around the knee. Last week she received an injection.  The pain has resolves a little.   Knee Pain     Livia comes in in follow-up for her knee pain she is still currently having pain especially on the medial side and she does have a lot of crepitus in her knee on exam.    Objective   Vital Signs:  /74 (BP Location: Right arm, Patient Position: Sitting, Cuff Size: Adult)   Temp 97.2 °F (36.2 °C) (Temporal)   Resp 14   Ht 165 cm (64.96\")   Wt 75.8 kg (167 lb)   BMI 27.82 kg/m²   Estimated body mass index is 27.82 kg/m² as calculated from the following:    Height as of this encounter: 165 cm (64.96\").    Weight as of this encounter: 75.8 kg (167 lb).            Physical Exam  Constitutional:       General: She is not in acute distress.     Appearance: Normal appearance. She is not ill-appearing, toxic-appearing or diaphoretic.   HENT:      Head: Normocephalic and atraumatic.      Right Ear: There is no impacted cerumen.      Left Ear: There is no impacted cerumen.      Nose: No congestion or rhinorrhea.      Mouth/Throat:      Pharynx: Oropharynx is clear. No oropharyngeal exudate or posterior oropharyngeal erythema.   Eyes:      General: No scleral icterus.        Right eye: No discharge.         Left eye: No discharge.      Extraocular Movements: Extraocular movements intact.      Conjunctiva/sclera: Conjunctivae normal.      Pupils: Pupils are equal, round, and reactive to light.   Cardiovascular:      Rate and Rhythm: Normal rate and regular rhythm.      Pulses: Normal pulses.      Heart sounds: Normal heart sounds.   Pulmonary:      Effort: Pulmonary effort is normal.      Breath sounds: Normal breath sounds.   Abdominal:      General: Abdomen is flat. Bowel sounds are normal.      Palpations: Abdomen is soft. "   Musculoskeletal:         General: Normal range of motion.      Left elbow: Swelling present. Tenderness present.      Cervical back: Normal range of motion and neck supple.      Left knee: Bony tenderness and crepitus present. Decreased range of motion. Tenderness present over the medial joint line. Abnormal alignment.   Skin:     General: Skin is warm.   Neurological:      General: No focal deficit present.      Mental Status: She is alert and oriented to person, place, and time. Mental status is at baseline.   Psychiatric:         Mood and Affect: Mood normal.         Behavior: Behavior normal.         Thought Content: Thought content normal.         Judgment: Judgment normal.        Result Review :           Assessment and Plan   Diagnoses and all orders for this visit:    1. Meniscus, lateral, anterior horn derangement, left (Primary)  -     MRI Knee Right Without Contrast; Future    2. Primary osteoarthritis of right knee           Follow Up   No follow-ups on file.  Patient was given instructions and counseling regarding her condition or for health maintenance advice. Please see specific information pulled into the AVS if appropriate.

## 2024-11-04 ENCOUNTER — OFFICE VISIT (OUTPATIENT)
Age: 64
End: 2024-11-04
Payer: MEDICARE

## 2024-11-04 VITALS
HEIGHT: 65 IN | TEMPERATURE: 98.4 F | WEIGHT: 167.9 LBS | HEART RATE: 81 BPM | BODY MASS INDEX: 27.97 KG/M2 | SYSTOLIC BLOOD PRESSURE: 106 MMHG | OXYGEN SATURATION: 97 % | DIASTOLIC BLOOD PRESSURE: 70 MMHG

## 2024-11-04 DIAGNOSIS — H65.191 ACUTE MUCOID OTITIS MEDIA OF RIGHT EAR: Primary | ICD-10-CM

## 2024-11-04 PROCEDURE — 1126F AMNT PAIN NOTED NONE PRSNT: CPT

## 2024-11-04 PROCEDURE — 1159F MED LIST DOCD IN RCRD: CPT

## 2024-11-04 PROCEDURE — 1160F RVW MEDS BY RX/DR IN RCRD: CPT

## 2024-11-04 PROCEDURE — 99213 OFFICE O/P EST LOW 20 MIN: CPT

## 2024-11-04 RX ORDER — AZITHROMYCIN 250 MG/1
TABLET, FILM COATED ORAL
Qty: 6 TABLET | Refills: 0 | Status: SHIPPED | OUTPATIENT
Start: 2024-11-04

## 2024-11-04 RX ORDER — PREDNISONE 10 MG/1
TABLET ORAL
Qty: 1 TABLET | Refills: 0 | Status: SHIPPED | OUTPATIENT
Start: 2024-11-04

## 2024-11-04 NOTE — PROGRESS NOTES
"Chief Complaint  Cough    Subjective        Kaitlin Alvarez presents to [facility]  Cough        History of Present Illness  The patient presents for evaluation of a cough.    She has been exposed to a [place] environment due to road construction and has been engaging in outdoor activities such as playing ball and mowing grass. This has led to the onset of a dry cough, which is accompanied by ear discomfort. She also experiences clear nasal discharge when she blows her nose. These symptoms have persisted for a week. Despite taking Tylenol Cold and Flu, Flonase (one spray in each nostril twice daily), and albuterol, her symptoms have not improved. She has discontinued the use of prednisone.    Her current medications include aspirin, gabapentin, Myrbetriq, Singulair, Topamax, and oral B12. She has a history of sinus surgery and has experienced ear infections since birth, occurring 2 to 3 times a year. She has recently cleaned her ears and is curious if the dust could be causing her symptoms.    She is scheduled to have an MRI of her knee on Friday.    ALLERGIES  She is allergic to SULFA.       Objective   Vital Signs:  /70 (BP Location: Right arm, Patient Position: Sitting, Cuff Size: Adult)   Pulse 81   Temp 98.4 °F (36.9 °C) (Oral)   Ht 165 cm (64.96\")   Wt 76.2 kg (167 lb 14.4 oz)   SpO2 97%   BMI 27.97 kg/m²   Estimated body mass index is 27.97 kg/m² as calculated from the following:    Height as of this encounter: 165 cm (64.96\").    Weight as of this encounter: 76.2 kg (167 lb 14.4 oz).            Review of Systems   Respiratory:  Positive for cough.       Physical Exam  Constitutional:       Appearance: She is obese.   HENT:      Head: Normocephalic and atraumatic.      Right Ear: Hearing, ear canal and external ear normal. A middle ear effusion is present. Tympanic membrane is injected.      Left Ear: Hearing, tympanic membrane, ear canal and external ear normal. "   Cardiovascular:      Rate and Rhythm: Normal rate and regular rhythm.      Pulses: Normal pulses.      Heart sounds: Normal heart sounds.   Pulmonary:      Effort: Pulmonary effort is normal.      Breath sounds: Normal breath sounds.   Neurological:      Mental Status: She is alert.        Result Review :          Results                Assessment and Plan   Diagnoses and all orders for this visit:    1. Acute mucoid otitis media of right ear (Primary)  -     predniSONE (DELTASONE) 10 MG (48) dose pack; As directed  Dispense: 1 tablet; Refill: 0  -     azithromycin (Zithromax Z-Israel) 250 MG tablet; Take 2 tablets by mouth on day 1, then 1 tablet daily on days 2-5  Dispense: 6 tablet; Refill: 0        Assessment & Plan  1. Right middle ear infection.  Symptoms include a cough, ear congestion, and a history of ear infections. Physical examination confirmed a right middle ear infection. She was advised to continue using Flonase twice daily, with one spray in each nostril, ensuring it is administered into the nose and not the back of the throat. A gentle Valsalva maneuver was recommended 10 to 15 minutes post-application. Steroids were prescribed to manage inflammation.     2. Allergic rhinitis.  Symptoms include sneezing, coughing, and clear nasal discharge, exacerbated by dust exposure and outdoor activities. She has been using Flonase and albuterol without significant relief. Continued use of Flonase was recommended. She was also advised to avoid allergens as much as possible.                 Follow Up   No follow-ups on file.  Patient was given instructions and counseling regarding her condition or for health maintenance advice. Please see specific information pulled into the AVS if appropriate.         Patient or patient representative verbalized consent for the use of Ambient Listening during the visit with  RAHEEL Vargas for chart documentation. 11/4/2024  16:32 EST

## 2024-11-06 DIAGNOSIS — M23.342 MENISCUS, LATERAL, ANTERIOR HORN DERANGEMENT, LEFT: Primary | ICD-10-CM

## 2024-11-11 ENCOUNTER — TELEPHONE (OUTPATIENT)
Age: 64
End: 2024-11-11

## 2024-11-13 ENCOUNTER — TELEPHONE (OUTPATIENT)
Dept: ONCOLOGY | Facility: CLINIC | Age: 64
End: 2024-11-13
Payer: MEDICARE

## 2024-11-13 NOTE — TELEPHONE ENCOUNTER
CALLED AND SPOKE WITH THE PATIENT AND SHE IS OK WITH SEEING NP AS SHE IS LEAVING FOR [place] - PT HAS CONFIRMED APPT

## 2024-11-13 NOTE — TELEPHONE ENCOUNTER
Caller: Kaitlin Alvarez    Relationship: Self    Best call back number: [phone]     What is the best time to reach you: ASAP    Who are you requesting to speak with (clinical staff, provider,  specific staff member): SCHEDULING        What was the call regarding: PLEASE CALL PT TO RESCHEDULE HER ONE YEAR FOLLOW UP WITH DR ZURITA THAT WAS ORIGINALLY SCHEDULED FOR THIS WEEK BUT WAS CANCELED IN DECEMBER OF LAST YEAR, SHE IS NOT SURE WHY IT WAS CANCELED AT THAT TIME.

## 2024-11-14 NOTE — TELEPHONE ENCOUNTER
Spoke to pt, let her know a MRI was going to give us more detail about what is going on with her chronic pain. Pt will keep MRI appt.

## 2024-11-18 ENCOUNTER — TELEPHONE (OUTPATIENT)
Age: 64
End: 2024-11-18
Payer: MEDICARE

## 2024-11-18 DIAGNOSIS — G89.29 CHRONIC PAIN OF LEFT KNEE: Primary | ICD-10-CM

## 2024-11-18 DIAGNOSIS — M25.562 CHRONIC PAIN OF LEFT KNEE: Primary | ICD-10-CM

## 2024-11-18 NOTE — TELEPHONE ENCOUNTER
Patient insurance wouldn't allow MRI she is asking that orders be put in for x-Ray. Can you put these orders in and notify the patient.

## 2024-11-19 DIAGNOSIS — D50.9 IRON DEFICIENCY ANEMIA, UNSPECIFIED IRON DEFICIENCY ANEMIA TYPE: ICD-10-CM

## 2024-11-19 DIAGNOSIS — E83.110 HEREDITARY HEMOCHROMATOSIS: Primary | ICD-10-CM

## 2024-11-20 ENCOUNTER — LAB (OUTPATIENT)
Dept: OTHER | Facility: HOSPITAL | Age: 64
End: 2024-11-20
Payer: MEDICARE

## 2024-11-20 ENCOUNTER — OFFICE VISIT (OUTPATIENT)
Dept: ONCOLOGY | Facility: CLINIC | Age: 64
End: 2024-11-20
Payer: MEDICARE

## 2024-11-20 VITALS
BODY MASS INDEX: 28.06 KG/M2 | SYSTOLIC BLOOD PRESSURE: 122 MMHG | TEMPERATURE: 97.7 F | HEART RATE: 85 BPM | DIASTOLIC BLOOD PRESSURE: 79 MMHG | WEIGHT: 168.4 LBS | OXYGEN SATURATION: 97 % | HEIGHT: 65 IN

## 2024-11-20 DIAGNOSIS — E83.110 HEREDITARY HEMOCHROMATOSIS: Primary | ICD-10-CM

## 2024-11-20 DIAGNOSIS — D50.9 IRON DEFICIENCY ANEMIA, UNSPECIFIED IRON DEFICIENCY ANEMIA TYPE: ICD-10-CM

## 2024-11-20 DIAGNOSIS — E83.110 HEREDITARY HEMOCHROMATOSIS: ICD-10-CM

## 2024-11-20 LAB
ALBUMIN SERPL-MCNC: 4 G/DL (ref 3.5–5.2)
ALBUMIN/GLOB SERPL: 1.3 G/DL
ALP SERPL-CCNC: 123 U/L (ref 39–117)
ALT SERPL W P-5'-P-CCNC: 86 U/L (ref 1–33)
ANION GAP SERPL CALCULATED.3IONS-SCNC: 9.4 MMOL/L (ref 5–15)
AST SERPL-CCNC: 36 U/L (ref 1–32)
BASOPHILS # BLD AUTO: 0.03 10*3/MM3 (ref 0–0.2)
BASOPHILS NFR BLD AUTO: 0.3 % (ref 0–1.5)
BILIRUB SERPL-MCNC: 0.4 MG/DL (ref 0–1.2)
BUN SERPL-MCNC: 13 MG/DL (ref 8–23)
BUN/CREAT SERPL: 15.9 (ref 7–25)
CALCIUM SPEC-SCNC: 9.1 MG/DL (ref 8.6–10.5)
CHLORIDE SERPL-SCNC: 104 MMOL/L (ref 98–107)
CO2 SERPL-SCNC: 25.6 MMOL/L (ref 22–29)
CREAT SERPL-MCNC: 0.82 MG/DL (ref 0.57–1)
DEPRECATED RDW RBC AUTO: 46.1 FL (ref 37–54)
EGFRCR SERPLBLD CKD-EPI 2021: 81 ML/MIN/1.73
EOSINOPHIL # BLD AUTO: 0.24 10*3/MM3 (ref 0–0.4)
EOSINOPHIL NFR BLD AUTO: 2.6 % (ref 0.3–6.2)
ERYTHROCYTE [DISTWIDTH] IN BLOOD BY AUTOMATED COUNT: 14.1 % (ref 12.3–15.4)
FERRITIN SERPL-MCNC: 43.8 NG/ML (ref 13–150)
GLOBULIN UR ELPH-MCNC: 3.1 GM/DL
GLUCOSE SERPL-MCNC: 201 MG/DL (ref 65–99)
HCT VFR BLD AUTO: 42.3 % (ref 34–46.6)
HGB BLD-MCNC: 12.9 G/DL (ref 12–15.9)
IMM GRANULOCYTES # BLD AUTO: 0.04 10*3/MM3 (ref 0–0.05)
IMM GRANULOCYTES NFR BLD AUTO: 0.4 % (ref 0–0.5)
IRON 24H UR-MRATE: 87 MCG/DL (ref 37–145)
IRON SATN MFR SERPL: 21 % (ref 20–50)
LYMPHOCYTES # BLD AUTO: 1.81 10*3/MM3 (ref 0.7–3.1)
LYMPHOCYTES NFR BLD AUTO: 19.6 % (ref 19.6–45.3)
MCH RBC QN AUTO: 27.2 PG (ref 26.6–33)
MCHC RBC AUTO-ENTMCNC: 30.5 G/DL (ref 31.5–35.7)
MCV RBC AUTO: 89.1 FL (ref 79–97)
MONOCYTES # BLD AUTO: 0.74 10*3/MM3 (ref 0.1–0.9)
MONOCYTES NFR BLD AUTO: 8 % (ref 5–12)
NEUTROPHILS NFR BLD AUTO: 6.36 10*3/MM3 (ref 1.7–7)
NEUTROPHILS NFR BLD AUTO: 69.1 % (ref 42.7–76)
NRBC BLD AUTO-RTO: 0 /100 WBC (ref 0–0.2)
PLATELET # BLD AUTO: 195 10*3/MM3 (ref 140–450)
PMV BLD AUTO: 9.1 FL (ref 6–12)
POTASSIUM SERPL-SCNC: 3.6 MMOL/L (ref 3.5–5.2)
PROT SERPL-MCNC: 7.1 G/DL (ref 6–8.5)
RBC # BLD AUTO: 4.75 10*6/MM3 (ref 3.77–5.28)
SODIUM SERPL-SCNC: 139 MMOL/L (ref 136–145)
TIBC SERPL-MCNC: 420 MCG/DL (ref 298–536)
TRANSFERRIN SERPL-MCNC: 282 MG/DL (ref 200–360)
WBC NRBC COR # BLD AUTO: 9.22 10*3/MM3 (ref 3.4–10.8)

## 2024-11-20 PROCEDURE — 85025 COMPLETE CBC W/AUTO DIFF WBC: CPT | Performed by: INTERNAL MEDICINE

## 2024-11-20 PROCEDURE — 36415 COLL VENOUS BLD VENIPUNCTURE: CPT

## 2024-11-20 PROCEDURE — 84466 ASSAY OF TRANSFERRIN: CPT | Performed by: INTERNAL MEDICINE

## 2024-11-20 PROCEDURE — 83540 ASSAY OF IRON: CPT | Performed by: INTERNAL MEDICINE

## 2024-11-20 PROCEDURE — 80053 COMPREHEN METABOLIC PANEL: CPT | Performed by: INTERNAL MEDICINE

## 2024-11-20 PROCEDURE — 82728 ASSAY OF FERRITIN: CPT | Performed by: INTERNAL MEDICINE

## 2024-11-20 NOTE — PROGRESS NOTES
.  REASON FOR FOLLOWUP:     1. Hereditary hemochromatosis positive for homozygous HFE mutation H63D.  Patient had elevated liver function panel, she was started on phlebotomy on 2018.   2.  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  Positive family history.              3.                      HISTORY OF PRESENT ILLNESS:  The patient is a 64 y.o. year old female with diabetes, hyperlipidemia, esophageal stricture, and hereditary hemochromatosis, who presented today, 2023, for an annual follow-up evaluation.    She returns today for annual follow up and lab review for hereditary hemochromatosis. She did not new concerns today in regards to her hemachromatosis.        Past Medical History:   Diagnosis Date    Allergic rhinitis     Anemia     Jensen's esophagus     Cervical cancer     Complex regional pain syndrome I of upper limb     Depression     Diabetes mellitus     Diabetes mellitus     Disease of thyroid gland     GERD (gastroesophageal reflux disease)     Hyperlipidemia     Hypothyroidism     IBS (irritable bowel syndrome)     Iron overload     Malignant neoplasm of cervix     Multiple sclerosis     Primary hemochromatosis     RLS (restless legs syndrome)     Seasonal allergic rhinitis     Seizures     Sensory neuropathy     Vitamin D deficiency      Past Surgical History:   Procedure Laterality Date     SECTION      x2    COLONOSCOPY N/A     COLONOSCOPY N/A 2024    Procedure: COLONOSCOPY into cecum with cold bx polypectomy;  Surgeon: Taryn Ortega MD;  Location: [facility] ENDOSCOPY;  Service: General;  Laterality: N/A;  pre: screen  post: polyp, poor prep    CYSTOSCOPY URETERAL BALLOON DILATATION      x4    ELBOW PROCEDURE Bilateral     ENDOSCOPY N/A 2020    ENDOSCOPY N/A 2024    Procedure: ESOPHAGOGASTRODUODENOSCOPY WITH BIOPSY, 18mm,19mm and 20mm balloon dilation;  Surgeon: Taryn Ortega MD;   Location: [facility] ENDOSCOPY;  Service: General;  Laterality: N/A;  pre: dysphagia  post: dysphagia    ESOPHAGEAL DILATATION      x8    GASTRIC BYPASS N/A     SINUS SURGERY         HEMATOLOGIC/ONCOLOGIC HISTORY:  The patient is a 58 y.o. year old female who is here on 11/15/2008 for initial evaluation and treatment plan for newly diagnosed hereditary hemochromatosis.      This patient has a family history of hereditary hemachromatosis and her mother also had factor II/prothrombin gene mutation and history of DVT.  This patient had examination at her primary care physician, Dr. Colon’ office, on 09/04/2018. Laboratory study at that time reported abnormal liver function panel including AST 86, ALT 99, alkaline phosphatase 121 with normal total bilirubin 0.4. She had total serum protein 7.7 and albumin 4.4. Her electrolytes were normal. Patient had elevated glucose 231. Her CBC showed mild erythrocytosis with RBC 5.3 million/mcL, upper normal hematocrit of 46.6% and hemoglobin 15.3%. Normal platelets at 87,000. Normal WBC 8400 including neutrophils 4100 and lymphocytes 3300, monocytes 500. Her ferritin level was elevated at 621 ng/mL, iron saturation 35% with free iron 406 and TIBC 302.  Normal B12 level 945, folic acid 11.5.  Hemoglobin A1c 7.0 and vitamin D 25.  Further laboratory study around 09/07/2018 reported negative for hepatitis B surface antigen, hepatitis B surface antibody, HCV antibody, HIV, and normal GGT 51.      Further studies on 10/16/2018 reported positive for homozygous HFE mutation at H63D.      This patient reports she has a family history of hemochromatosis and also prothrombin gene mutation.  Her mother had DVT and was found having factor II/prothrombin gene mutation.  One of her brothers and 1 of her sisters have hereditary hemochromatosis and both of them are high patient for treatment with frequent phlebotomies.  The patient herself has never had thrombophilia previously.    The patient  reports she had 2 term pregnancies.  She had menopause about 15 years ago.  She denies bleeding of any kind.  She was diagnosed with diabetes about 1-1/2 years ago.      The patient reports poor vision and intermittent diarrhea.  She has chronic low back pain and joint pain.  She has significant headaches.  No fainting or syncope.  She denies fevers, sweating, chills.      I saw her initially on 11/15/2018.  Since that time she had a 2 phlebotomy and IV normal saline infusion, and she has good tolerance.  She reports no fainting no dizziness.  This patient was also tested positive for heterozygous factor II mutation on 11/15/2018.    Pretreatment iron study reported at ferritin 270 ng/mL, free iron 79 TIBC 386 saturation 20% on 11/28/2018.  Hb 15.0, ,000 and WBC 6500.  Liver panel ALT 50 AST 30 alkaline phosphatase was 118 and the bilirubin 0.3.    Laboratory study on 12/20/2018 reported much improved ferritin 64.3, free iron 71 TIBC 375 and iron saturation 19%.  Hb 13.3, normal WBC and platelets.  Normalized ALT 28, AST 21, alkaline phosphatase is 116 and total bilirubin 0.4.  Patient had a phlebotomy of 500 mL.     She had laboratory studies on 6/6/2019 reporting ferritin 35.4, free iron 67 TIBC 411 and iron saturation 16%.  Her hemoglobin was at baseline 13.3, normal WBC 7700 and platelets 218,000.  Her ferritin is within the target, no phlebotomy at that time.     Laboratory study on 12/12/2019 reported ferritin 39.4, iron saturation 17%, and hemoglobin 13.8, hematocrit 44.1%, MCV 88.0, MCHC 31.3 platelets 217,000 and WBC 6200.  Her chemistry lab reported normal liver function panel, normal renal function normal electrolytes, marginal glucose 106.    Patient reports in the summer of 2020 had surgical procedure for esophageal stricture.  She was at a point not even able to swallow water.  She lost 30 pounds during the process.  She is better now after the procedure, however has not back to baseline  yet.    According to medical records, she had EGD examination with dilatation of the esophageal structure up to 20 cm and also had colonoscopy with polypectomy on 7/21/2020 by Dr. Ball.    Laboratory studies on 12/10/2020 reported hemoglobin 14.1, MCV 89.2, platelets 232,000 WBC 6620.  Iron study showed ferritin 26.3 ng/mL, free iron 71, and TIBC 138 and iron saturation 16%.  Chemistry lab reported unremarkable CMP..    Laboratory study on 12/16/2021 reported ferritin 46.1 ng/mL and iron saturation 27% with  TIBC 402.  She has normal hemoglobin Hb 13.4, platelets 222,000 WBC 5380.  Chemistry lab reported complete normal CMP, including liver function panel.    Laboratory studies today on 11/17/2022 reported a decreased ferritin level 21.3, and maintains iron saturation at 23% with a good free iron at 105. She also maintains normal hemoglobin 14.1 g/dL. Patients' ferritin 12/16/2021 was 46.1 ng/dL,  and free iron was 107.     The patient presented today on 11/17/2022 for annual evaluation to discuss lab results and management plan. She reports unintentional weight loss from having a rough year. She states that she has had some family members who have passed away, one being her mother who passed away 08/2022 from a myocardial infarction. She has lost a total of 12 people over the course of a year and half.    Patient denies any black stools or hematochezia. She had an EGD and colonoscopy with polypectomy in 2020. The colonoscopy was done on 07/20/2020, which revealed a small sigmoid colon polyp that was then resected. The pathology evaluation was tubular adenoma. The EGD was done on the same day as colonoscopy, reporting the biopsy from the antrum that reported mild chronic inflammation, negative for H. pylori, negative for intestinal metaplasia.  GE junction biopsy showed chronic inflammation in the gastric mucosa and extensive acute inflammation in the squamous mucosa with focal erosion in the GE junction  biopsy sample. No viral or fungal changes upon examination.     Patient reports her diabetes is well controlled.  She has stable weight.  Continue oral vitamin B12.    MEDICATIONS    Current Outpatient Medications:     Aspirin 81 MG capsule, Take 1 capsule every day by oral route., Disp: , Rfl:     atorvastatin (LIPITOR) 40 MG tablet, Take 1 tablet by mouth Daily., Disp: , Rfl:     azithromycin (Zithromax Z-Israel) 250 MG tablet, Take 2 tablets by mouth on day 1, then 1 tablet daily on days 2-5, Disp: 6 tablet, Rfl: 0    Cholecalciferol (VITAMIN D3) 2000 units tablet, Take  by mouth., Disp: , Rfl:     EPINEPHrine (EPIPEN) 0.3 MG/0.3ML solution auto-injector injection, , Disp: , Rfl:     esomeprazole (nexIUM) 40 MG capsule, Take 1 capsule by mouth Every Morning Before Breakfast., Disp: , Rfl:     fluticasone (FLONASE) 50 MCG/ACT nasal spray, , Disp: , Rfl:     gabapentin (NEURONTIN) 800 MG tablet, , Disp: , Rfl:     glucose blood test strip, Accu-Chek Nicky Plus test strips  check BS daily, Disp: , Rfl:     levothyroxine (SYNTHROID, LEVOTHROID) 100 MCG tablet, Take  by mouth., Disp: , Rfl:     linagliptin (TRADJENTA) 5 MG tablet tablet, Take 1 tablet by mouth Daily., Disp: , Rfl:     metFORMIN ER (GLUCOPHAGE-XR) 500 MG 24 hr tablet, Take 2 tablets by mouth 2 (Two) Times a Day., Disp: , Rfl:     montelukast (SINGULAIR) 10 MG tablet, montelukast 10 mg tablet  TAKE 1 TABLET BY MOUTH EVERY DAY, Disp: , Rfl:     Myrbetriq 50 MG tablet sustained-release 24 hour 24 hr tablet, Myrbetriq 50 mg tablet,extended release  TAKE 1 TABLET BY MOUTH EVERY DAY, Disp: , Rfl:     predniSONE (DELTASONE) 10 MG (48) dose pack, As directed, Disp: 1 tablet, Rfl: 0    topiramate (TOPAMAX) 50 MG tablet, Take 1 tablet by mouth 2 (Two) Times a Day., Disp: , Rfl:     vitamin B-12 (CYANOCOBALAMIN) 500 MCG tablet, Take 1 tablet by mouth Daily., Disp: , Rfl:     Current Facility-Administered Medications:     nitroglycerin (NITROSTAT) SL tablet 0.4 mg,  "0.4 mg, Sublingual, Q5 Min PRN, Griffin Wright III, MD    ALLERGIES:     Allergies   Allergen Reactions    Bee Pollen Swelling    Bee Venom Swelling    Sulfa Antibiotics Dizziness       SOCIAL HISTORY:       Social History     Socioeconomic History    Marital status: Single     Spouse name: Not on file    Number of children: 2     Years of education: High school education     Highest education level: High school    Social Needs    Financial resource strain: Not on file    Food insecurity - worry: Not on file    Food insecurity - inability: Not on file    Transportation needs - medical: Not on file    Transportation needs - non-medical: Not on file   Occupational History     Employer: DISABLED, previous had factory job    Tobacco Use    Smoking status: Never Smoker   Substance and Sexual Activity    Alcohol use: No    Drug use: No     Sexual activity: Not on file         FAMILY HISTORY:  Family History   Problem Relation Age of Onset    Breast cancer Maternal Aunt     Diabetes Other     Heart disease Other     Heart disease Mother         CABG x3    Diabetes Mother         Type 2    Hypertension Mother     Stroke Mother     Lung cancer Father     Liver cancer Brother 60    Diabetes Brother    brother has hereditary hemochromatosis, liver cancer because HH post liver transplant, he is undergoing phlebotomy treatment.    A sister has hemochromatosis undergoing phlebotomy treatment.   Mother had DVT, found to have prothrombin gene mutation.  She passed away in 2022.        Vitals:    11/20/24 1511   BP: 122/79   Pulse: 85   Temp: 97.7 °F (36.5 °C)   TempSrc: Oral   SpO2: 97%   Weight: 76.4 kg (168 lb 6.4 oz)   Height: 165 cm (64.96\")   PainSc: 0-No pain           11/16/2023     1:30 PM   Current Status   ECOG score 0      PHYSICAL EXAM:   GENERAL:  Well-developed, well-nourished female in no acute distress.   SKIN:  Warm, dry without rashes, purpura or petechiae.  HEENT:  Normocephalic.   LYMPHATICS:  No cervical, " supraclavicular adenopathy.  CHEST: Normal respiratory effort.  Lungs clear to auscultation. Good airflow.  CARDIAC:  Regular rate and rhythm without murmurs. Normal S1, S2.   ABDOMEN:  Soft, nontender with no organomegaly or masses.  Bowel sounds normal.  EXTREMITIES: Left arm is in sling.  1+ edema in left ankle.       RECENT LABS:  Results from last 7 days   Lab Units 11/20/24  1505   WBC 10*3/mm3 9.22   NEUTROS ABS 10*3/mm3 6.36   HEMOGLOBIN g/dL 12.9   HEMATOCRIT % 42.3   PLATELETS 10*3/mm3 195     Results from last 7 days   Lab Units 11/20/24  1505   SODIUM mmol/L 139   POTASSIUM mmol/L 3.6   CHLORIDE mmol/L 104   CO2 mmol/L 25.6   BUN mg/dL 13   CREATININE mg/dL 0.82   CALCIUM mg/dL 9.1   ALBUMIN g/dL 4.0   BILIRUBIN mg/dL 0.4   ALK PHOS U/L 123*   ALT (SGPT) U/L 86*   AST (SGOT) U/L 36*   GLUCOSE mg/dL 201*   FERRITIN ng/mL 43.80   IRON mcg/dL 87   TIBC mcg/dL 420             Assessment & Plan    1.  Hereditary hemochromatosis.  Patient had liver enzyme elevation at the time of diagnosis.  Her ferritin was elevated at up to 600 ng/mL, however was not as high as checked in our labs.    Nevertheless we started patient on phlebotomy and she had a great response after 2 sessions of phlebotomy in November 2018.  She had third phlebotomy in late December 2018 with a target ferritin of 50.  At that time laboratory study showed ferritin 64 and iron saturation 19%.   On 6/6/2019, iron studies showed ferritin 35 and iron saturation 16% and normal hemoglobin 13.3.  No phlebotomy at that time.  On 12/12/2019 ferritin 39.4, iron saturation 17%.  Maintains normal hemoglobin and normalized liver function panel.  She complains of fatigue.  Certainly there is no phlebotomy needed today.  Her iron study actually has evidence of mild iron deficiency now.  I wonder whether her fatigue is related to that.  We will need to pay more attention in the future, may consider LISA but target ferritin to 100 ng/mL to see if that will  balance her fatigue and goal of treatment.  On 12/10/2020, normal CBC, low iron saturation 16% with ferritin 26.3 ng/mL.  Unremarkable CMP.  No evidence of reaccumulation of iron, this is likely due to her poor oral intake earlier this year due to esophageal stricture.    On 12/16/2021 patient is ferritin 46.1 and iron saturation 27%, normal liver function panel, and normal hemoglobin.  No need for phlebotomy.  On 11/17/2022 ferritin 21.3 iron saturation 23%.  No phlebotomy.  Laboratory study on 11/16/2023 reported a slightly worsening ferritin 29.5 ng/mL compared to a year ago, for high iron saturation 48%, however, not to the point needs to be started on phlebotomy.  11/20/2024: Ferritin 43.80    2.Iron deficiency.   Laboratory studies on 11/17/2022 reported decreased ferritin level 21.3 ng/mL, and maintains iron saturation at 23 % with good free iron at 105. She also maintains normal hemoglobin 14.1 g/dL. Nevertheless, this is not expected to see her ferritin coming down without phlebotomy, and considering she has HFE gene mutation associated with hemochromatosis. I think this patient needs to be checked for GI bleeding, she reports no melena, hematochezia or any other bleeding. I think that she needs to be checked out for her stool occult blood. And I also encouraged her to have a colonoscopy done also because this is unusual decreasing ferritin without the phlebotomy or bleeding, this is not expected in this patient. We will test the patient for stool occult blood, testing kit will be provided today.  She submitted 3 stool samples on 11/20/2022 which were all negative for occult blood.  On 11/16/2023 ferritin 29.5 ng/mL.  Continue to monitor.      3.  Positive for heterozygous factor II (prothrombin gene) mutation.  She has no personal history of thrombophilia.  She had positive family history, with her mother having the same gene mutation and history of DVT.   Previously I asked patient to be mobile to avoid  blood staggering in her leg to form clots.  During travel such as on airplane or by automobile, she needs to walk around for a few minutes every 1-2 hours.  She voiced understanding.   Patient reports no edema in legs, no chest pain or dyspnea.      PLAN:  She does not need phlebotomy today.   Return to clinic in 1 year for MD visit, cbc, cmp, ferritin, and iron profile.   Instructed to reach out sooner with any new concerns.     Linsey Kelsey, APRN

## 2024-11-22 ENCOUNTER — HOSPITAL ENCOUNTER (OUTPATIENT)
Facility: HOSPITAL | Age: 64
Discharge: HOME | End: 2024-11-22
Payer: MEDICARE

## 2024-11-22 DIAGNOSIS — G89.29 CHRONIC PAIN OF LEFT KNEE: ICD-10-CM

## 2024-11-22 DIAGNOSIS — M25.562 CHRONIC PAIN OF LEFT KNEE: ICD-10-CM

## 2024-11-22 PROCEDURE — 73560 X-RAY EXAM OF KNEE 1 OR 2: CPT

## 2024-11-27 NOTE — TELEPHONE ENCOUNTER
Spoke to Pt, she states she wants to switch her medication. Levothyroxine 100mcg. I told pt she needed to make a apt to speak with the provider about switching and she needed a recent TSH.

## 2024-12-02 ENCOUNTER — TELEPHONE (OUTPATIENT)
Age: 64
End: 2024-12-02
Payer: MEDICARE

## 2024-12-02 NOTE — TELEPHONE ENCOUNTER
Pt called stating she needs her xray sent over to [facility], pt states that if we expedite this then [facility] will go ahead with her MRI. I'm not quite for sure what she is needing. Please advise pt.

## 2024-12-03 RX ORDER — TOPIRAMATE 50 MG/1
50 TABLET, FILM COATED ORAL 2 TIMES DAILY
Qty: 180 TABLET | Refills: 3 | Status: SHIPPED | OUTPATIENT
Start: 2024-12-03

## 2024-12-12 PROBLEM — Z79.4 TYPE 2 DIABETES MELLITUS WITH DIABETIC AUTONOMIC NEUROPATHY, WITH LONG-TERM CURRENT USE OF INSULIN: Status: ACTIVE | Noted: 2024-12-12

## 2024-12-12 PROBLEM — E11.43 TYPE 2 DIABETES MELLITUS WITH DIABETIC AUTONOMIC NEUROPATHY, WITH LONG-TERM CURRENT USE OF INSULIN: Status: ACTIVE | Noted: 2024-12-12

## 2024-12-12 PROBLEM — Z13.220 SCREENING FOR HYPERLIPIDEMIA: Status: ACTIVE | Noted: 2024-12-12

## 2024-12-17 ENCOUNTER — OFFICE VISIT (OUTPATIENT)
Age: 64
End: 2024-12-17
Payer: MEDICARE

## 2024-12-17 VITALS
SYSTOLIC BLOOD PRESSURE: 118 MMHG | DIASTOLIC BLOOD PRESSURE: 70 MMHG | HEIGHT: 66 IN | WEIGHT: 167 LBS | BODY MASS INDEX: 26.84 KG/M2 | TEMPERATURE: 97 F | RESPIRATION RATE: 14 BRPM

## 2024-12-17 DIAGNOSIS — Z79.4 TYPE 2 DIABETES MELLITUS WITH DIABETIC AUTONOMIC NEUROPATHY, WITH LONG-TERM CURRENT USE OF INSULIN: ICD-10-CM

## 2024-12-17 DIAGNOSIS — E03.9 ACQUIRED HYPOTHYROIDISM: ICD-10-CM

## 2024-12-17 DIAGNOSIS — E11.43 TYPE 2 DIABETES MELLITUS WITH DIABETIC AUTONOMIC NEUROPATHY, WITH LONG-TERM CURRENT USE OF INSULIN: ICD-10-CM

## 2024-12-17 DIAGNOSIS — E78.2 MIXED HYPERLIPIDEMIA: ICD-10-CM

## 2024-12-17 DIAGNOSIS — M70.52 PES ANSERINUS BURSITIS OF LEFT KNEE: Primary | ICD-10-CM

## 2024-12-17 PROCEDURE — 1125F AMNT PAIN NOTED PAIN PRSNT: CPT | Performed by: FAMILY MEDICINE

## 2024-12-17 PROCEDURE — 20610 DRAIN/INJ JOINT/BURSA W/O US: CPT | Performed by: FAMILY MEDICINE

## 2024-12-17 PROCEDURE — 99214 OFFICE O/P EST MOD 30 MIN: CPT | Performed by: FAMILY MEDICINE

## 2024-12-17 RX ORDER — TRIAMCINOLONE ACETONIDE 40 MG/ML
40 INJECTION, SUSPENSION INTRA-ARTICULAR; INTRAMUSCULAR ONCE
Status: COMPLETED | OUTPATIENT
Start: 2024-12-17 | End: 2024-12-17

## 2024-12-17 RX ORDER — LIDOCAINE HYDROCHLORIDE 10 MG/ML
5 INJECTION, SOLUTION INFILTRATION; PERINEURAL ONCE
Status: COMPLETED | OUTPATIENT
Start: 2024-12-17 | End: 2024-12-17

## 2024-12-17 RX ORDER — THYROID 60 MG/1
60 TABLET ORAL DAILY
Qty: 90 TABLET | Refills: 3 | Status: SHIPPED | OUTPATIENT
Start: 2024-12-17

## 2024-12-17 RX ADMIN — TRIAMCINOLONE ACETONIDE 40 MG: 40 INJECTION, SUSPENSION INTRA-ARTICULAR; INTRAMUSCULAR at 12:19

## 2024-12-17 RX ADMIN — LIDOCAINE HYDROCHLORIDE 5 ML: 10 INJECTION, SOLUTION INFILTRATION; PERINEURAL at 12:18

## 2024-12-17 NOTE — PROGRESS NOTES
"Chief Complaint  Hypothyroidism    Subjective        Kaitlin Alvarez presents to [facility]  Hypothyroidism        History of Present Illness  The patient presents for evaluation of left knee pain, hypothyroidism, type 2 diabetes, and hyperlipidemia.    She reports persistent discomfort in her left knee, which she attributes to a previous fall down the stairs. The pain is localized to one side of the knee and is exacerbated by touch. She has discontinued ascending stairs due to the pain. She rates her pain as an 8 on a scale of 10. An x-ray was performed, but it did not reveal any significant findings. She has been applying a topical roll-on analgesic for relief. She is not currently engaged in any physical therapy for this issue.    She has expressed a desire to alter her thyroid medication due to perceived side effects, including hair loss and bone damage. She has been off her current thyroid medication for approximately one week.    She is due for a comprehensive panel of laboratory tests. She is currently on a regimen of metformin, administered twice daily, and atorvastatin for cholesterol management. She reports good tolerance of these medications.    MEDICATIONS  Current: Metformin, atorvastatin.       Objective   Vital Signs:  /70 (BP Location: Left arm, Patient Position: Sitting, Cuff Size: Adult)   Temp 97 °F (36.1 °C) (Temporal)   Resp 14   Ht 168 cm (66.14\")   Wt 75.8 kg (167 lb)   BMI 26.84 kg/m²   Estimated body mass index is 26.84 kg/m² as calculated from the following:    Height as of this encounter: 168 cm (66.14\").    Weight as of this encounter: 75.8 kg (167 lb).            Physical Exam  Constitutional:       General: She is not in acute distress.     Appearance: Normal appearance. She is not ill-appearing, toxic-appearing or diaphoretic.   HENT:      Head: Normocephalic and atraumatic.      Right Ear: There is no impacted cerumen.      Left Ear: There " is no impacted cerumen.      Nose: No congestion or rhinorrhea.      Mouth/Throat:      Pharynx: Oropharynx is clear. No oropharyngeal exudate or posterior oropharyngeal erythema.   Eyes:      General: No scleral icterus.        Right eye: No discharge.         Left eye: No discharge.      Extraocular Movements: Extraocular movements intact.      Conjunctiva/sclera: Conjunctivae normal.      Pupils: Pupils are equal, round, and reactive to light.   Cardiovascular:      Rate and Rhythm: Normal rate and regular rhythm.      Pulses: Normal pulses.      Heart sounds: Normal heart sounds.   Pulmonary:      Effort: Pulmonary effort is normal.      Breath sounds: Normal breath sounds.   Abdominal:      General: Abdomen is flat. Bowel sounds are normal.      Palpations: Abdomen is soft.   Musculoskeletal:         General: Normal range of motion.      Cervical back: Normal range of motion and neck supple.      Left knee: Bony tenderness present. Decreased range of motion. Tenderness present.   Skin:     General: Skin is warm.   Neurological:      General: No focal deficit present.      Mental Status: She is alert and oriented to person, place, and time. Mental status is at baseline.   Psychiatric:         Mood and Affect: Mood normal.         Behavior: Behavior normal.         Thought Content: Thought content normal.         Judgment: Judgment normal.                    Result Review :    Results  Imaging  X-ray of the knee showed no abnormalities.              Assessment and Plan   Diagnoses and all orders for this visit:    1. Pes anserinus bursitis of left knee (Primary)  -     triamcinolone acetonide (KENALOG-40) injection 40 mg  -     lidocaine (XYLOCAINE) 1 % injection 5 mL    2. Type 2 diabetes mellitus with diabetic autonomic neuropathy, with long-term current use of insulin  -     Hemoglobin A1c  -     Basic Metabolic Panel    3. Mixed hyperlipidemia  -     Lipid Panel  -     Hepatic Function Panel; Future    4.  Acquired hypothyroidism  -     TSH+Free T4; Future  -     Thyroid (ARMOUR THYROID) 60 MG PO tablet; Take 1 tablet by mouth Daily.  Dispense: 90 tablet; Refill: 3        Assessment & Plan  1. Pes anserine bursitis.  An injection was administered to the left knee for pes anserine bursitis. She was advised that it would take about a day or two for the pain to resolve.    2. Hypothyroidism.  A prescription for [place] Thyroid 60 mg daily was provided and sent to [facility]. She was informed that the side effects she experienced were likely due to the disease, not the medication.    3. Type 2 diabetes mellitus.  Blood glucose levels and A1c will be checked. She will continue taking metformin twice a day.    4. Hyperlipidemia.  A lipid panel and hepatic function tests will be conducted. She will continue taking atorvastatin.    PROCEDURE  An injection was administered to the left knee for pes anserine bursitis.   Knee    The procedure was explained to the patient including its potential risk and benefit.    The patient was sitting comfortably with the left knee exposed.    The knee was prepped and draped in a sterile fashion.      A 25-gauge 1-1/2  inch needle with 5cc of 1% lidocaine was used for anesthesia.    The patient was approachedanteriorly.    When anesthesia was adequately achieved 40mg kenalog was injected into the pes anserine bursae    The needle was withdrawn and a sterile dressing was applied.  The patient tolerated the procedure well.      The patient was instructed to avoid heavy lifting or vigorous activity for 48 hours.        Follow Up   No follow-ups on file.  Patient was given instructions and counseling regarding her condition or for health maintenance advice. Please see specific information pulled into the AVS if appropriate.         Patient or patient representative verbalized consent for the use of Ambient Listening during the visit with  Gage Colon MD for chart documentation.  12/17/2024  10:32 EST

## 2024-12-21 LAB
BUN SERPL-MCNC: 15 MG/DL (ref 8–27)
BUN/CREAT SERPL: 17 (ref 12–28)
CALCIUM SERPL-MCNC: 9.9 MG/DL (ref 8.7–10.3)
CHLORIDE SERPL-SCNC: 105 MMOL/L (ref 96–106)
CHOLEST SERPL-MCNC: 165 MG/DL (ref 100–199)
CO2 SERPL-SCNC: 18 MMOL/L (ref 20–29)
CREAT SERPL-MCNC: 0.87 MG/DL (ref 0.57–1)
EGFRCR SERPLBLD CKD-EPI 2021: 75 ML/MIN/1.73
GLUCOSE SERPL-MCNC: 107 MG/DL (ref 70–99)
HBA1C MFR BLD: 9.1 % (ref 4.8–5.6)
HDLC SERPL-MCNC: 34 MG/DL
LDLC SERPL CALC-MCNC: 109 MG/DL (ref 0–99)
POTASSIUM SERPL-SCNC: 4.1 MMOL/L (ref 3.5–5.2)
SODIUM SERPL-SCNC: 138 MMOL/L (ref 134–144)
TRIGL SERPL-MCNC: 122 MG/DL (ref 0–149)
VLDLC SERPL CALC-MCNC: 22 MG/DL (ref 5–40)

## 2025-01-14 ENCOUNTER — OFFICE VISIT (OUTPATIENT)
Age: 65
End: 2025-01-14
Payer: MEDICARE

## 2025-01-14 VITALS
SYSTOLIC BLOOD PRESSURE: 130 MMHG | TEMPERATURE: 97.8 F | RESPIRATION RATE: 14 BRPM | WEIGHT: 167 LBS | HEART RATE: 78 BPM | HEIGHT: 66 IN | OXYGEN SATURATION: 98 % | BODY MASS INDEX: 26.84 KG/M2 | DIASTOLIC BLOOD PRESSURE: 70 MMHG

## 2025-01-14 DIAGNOSIS — E11.65 TYPE 2 DIABETES MELLITUS WITH HYPERGLYCEMIA, WITHOUT LONG-TERM CURRENT USE OF INSULIN: ICD-10-CM

## 2025-01-14 DIAGNOSIS — E11.9 TYPE 2 DIABETES MELLITUS WITHOUT COMPLICATION, WITHOUT LONG-TERM CURRENT USE OF INSULIN: Primary | ICD-10-CM

## 2025-01-14 PROCEDURE — 1160F RVW MEDS BY RX/DR IN RCRD: CPT | Performed by: FAMILY MEDICINE

## 2025-01-14 PROCEDURE — 1125F AMNT PAIN NOTED PAIN PRSNT: CPT | Performed by: FAMILY MEDICINE

## 2025-01-14 PROCEDURE — 1159F MED LIST DOCD IN RCRD: CPT | Performed by: FAMILY MEDICINE

## 2025-01-14 PROCEDURE — 99214 OFFICE O/P EST MOD 30 MIN: CPT | Performed by: FAMILY MEDICINE

## 2025-01-14 RX ORDER — DAPAGLIFLOZIN 10 MG/1
10 TABLET, FILM COATED ORAL DAILY
Qty: 90 TABLET | Refills: 3 | Status: SHIPPED | OUTPATIENT
Start: 2025-01-14

## 2025-01-14 RX ORDER — PRAMIPEXOLE DIHYDROCHLORIDE 0.12 MG/1
0.12 TABLET ORAL DAILY
COMMUNITY
Start: 2024-12-28

## 2025-01-14 NOTE — PROGRESS NOTES
"Chief Complaint  lab review    Subjective        Kaitlin Alvarez presents to [facility]  History of Present Illness    History of Present Illness  The patient presents for evaluation of diabetes mellitus, hypercholesterolemia, thyroid disorder, and hereditary hemochromatosis.    She has been making dietary modifications, including reducing her intake of sweet tea and snacks. She is currently on a regimen of Tradjenta and metformin for her diabetes management. She reports a family history of diabetes.    She is also on a daily dose of atorvastatin 40 mg for cholesterol management, which she tolerates well. She reports a family history of heart disease and stroke.    She has a history of thyroid issues and has recently undergone a change in her thyroid medication.    She has a history of hereditary hemochromatosis but reports no recent need for phlebotomy. She reports a family history of hereditary hemochromatosis.    Supplemental Information  She has a history of stroke.    FAMILY HISTORY  The patient reports a family history of diabetes, heart trouble, strokes, and cancer. Her mother had heart trouble, her brother had a triple bypass, and both her parents and brother had cancer. She also mentions a family history of hereditary hemochromatosis.    MEDICATIONS  Current: Tradjenta, metformin, atorvastatin       Objective   Vital Signs:  /70 (BP Location: Right arm, Patient Position: Sitting, Cuff Size: Adult)   Pulse 78   Temp 97.8 °F (36.6 °C) (Temporal)   Resp 14   Ht 168 cm (66.14\")   Wt 75.8 kg (167 lb)   SpO2 98%   BMI 26.84 kg/m²   Estimated body mass index is 26.84 kg/m² as calculated from the following:    Height as of this encounter: 168 cm (66.14\").    Weight as of this encounter: 75.8 kg (167 lb).            Physical Exam  Constitutional:       General: She is not in acute distress.     Appearance: Normal appearance. She is not ill-appearing, toxic-appearing or " diaphoretic.   HENT:      Head: Normocephalic and atraumatic.      Right Ear: There is no impacted cerumen.      Left Ear: There is no impacted cerumen.      Nose: No congestion or rhinorrhea.      Mouth/Throat:      Pharynx: Oropharynx is clear. No oropharyngeal exudate or posterior oropharyngeal erythema.   Eyes:      General: No scleral icterus.        Right eye: No discharge.         Left eye: No discharge.      Extraocular Movements: Extraocular movements intact.      Conjunctiva/sclera: Conjunctivae normal.      Pupils: Pupils are equal, round, and reactive to light.   Cardiovascular:      Rate and Rhythm: Normal rate and regular rhythm.      Pulses: Normal pulses.      Heart sounds: Normal heart sounds.   Pulmonary:      Effort: Pulmonary effort is normal.      Breath sounds: Normal breath sounds.   Abdominal:      General: Abdomen is flat. Bowel sounds are normal.      Palpations: Abdomen is soft.   Musculoskeletal:         General: Normal range of motion.      Cervical back: Normal range of motion and neck supple.   Skin:     General: Skin is warm.   Neurological:      General: No focal deficit present.      Mental Status: She is alert and oriented to person, place, and time. Mental status is at baseline.   Psychiatric:         Mood and Affect: Mood normal.         Behavior: Behavior normal.         Thought Content: Thought content normal.         Judgment: Judgment normal.                  Result Review :    Results  Laboratory Studies  Cholesterol was mildly elevated. Kidney function was normal with an EGFR of 75. Hemoglobin A1c is 9.2.              Assessment and Plan   Diagnoses and all orders for this visit:    1. Type 2 diabetes mellitus without complication, without long-term current use of insulin (Primary)  -     MicroAlbumin, Urine, Random - Urine, Clean Catch  -     dapagliflozin Propanediol 10 MG tablet; Take 10 mg by mouth Daily.  Dispense: 90 tablet; Refill: 3    2. Type 2 diabetes mellitus  with hyperglycemia, without long-term current use of insulin  -     MicroAlbumin, Urine, Random - Urine, Clean Catch  -     dapagliflozin Propanediol 10 MG tablet; Take 10 mg by mouth Daily.  Dispense: 90 tablet; Refill: 3        Assessment & Plan  1. Diabetes Mellitus.  Her diabetes is poorly controlled with an A1c of 9.2, up from 7.0 in July of last year. She is advised to eliminate sugar from her diet, including sweet tea. Farxiga will be added to her current regimen of metformin and Tradjenta. She will take one pill of Farxiga daily. A urine test will be conducted today.    2. Hypercholesterolemia.  Her cholesterol levels are borderline high, likely due to uncontrolled blood sugar levels. No changes to her cholesterol medication are necessary at this time. She is currently taking atorvastatin 40 mg daily and tolerating it well.    3. Thyroid Disorder.  She has a history of thyroid problems and has recently changed her thyroid medication.    4. Hereditary Hemochromatosis.  She has a family history of hereditary hemochromatosis and has not needed to have blood removed since her last treatment.    Follow-up  The patient will follow up in 3 months.          Follow Up   No follow-ups on file.  Patient was given instructions and counseling regarding her condition or for health maintenance advice. Please see specific information pulled into the AVS if appropriate.         Patient or patient representative verbalized consent for the use of Ambient Listening during the visit with  Gage Colon MD for chart documentation. 1/14/2025  12:58 EST

## 2025-01-15 LAB — MICROALBUMIN UR-MCNC: <3 UG/ML

## 2025-01-16 DIAGNOSIS — Z76.0 REPEAT PRESCRIPTION ISSUE: Primary | ICD-10-CM

## 2025-01-16 RX ORDER — ESOMEPRAZOLE MAGNESIUM 40 MG/1
40 CAPSULE, DELAYED RELEASE ORAL DAILY
Qty: 90 CAPSULE | Refills: 3 | Status: SHIPPED | OUTPATIENT
Start: 2025-01-16

## 2025-01-27 DIAGNOSIS — Z76.0 REPEAT PRESCRIPTION ISSUE: Primary | ICD-10-CM

## 2025-01-28 RX ORDER — GABAPENTIN 800 MG/1
TABLET ORAL
Qty: 405 TABLET | Refills: 1 | Status: SHIPPED | OUTPATIENT
Start: 2025-01-28

## 2025-01-29 RX ORDER — ATORVASTATIN CALCIUM 40 MG/1
40 TABLET, FILM COATED ORAL DAILY
Qty: 90 TABLET | Refills: 3 | Status: SHIPPED | OUTPATIENT
Start: 2025-01-29

## 2025-02-19 RX ORDER — METFORMIN HYDROCHLORIDE 500 MG/1
1000 TABLET, EXTENDED RELEASE ORAL 2 TIMES DAILY
Qty: 360 TABLET | Refills: 3 | Status: SHIPPED | OUTPATIENT
Start: 2025-02-19

## 2025-02-21 RX ORDER — PRAMIPEXOLE DIHYDROCHLORIDE 0.12 MG/1
0.12 TABLET ORAL DAILY
Qty: 90 TABLET | Refills: 4 | Status: SHIPPED | OUTPATIENT
Start: 2025-02-21

## 2025-03-03 RX ORDER — MIRABEGRON 50 MG/1
50 TABLET, FILM COATED, EXTENDED RELEASE ORAL DAILY
Qty: 90 TABLET | Refills: 3 | Status: SHIPPED | OUTPATIENT
Start: 2025-03-03

## 2025-03-17 RX ORDER — LINAGLIPTIN 5 MG/1
5 TABLET, FILM COATED ORAL DAILY
Qty: 90 TABLET | Refills: 4 | Status: SHIPPED | OUTPATIENT
Start: 2025-03-17

## 2025-04-09 ENCOUNTER — TELEPHONE (OUTPATIENT)
Age: 65
End: 2025-04-09
Payer: MEDICARE

## 2025-04-09 RX ORDER — MONTELUKAST SODIUM 10 MG/1
10 TABLET ORAL NIGHTLY
Qty: 90 TABLET | Refills: 3 | Status: SHIPPED | OUTPATIENT
Start: 2025-04-09 | End: 2025-04-14 | Stop reason: SDUPTHER

## 2025-04-09 RX ORDER — MONTELUKAST SODIUM 10 MG/1
10 TABLET ORAL NIGHTLY
COMMUNITY
End: 2025-04-09 | Stop reason: SDUPTHER

## 2025-04-09 RX ORDER — PRAMIPEXOLE DIHYDROCHLORIDE 0.12 MG/1
0.12 TABLET ORAL DAILY
Qty: 90 TABLET | Refills: 4 | Status: SHIPPED | OUTPATIENT
Start: 2025-04-09 | End: 2025-04-14 | Stop reason: SDUPTHER

## 2025-04-09 NOTE — TELEPHONE ENCOUNTER
Caller: Kaitlin Alvarez    Relationship: Self    Best call back number: [phone]       Who are you requesting to speak with (clinical staff, provider,  specific staff member): PCP OR CLINICAL        What was the call regarding: PATIENT IS REQUESTING A CALL BACK TO SEE IF SHE WILL BE DOING FASTING LABS FOR HER APPOINTMENT ON 4/14/25.

## 2025-04-14 ENCOUNTER — OFFICE VISIT (OUTPATIENT)
Age: 65
End: 2025-04-14
Payer: MEDICARE

## 2025-04-14 VITALS
BODY MASS INDEX: 24.75 KG/M2 | DIASTOLIC BLOOD PRESSURE: 74 MMHG | SYSTOLIC BLOOD PRESSURE: 120 MMHG | TEMPERATURE: 96.6 F | WEIGHT: 154 LBS | OXYGEN SATURATION: 92 % | HEART RATE: 80 BPM

## 2025-04-14 DIAGNOSIS — E03.9 ACQUIRED HYPOTHYROIDISM: ICD-10-CM

## 2025-04-14 DIAGNOSIS — S80.11XA CONTUSION OF RIGHT LOWER EXTREMITY, INITIAL ENCOUNTER: Primary | ICD-10-CM

## 2025-04-14 DIAGNOSIS — E11.65 TYPE 2 DIABETES MELLITUS WITH HYPERGLYCEMIA, WITHOUT LONG-TERM CURRENT USE OF INSULIN: ICD-10-CM

## 2025-04-14 DIAGNOSIS — E03.9 HYPOTHYROIDISM (ACQUIRED): ICD-10-CM

## 2025-04-14 PROCEDURE — 99214 OFFICE O/P EST MOD 30 MIN: CPT | Performed by: FAMILY MEDICINE

## 2025-04-14 PROCEDURE — G2211 COMPLEX E/M VISIT ADD ON: HCPCS | Performed by: FAMILY MEDICINE

## 2025-04-14 PROCEDURE — 1125F AMNT PAIN NOTED PAIN PRSNT: CPT | Performed by: FAMILY MEDICINE

## 2025-04-14 PROCEDURE — 1159F MED LIST DOCD IN RCRD: CPT | Performed by: FAMILY MEDICINE

## 2025-04-14 PROCEDURE — 1160F RVW MEDS BY RX/DR IN RCRD: CPT | Performed by: FAMILY MEDICINE

## 2025-04-14 RX ORDER — THYROID 60 MG/1
60 TABLET ORAL DAILY
Qty: 90 TABLET | Refills: 3 | Status: SHIPPED | OUTPATIENT
Start: 2025-04-14

## 2025-04-14 RX ORDER — ALBUTEROL SULFATE 90 UG/1
2 INHALANT RESPIRATORY (INHALATION)
COMMUNITY
Start: 2025-03-31

## 2025-04-14 RX ORDER — HYDROCHLOROTHIAZIDE 12.5 MG/1
CAPSULE ORAL DAILY
Qty: 6 EACH | Refills: 3 | Status: SHIPPED | OUTPATIENT
Start: 2025-04-14

## 2025-04-14 RX ORDER — MONTELUKAST SODIUM 10 MG/1
10 TABLET ORAL NIGHTLY
Qty: 90 TABLET | Refills: 3 | Status: SHIPPED | OUTPATIENT
Start: 2025-04-14

## 2025-04-14 RX ORDER — PRAMIPEXOLE DIHYDROCHLORIDE 0.12 MG/1
0.12 TABLET ORAL DAILY
Qty: 90 TABLET | Refills: 4 | Status: SHIPPED | OUTPATIENT
Start: 2025-04-14

## 2025-04-14 NOTE — PROGRESS NOTES
"Chief Complaint  Abrasion (Pt fell working on her kitchen 5 days ago)    Subjective        Kaitlin Alvarez presents to [facility]  Abrasion      History of Present Illness  The patient presents for evaluation of diabetes mellitus and right posterior hip contusion.    She has been experiencing increased urination at night, necessitating 3 bathroom visits per night, which she attributes to her Farxiga medication. Despite this, she continues to take Farxiga and Tradjenta. She has made dietary modifications, including the elimination of sweet tea from her diet, and has observed a decrease in her weight. She is seeking an alternative method for blood glucose monitoring that does not involve finger pricks. She was informed that she needs to have a urine test done.    She sustained a fall onto a decorative metal object, resulting in a contusion on her right posterior hip. She has not received a tetanus vaccination in recent years.    She is currently on [place] Thyroid medication and is requesting a refill.    MEDICATIONS  Current: Farxiga, Tradjenta, [place] Thyroid       Objective   Vital Signs:  /74 (BP Location: Right arm, Patient Position: Sitting, Cuff Size: Adult)   Pulse 80   Temp 96.6 °F (35.9 °C) (Oral)   Wt 69.9 kg (154 lb)   SpO2 92%   BMI 24.75 kg/m²   Estimated body mass index is 24.75 kg/m² as calculated from the following:    Height as of 1/14/25: 168 cm (66.14\").    Weight as of this encounter: 69.9 kg (154 lb).    BMI is within normal parameters. No other follow-up for BMI required.       Physical Exam  Constitutional:       General: She is not in acute distress.     Appearance: Normal appearance. She is not ill-appearing, toxic-appearing or diaphoretic.   HENT:      Head: Normocephalic and atraumatic.      Right Ear: There is no impacted cerumen.      Left Ear: There is no impacted cerumen.      Nose: No congestion or rhinorrhea.      Mouth/Throat:      Pharynx: " Oropharynx is clear. No oropharyngeal exudate or posterior oropharyngeal erythema.   Eyes:      General: No scleral icterus.        Right eye: No discharge.         Left eye: No discharge.      Extraocular Movements: Extraocular movements intact.      Conjunctiva/sclera: Conjunctivae normal.      Pupils: Pupils are equal, round, and reactive to light.   Cardiovascular:      Rate and Rhythm: Normal rate and regular rhythm.      Pulses: Normal pulses.      Heart sounds: Normal heart sounds.   Pulmonary:      Effort: Pulmonary effort is normal.      Breath sounds: Normal breath sounds.   Abdominal:      General: Abdomen is flat. Bowel sounds are normal.      Palpations: Abdomen is soft.   Musculoskeletal:         General: Normal range of motion.      Cervical back: Normal range of motion and neck supple.   Skin:     General: Skin is warm.   Neurological:      General: No focal deficit present.      Mental Status: She is alert and oriented to person, place, and time. Mental status is at baseline.   Psychiatric:         Mood and Affect: Mood normal.         Behavior: Behavior normal.         Thought Content: Thought content normal.         Judgment: Judgment normal.           Ears appear normal.       Result Review :    Results  Laboratory Studies  Hemoglobin A1c was 9.1.              Assessment and Plan   Diagnoses and all orders for this visit:    1. Contusion of right lower extremity, initial encounter (Primary)  -     Tdap Vaccine => 8yo IM (BOOSTRIX/ADACEL)    2. Type 2 diabetes mellitus with hyperglycemia, without long-term current use of insulin  -     Microalbumin / Creatinine Urine Ratio - Urine, Clean Catch  -     ORDER: Hemoglobin A1c  -     Basic Metabolic Panel  -     Hemoglobin A1c  -     Microalbumin / Creatinine Urine Ratio - Urine, Clean Catch; Future    3. Hypothyroidism (acquired)  -     TSH+Free T4; Future    4. Acquired hypothyroidism  -     Thyroid (ARMOUR THYROID) 60 MG PO tablet; Take 1 tablet by  mouth Daily.  Dispense: 90 tablet; Refill: 3    Other orders  -     Continuous Glucose Sensor (FreeStyle Serafin 3 Plus Sensor); Use Daily.  Dispense: 6 each; Refill: 3        Assessment & Plan  1. Diabetes mellitus.  Her hemoglobin A1c level is significantly elevated at 9.1. She reports nocturia, likely due to Farxiga, which helps excrete excess glucose. She is advised to avoid fluid intake after 6 PM to reduce nocturia. A continuous glucose monitor will be provided through [facility]. Blood glucose levels and A1c will be rechecked today. A urine test will also be conducted. She will continue her current medications, including Farxiga and Tradjenta, until further evaluation next week.    2. Right posterior hip contusion.  She fell on a decorative metal object, resulting in soreness in the right posterior hip. A tetanus shot will be administered at [facility].    3. Medication management.  She is currently taking [place] Thyroid and requires a refill. Her insurance will only cover 60 tablets per month unless overridden. A refill for [place] Thyroid will be provided.    Follow-up  The patient will follow up in 1 week.          Follow Up   Return in about 1 week (around 4/21/2025).  Patient was given instructions and counseling regarding her condition or for health maintenance advice. Please see specific information pulled into the AVS if appropriate.         Patient or patient representative verbalized consent for the use of Ambient Listening during the visit with  Gage Colon MD for chart documentation. 4/14/2025  12:37 EDT

## 2025-04-15 LAB
ALBUMIN/CREAT UR: 3 MG/G CREAT (ref 0–29)
BUN SERPL-MCNC: 15 MG/DL (ref 8–27)
BUN/CREAT SERPL: 22 (ref 12–28)
CALCIUM SERPL-MCNC: 9.8 MG/DL (ref 8.7–10.3)
CHLORIDE SERPL-SCNC: 107 MMOL/L (ref 96–106)
CO2 SERPL-SCNC: 17 MMOL/L (ref 20–29)
CREAT SERPL-MCNC: 0.69 MG/DL (ref 0.57–1)
CREAT UR-MCNC: 117.1 MG/DL
EGFRCR SERPLBLD CKD-EPI 2021: 98 ML/MIN/1.73
GLUCOSE SERPL-MCNC: 81 MG/DL (ref 70–99)
HBA1C MFR BLD: 6.5 % (ref 4.8–5.6)
MICROALBUMIN UR-MCNC: 3.5 UG/ML
POTASSIUM SERPL-SCNC: 4 MMOL/L (ref 3.5–5.2)
SODIUM SERPL-SCNC: 140 MMOL/L (ref 134–144)

## 2025-04-21 ENCOUNTER — PRIOR AUTHORIZATION (OUTPATIENT)
Age: 65
End: 2025-04-21
Payer: MEDICARE

## 2025-04-22 ENCOUNTER — OFFICE VISIT (OUTPATIENT)
Age: 65
End: 2025-04-22
Payer: MEDICARE

## 2025-04-22 ENCOUNTER — TELEPHONE (OUTPATIENT)
Age: 65
End: 2025-04-22

## 2025-04-22 VITALS
BODY MASS INDEX: 25.23 KG/M2 | RESPIRATION RATE: 14 BRPM | HEIGHT: 66 IN | WEIGHT: 157 LBS | OXYGEN SATURATION: 99 % | HEART RATE: 74 BPM | TEMPERATURE: 98 F

## 2025-04-22 DIAGNOSIS — L30.8 OTHER ECZEMA: ICD-10-CM

## 2025-04-22 DIAGNOSIS — E11.69 TYPE 2 DIABETES MELLITUS WITH OTHER SPECIFIED COMPLICATION, WITHOUT LONG-TERM CURRENT USE OF INSULIN: Primary | ICD-10-CM

## 2025-04-22 RX ORDER — LANCETS 28 GAUGE
EACH MISCELLANEOUS
Qty: 200 EACH | Refills: 12 | Status: SHIPPED | OUTPATIENT
Start: 2025-04-22

## 2025-04-22 RX ORDER — CLOBETASOL PROPIONATE 0.5 MG/G
1 OINTMENT TOPICAL 2 TIMES DAILY
Qty: 30 G | Refills: 0 | Status: SHIPPED | OUTPATIENT
Start: 2025-04-22

## 2025-04-22 NOTE — PROGRESS NOTES
"Chief Complaint  review labs and Diabetes    Subjective        Kaitlin Alvarez presents to [facility]  Diabetes        History of Present Illness  The patient presents for evaluation of type 2 diabetes and a skin lesion.    A significant decrease in hemoglobin A1c from 9.1 to 6.5 is reported. Current medication regimen includes metformin and Farxiga, which have contributed to the improved blood glucose levels. Kidney function and cholesterol levels are noted to be normal, with no protein detected in the urine.    A persistent itch at the site of a previous injury caused by metal contact is reported. A tetanus injection was received, but the wound remains unhealed. The patient is uncertain if the itching is a symptom of an infection or a reaction to the tetanus injection. The size of the wound has reduced since the administration of the tetanus injection.    Consideration is being given to receiving the pneumonia vaccine.       Objective   Vital Signs:  Pulse 74   Temp 98 °F (36.7 °C) (Temporal)   Resp 14   Ht 168 cm (66.14\")   Wt 71.2 kg (157 lb)   SpO2 99%   BMI 25.23 kg/m²   Estimated body mass index is 25.23 kg/m² as calculated from the following:    Height as of this encounter: 168 cm (66.14\").    Weight as of this encounter: 71.2 kg (157 lb).    BMI is >= 25 and <30. (Overweight) The following options were offered after discussion;: weight loss educational material (shared in after visit summary), exercise counseling/recommendations, and nutrition counseling/recommendations       Physical Exam  Constitutional:       General: She is not in acute distress.     Appearance: Normal appearance. She is not ill-appearing, toxic-appearing or diaphoretic.   HENT:      Head: Normocephalic and atraumatic.      Right Ear: There is no impacted cerumen.      Left Ear: There is no impacted cerumen.      Nose: No congestion or rhinorrhea.      Mouth/Throat:      Pharynx: Oropharynx is " clear. No oropharyngeal exudate or posterior oropharyngeal erythema.   Eyes:      General: No scleral icterus.        Right eye: No discharge.         Left eye: No discharge.      Extraocular Movements: Extraocular movements intact.      Conjunctiva/sclera: Conjunctivae normal.      Pupils: Pupils are equal, round, and reactive to light.   Cardiovascular:      Rate and Rhythm: Normal rate and regular rhythm.      Pulses: Normal pulses.      Heart sounds: Normal heart sounds.   Pulmonary:      Effort: Pulmonary effort is normal.      Breath sounds: Normal breath sounds.   Abdominal:      General: Abdomen is flat. Bowel sounds are normal.      Palpations: Abdomen is soft.   Musculoskeletal:         General: Normal range of motion.      Cervical back: Normal range of motion and neck supple.   Skin:     General: Skin is warm.   Neurological:      General: No focal deficit present.      Mental Status: She is alert and oriented to person, place, and time. Mental status is at baseline.   Psychiatric:         Mood and Affect: Mood normal.         Behavior: Behavior normal.         Thought Content: Thought content normal.         Judgment: Judgment normal.             Skin: No signs of infection noted on the skin. Patient has a healing wound from a tetanus shot.       Result Review :    Results  Labs   - Hemoglobin A1c: Decreased from 9.1 to 6.5   - Iron level: 11/2024, 43.8   - Kidney function: Normal   - Cholesterol: Normal              Assessment and Plan   Diagnoses and all orders for this visit:    1. Type 2 diabetes mellitus with other specified complication, without long-term current use of insulin (Primary)    2. Other eczema  -     clobetasol (TEMOVATE) 0.05 % ointment; Apply 1 Application topically to the appropriate area as directed 2 (Two) Times a Day.  Dispense: 30 g; Refill: 0        Assessment & Plan  1. Type 2 diabetes mellitus.  - Hemoglobin A1c has improved from 9.1 to 6.5, indicating effective management  of blood glucose levels.  - Kidney function is normal, and there is no protein in the urine.  - Advised to continue current regimen of metformin and Farxiga.  - Follow-up appointment scheduled for 3 months to reassess the condition.    2. Skin lesion.  - Reports itching at the site of a previous injury caused by metal contact.  - The lesion is not infected.  - Prescribed an ointment for topical application twice daily for one week.    3. Health maintenance.  - Considering receiving the pneumonia vaccine.  - Follow-up in 3 months unless other issues arise.          Follow Up   No follow-ups on file.  Patient was given instructions and counseling regarding her condition or for health maintenance advice. Please see specific information pulled into the AVS if appropriate.         Patient or patient representative verbalized consent for the use of Ambient Listening during the visit with  Gage Colon MD for chart documentation. 4/22/2025  13:55 EDT

## 2025-04-22 NOTE — TELEPHONE ENCOUNTER
Caller: [facility] - [facility] [address] [place] [address] Rd. - [phone] PH - [phone] FX    Relationship: Pharmacy    Best call back number: [phone]    Who are you requesting to speak with (clinical staff, provider,  specific staff member): CLINICAL STAFF    Do you know the name of the person who called: BEKAH    What was the call regarding: STATED THAT THEY NEED TO DISCUSS THE PRESCRIPTION THEY RECEIVED FOR THE LANCETS AS THEY DO NOT HAVE THE FREESTYLE IN STOCK BUT THEY DO HAVE THE ACCU-CHEK SOFTCLIX AND HAD TO GIVE THE PATIENT THE ACCU-CHECK METER AND TEST STRIPS. PLEASE CALL AND ADVISE

## 2025-04-22 NOTE — TELEPHONE ENCOUNTER
You asked for the continuous glucose monitor (CGM) system above for your Type 2 diabetes. The Medicare rule in the Medicare Benefit Policy Manual (Chapter 15, Section 50.4) says a medical device is covered under the Part B (medical) benefit when the use is reasonable and necessary for treating the individual patient. We looked at the major drug guides, medical journals, and standard medical practices to decide if the use is accepted (reasonable and necessary). We also looked at LCD - Glucose Monitors (E88325) (cms.gov). The accepted use of this medical device for your condition requires that you meet one of the following: requires treatment with insulin; OR has a documented history of problematic hypoglycemia with documentation of one of the following: More than one level 2 hypoglycemic event (defined as glucose less than 54mg/dL (3.0mmol/L)) that persists despite more than one attempt to adjust medication therapy or modification of diabetes treatment plan OR History of one level 3 hypoglycemic event (glucose less than 54mg/dL (3.0mmol/L)) characterized by altered mental or physical state requiring hypoglycemia treatment. You do not meet these requirements. Please ask your provider to send us new information to show you meet these requirements or tell us why they should not apply to you.

## 2025-05-01 ENCOUNTER — PRIOR AUTHORIZATION (OUTPATIENT)
Age: 65
End: 2025-05-01
Payer: MEDICARE

## 2025-05-01 NOTE — TELEPHONE ENCOUNTER
Approved today by [facility] 2017  [place] Case: [id number], Status: Approved, Coverage Starts on: 1/1/2025 12:00:00 AM, Coverage Ends on: 12/31/2025 12:00:00 AM. Questions? Contact [phone].  Effective Date: 1/1/2025  Authorization Expiration Date: 12/31/2025

## 2025-05-27 ENCOUNTER — TELEPHONE (OUTPATIENT)
Age: 65
End: 2025-05-27
Payer: MEDICARE

## 2025-05-27 DIAGNOSIS — E11.65 TYPE 2 DIABETES MELLITUS WITH HYPERGLYCEMIA, WITHOUT LONG-TERM CURRENT USE OF INSULIN: Primary | ICD-10-CM

## 2025-05-27 RX ORDER — PROCHLORPERAZINE 25 MG/1
SUPPOSITORY RECTAL
Qty: 3 EACH | Refills: 3 | Status: SHIPPED | OUTPATIENT
Start: 2025-05-27

## 2025-05-27 NOTE — TELEPHONE ENCOUNTER
Caller: Kaitlin Alvarez    Relationship: Self    Best call back number: [phone]     What medication are you requesting: DEXCOM G6         If a prescription is needed, what is your preferred pharmacy and phone number: [id number] - [facility] [address] [facility] [place] [address] [place] [address] [address] - [phone]  - [phone] FX     Additional notes: STATES SHE IS NOT WANTING TO HAVE TO PRICK HER SELF SO MUCH AND WOULD LIKE TO TRY THE DEXCOM G6 PLEASE CALL AND ADVISE

## 2025-06-02 ENCOUNTER — OFFICE VISIT (OUTPATIENT)
Age: 65
End: 2025-06-02
Payer: MEDICARE

## 2025-06-02 VITALS — BODY MASS INDEX: 25.23 KG/M2 | RESPIRATION RATE: 14 BRPM | WEIGHT: 157 LBS | HEIGHT: 66 IN

## 2025-06-02 DIAGNOSIS — R30.0 DYSURIA: Primary | ICD-10-CM

## 2025-06-02 DIAGNOSIS — E03.8 OTHER SPECIFIED HYPOTHYROIDISM: ICD-10-CM

## 2025-06-02 LAB
BILIRUB BLD-MCNC: NEGATIVE MG/DL
CLARITY, POC: ABNORMAL
COLOR UR: ABNORMAL
EXPIRATION DATE: ABNORMAL
GLUCOSE UR STRIP-MCNC: ABNORMAL MG/DL
KETONES UR QL: NEGATIVE
LEUKOCYTE EST, POC: ABNORMAL
Lab: ABNORMAL
NITRITE UR-MCNC: NEGATIVE MG/ML
PH UR: 5.5 [PH] (ref 5–8)
PROT UR STRIP-MCNC: NEGATIVE MG/DL
RBC # UR STRIP: ABNORMAL /UL
SP GR UR: 1.02 (ref 1–1.03)
UROBILINOGEN UR QL: ABNORMAL

## 2025-06-02 PROCEDURE — 99213 OFFICE O/P EST LOW 20 MIN: CPT | Performed by: FAMILY MEDICINE

## 2025-06-02 PROCEDURE — 3044F HG A1C LEVEL LT 7.0%: CPT | Performed by: FAMILY MEDICINE

## 2025-06-02 PROCEDURE — 81003 URINALYSIS AUTO W/O SCOPE: CPT | Performed by: FAMILY MEDICINE

## 2025-06-02 PROCEDURE — 1125F AMNT PAIN NOTED PAIN PRSNT: CPT | Performed by: FAMILY MEDICINE

## 2025-06-02 RX ORDER — CIPROFLOXACIN 250 MG/1
250 TABLET, FILM COATED ORAL 2 TIMES DAILY
Qty: 14 TABLET | Refills: 0 | Status: SHIPPED | OUTPATIENT
Start: 2025-06-02 | End: 2025-06-03 | Stop reason: SDUPTHER

## 2025-06-02 RX ORDER — LEVOTHYROXINE SODIUM 100 UG/1
100 TABLET ORAL
Qty: 90 TABLET | Refills: 3 | Status: SHIPPED | OUTPATIENT
Start: 2025-06-02 | End: 2025-06-03 | Stop reason: SDUPTHER

## 2025-06-02 NOTE — PROGRESS NOTES
"Chief Complaint  Urinary Tract Infection and Hypothyroidism    Subjective        Kaitlin Alvarez presents to [facility]  Urinary Tract Infection  Hypothyroidism      History of Present Illness         Objective   Vital Signs:  Resp 14   Ht 168 cm (66.14\")   Wt 71.2 kg (157 lb)   BMI 25.23 kg/m²   Estimated body mass index is 25.23 kg/m² as calculated from the following:    Height as of this encounter: 168 cm (66.14\").    Weight as of this encounter: 71.2 kg (157 lb).            Physical Exam  Constitutional:       General: She is not in acute distress.     Appearance: Normal appearance. She is not ill-appearing, toxic-appearing or diaphoretic.   HENT:      Head: Normocephalic and atraumatic.      Right Ear: There is no impacted cerumen.      Left Ear: There is no impacted cerumen.      Nose: No congestion or rhinorrhea.      Mouth/Throat:      Pharynx: Oropharynx is clear. No oropharyngeal exudate or posterior oropharyngeal erythema.   Eyes:      General: No scleral icterus.        Right eye: No discharge.         Left eye: No discharge.      Extraocular Movements: Extraocular movements intact.      Conjunctiva/sclera: Conjunctivae normal.      Pupils: Pupils are equal, round, and reactive to light.   Cardiovascular:      Rate and Rhythm: Normal rate and regular rhythm.      Pulses: Normal pulses.      Heart sounds: Normal heart sounds.   Pulmonary:      Effort: Pulmonary effort is normal.      Breath sounds: Normal breath sounds.   Abdominal:      General: Abdomen is flat. Bowel sounds are normal.      Palpations: Abdomen is soft.   Musculoskeletal:         General: Normal range of motion.      Cervical back: Normal range of motion and neck supple.   Skin:     General: Skin is warm.   Neurological:      General: No focal deficit present.      Mental Status: She is alert and oriented to person, place, and time. Mental status is at baseline.   Psychiatric:         Mood and Affect: " Mood normal.         Behavior: Behavior normal.         Thought Content: Thought content normal.         Judgment: Judgment normal.                  Result Review :    Results                Assessment and Plan   Diagnoses and all orders for this visit:    1. Dysuria (Primary)  -     POCT urinalysis dipstick, automated  -     Urine Culture - Urine, Urine, Clean Catch  -     ciprofloxacin (Cipro) 250 MG tablet; Take 1 tablet by mouth 2 (Two) Times a Day.  Dispense: 14 tablet; Refill: 0    2. Other specified hypothyroidism  -     levothyroxine (Synthroid) 100 MCG tablet; Take 1 tablet by mouth Every Morning.  Dispense: 90 tablet; Refill: 3        Assessment & Plan            Follow Up   No follow-ups on file.  Patient was given instructions and counseling regarding her condition or for health maintenance advice. Please see specific information pulled into the AVS if appropriate.         Patient or patient representative verbalized consent for the use of Ambient Listening during the visit with  Gage Colon MD for chart documentation. 6/2/2025  14:54 EDT

## 2025-06-03 ENCOUNTER — TELEPHONE (OUTPATIENT)
Age: 65
End: 2025-06-03
Payer: MEDICARE

## 2025-06-03 DIAGNOSIS — R30.0 DYSURIA: ICD-10-CM

## 2025-06-03 DIAGNOSIS — E03.8 OTHER SPECIFIED HYPOTHYROIDISM: ICD-10-CM

## 2025-06-03 RX ORDER — CIPROFLOXACIN 250 MG/1
250 TABLET, FILM COATED ORAL 2 TIMES DAILY
Qty: 14 TABLET | Refills: 0 | Status: SHIPPED | OUTPATIENT
Start: 2025-06-03

## 2025-06-03 RX ORDER — LEVOTHYROXINE SODIUM 100 UG/1
100 TABLET ORAL
Qty: 90 TABLET | Refills: 3 | Status: SHIPPED | OUTPATIENT
Start: 2025-06-03

## 2025-06-03 NOTE — TELEPHONE ENCOUNTER
Caller: Kaitlin Alvarez    Relationship: Self    Best call back number: [phone]     Requested Prescriptions:   Requested Prescriptions     Pending Prescriptions Disp Refills    ciprofloxacin (Cipro) 250 MG tablet 14 tablet 0     Sig: Take 1 tablet by mouth 2 (Two) Times a Day.        Pharmacy where request should be sent: [facility] - [address] - [phone] PH - [phone] FX     Last office visit with prescribing clinician: 6/2/2025   Last telemedicine visit with prescribing clinician: Visit date not found   Next office visit with prescribing clinician: 7/8/2025     Additional details provided by patient: WAS SENT TO MAIL ORDER NEEDS TO GO TO LOCAL PHARMACY     Does the patient have less than a 3 day supply:  [x] Yes  [] No    Would you like a call back once the refill request has been completed: [] Yes [x] No    If the office needs to give you a call back, can they leave a voicemail: [] Yes [x] No    Soham Martinez   06/03/25 09:31 EDT

## 2025-06-03 NOTE — TELEPHONE ENCOUNTER
Caller: Kaitlin Alvarez    Relationship: Self    Best call back number: [phone]    What medication are you requesting:     levothyroxine (Synthroid) 100 MCG tablet  100 mcg, Every Early Morning       What are your current symptoms:         Have you had these symptoms before:    [x] Yes  [] No    Have you been treated for these symptoms before:   [x] Yes  [] No    If a prescription is needed, what is your preferred pharmacy and phone number: [id number] - [facility] [address] [address] [address] [address] [address] [address] - [phone] [facility] [phone] FX     Additional notes:    WILL NEED FILLED LOCALLY , NOT MAIL ORDER

## 2025-06-04 ENCOUNTER — TELEPHONE (OUTPATIENT)
Age: 65
End: 2025-06-04
Payer: MEDICARE

## 2025-06-04 LAB
BACTERIA UR CULT: NORMAL
BACTERIA UR CULT: NORMAL

## 2025-06-04 NOTE — TELEPHONE ENCOUNTER
Caller: [employer]    Relationship to patient: Other      Best call back number: [phone]    Provider: DR. MURRAY      Medication PA needed: DEXCOM G6     Reason for call/Prior Auth: INSURANCE

## 2025-06-09 DIAGNOSIS — E11.65 TYPE 2 DIABETES MELLITUS WITH HYPERGLYCEMIA, WITHOUT LONG-TERM CURRENT USE OF INSULIN: ICD-10-CM

## 2025-06-09 RX ORDER — PROCHLORPERAZINE 25 MG/1
SUPPOSITORY RECTAL
Qty: 3 EACH | Refills: 3 | Status: SHIPPED | OUTPATIENT
Start: 2025-06-09

## 2025-06-10 DIAGNOSIS — E11.65 TYPE 2 DIABETES MELLITUS WITH HYPERGLYCEMIA, WITHOUT LONG-TERM CURRENT USE OF INSULIN: ICD-10-CM

## 2025-06-10 DIAGNOSIS — E11.9 TYPE 2 DIABETES MELLITUS WITHOUT COMPLICATION, WITHOUT LONG-TERM CURRENT USE OF INSULIN: ICD-10-CM

## 2025-06-10 RX ORDER — DAPAGLIFLOZIN 10 MG/1
10 TABLET, FILM COATED ORAL DAILY
Qty: 90 TABLET | Refills: 3 | Status: SHIPPED | OUTPATIENT
Start: 2025-06-10

## 2025-06-11 NOTE — TELEPHONE ENCOUNTER
DANIS REID IS CALLING NEEDING ICD CODE (MEDICAL QUESTIONS) THAT NEED TO BE ANSWERED ABOUT 13 QUESTIONS. IN REGARDS TO PRIOR AUTHORIZATION FOR DEXCOM SENSOR.     THE QUICKEST WAY IS TO CALL BACK THIS IS TIME SENSITIVE AND IS DUE IN 9 HOURS OR WILL BE DENIED.    REFERENCE NUMBER IS: [id number]

## 2025-06-23 RX ORDER — TOPIRAMATE 50 MG/1
50 TABLET, FILM COATED ORAL 2 TIMES DAILY
Qty: 180 TABLET | Refills: 3 | Status: SHIPPED | OUTPATIENT
Start: 2025-06-23

## 2025-06-23 RX ORDER — ATORVASTATIN CALCIUM 40 MG/1
40 TABLET, FILM COATED ORAL DAILY
Qty: 90 TABLET | Refills: 3 | Status: SHIPPED | OUTPATIENT
Start: 2025-06-23

## 2025-06-23 NOTE — TELEPHONE ENCOUNTER
Caller: Kaitlin Alvarez    Relationship: Self    Best call back number: [phone]    Requested Prescriptions:   Requested Prescriptions     Pending Prescriptions Disp Refills    topiramate (TOPAMAX) 50 MG tablet 180 tablet 3     Sig: Take 1 tablet by mouth 2 (Two) Times a Day.    atorvastatin (LIPITOR) 40 MG tablet 90 tablet 3     Sig: Take 1 tablet by mouth Daily.        [facility] [address] - [phone]  - [phone] FX     Last office visit with prescribing clinician: 6/2/2025   Last telemedicine visit with prescribing clinician: Visit date not found   Next office visit with prescribing clinician: 7/8/2025     Does the patient have less than a 3 day supply:  [x] Yes  [] No    Soham Zamorano Rep   06/23/25 09:21 EDT

## 2025-06-27 DIAGNOSIS — E03.8 OTHER SPECIFIED HYPOTHYROIDISM: ICD-10-CM

## 2025-06-27 RX ORDER — LEVOTHYROXINE SODIUM 100 UG/1
100 TABLET ORAL
Qty: 90 TABLET | Refills: 0 | Status: SHIPPED | OUTPATIENT
Start: 2025-06-27

## 2025-07-01 RX ORDER — ATORVASTATIN CALCIUM 40 MG/1
40 TABLET, FILM COATED ORAL DAILY
Qty: 90 TABLET | Refills: 3 | Status: SHIPPED | OUTPATIENT
Start: 2025-07-01

## 2025-07-01 RX ORDER — MIRABEGRON 50 MG/1
50 TABLET, FILM COATED, EXTENDED RELEASE ORAL DAILY
Qty: 90 TABLET | Refills: 3 | Status: SHIPPED | OUTPATIENT
Start: 2025-07-01

## 2025-07-01 NOTE — TELEPHONE ENCOUNTER
Caller: [facility] - [place], [place] - [address] - [phone] [facility] [phone] FX    Relationship: Pharmacy    Best call back number: [phone]     Requested Prescriptions:   Requested Prescriptions     Pending Prescriptions Disp Refills    atorvastatin (LIPITOR) 40 MG tablet 90 tablet 3     Sig: Take 1 tablet by mouth Daily.    linagliptin (Tradjenta) 5 MG tablet tablet 90 tablet 4     Sig: Take 1 tablet by mouth Daily.    Mirabegron ER (Myrbetriq) 50 MG tablet sustained-release 24 hour 24 hr tablet 90 tablet 3     Sig: Take 50 mg by mouth Daily.        Pharmacy where request should be sent: [facility] - [place], [place] - [address] [facility] [address] - [phone] [facility] [phone] FX     Last office visit with prescribing clinician: 6/2/2025   Last telemedicine visit with prescribing clinician: Visit date not found   Next office visit with prescribing clinician: 7/8/2025     Additional details provided by patient: BEEN TRYING TO GET REFILL FOR 2 WEEKS.      Does the patient have less than a 3 day supply:  [x] Yes  [] No    Would you like a call back once the refill request has been completed: [] Yes [] No    If the office needs to give you a call back, can they leave a voicemail: [] Yes [] No    Soham Swann Rep   07/01/25 09:50 EDT

## 2025-07-03 RX ORDER — METFORMIN HYDROCHLORIDE 500 MG/1
1000 TABLET, EXTENDED RELEASE ORAL 2 TIMES DAILY
Qty: 360 TABLET | Refills: 3 | Status: SHIPPED | OUTPATIENT
Start: 2025-07-03

## 2025-07-03 NOTE — TELEPHONE ENCOUNTER
Caller: Kaitlin Alvarez    Relationship: Self    Best call back number:     Requested Prescriptions:   Requested Prescriptions     Pending Prescriptions Disp Refills    metFORMIN ER (GLUCOPHAGE-XR) 500 MG 24 hr tablet 360 tablet 3     Sig: Take 2 tablets by mouth 2 (Two) Times a Day.        [facility] [address] - [phone]  - [phone] FX     Last office visit with prescribing clinician: 6/2/2025   Last telemedicine visit with prescribing clinician: Visit date not found   Next office visit with prescribing clinician: 7/8/2025     Additional details provided by patient:     Does the patient have less than a 3 day supply:  [x] Yes  [] No    Soham Daly Rep   07/03/25 12:02 EDT

## 2025-07-08 ENCOUNTER — OFFICE VISIT (OUTPATIENT)
Age: 65
End: 2025-07-08
Payer: MEDICARE

## 2025-07-08 VITALS
BODY MASS INDEX: 23.3 KG/M2 | HEIGHT: 66 IN | HEART RATE: 73 BPM | DIASTOLIC BLOOD PRESSURE: 70 MMHG | TEMPERATURE: 97.2 F | RESPIRATION RATE: 14 BRPM | SYSTOLIC BLOOD PRESSURE: 124 MMHG | WEIGHT: 145 LBS | OXYGEN SATURATION: 97 %

## 2025-07-08 DIAGNOSIS — I69.30 PERSONAL HISTORY OF STROKE WITH CURRENT RESIDUAL EFFECTS: ICD-10-CM

## 2025-07-08 DIAGNOSIS — E11.9 TYPE 2 DIABETES MELLITUS WITHOUT COMPLICATION, WITHOUT LONG-TERM CURRENT USE OF INSULIN: Primary | ICD-10-CM

## 2025-07-08 DIAGNOSIS — E83.110 HEREDITARY HEMOCHROMATOSIS: ICD-10-CM

## 2025-07-08 DIAGNOSIS — Z91.89 ATHEROSCLEROTIC CARDIOVASCULAR DISEASE 10 YEAR RISK GREATER THAN 30% USING 2013 ACC/AHA CALCULATOR: ICD-10-CM

## 2025-07-08 DIAGNOSIS — Z76.0 REPEAT PRESCRIPTION ISSUE: ICD-10-CM

## 2025-07-08 DIAGNOSIS — Z91.89 CARDIOVASCULAR EVENT RISK: ICD-10-CM

## 2025-07-08 DIAGNOSIS — K22.70 BARRETT'S ESOPHAGUS WITHOUT DYSPLASIA: ICD-10-CM

## 2025-07-08 DIAGNOSIS — E03.9 HYPOTHYROIDISM (ACQUIRED): ICD-10-CM

## 2025-07-08 DIAGNOSIS — I20.9 ANGINA PECTORIS: ICD-10-CM

## 2025-07-08 PROBLEM — E11.49 TYPE 2 DIABETES MELLITUS WITH OTHER DIABETIC NEUROLOGICAL COMPLICATION: Status: ACTIVE | Noted: 2025-07-08

## 2025-07-08 PROBLEM — K21.00 GASTROESOPHAGEAL REFLUX DISEASE WITH ESOPHAGITIS WITHOUT HEMORRHAGE: Status: ACTIVE | Noted: 2025-07-08

## 2025-07-08 PROCEDURE — 1125F AMNT PAIN NOTED PAIN PRSNT: CPT | Performed by: FAMILY MEDICINE

## 2025-07-08 PROCEDURE — 99214 OFFICE O/P EST MOD 30 MIN: CPT | Performed by: FAMILY MEDICINE

## 2025-07-08 PROCEDURE — G2211 COMPLEX E/M VISIT ADD ON: HCPCS | Performed by: FAMILY MEDICINE

## 2025-07-08 PROCEDURE — 3044F HG A1C LEVEL LT 7.0%: CPT | Performed by: FAMILY MEDICINE

## 2025-07-08 RX ORDER — DIPHENHYDRAMINE HYDROCHLORIDE 25 MG/1
CAPSULE, LIQUID FILLED ORAL
Qty: 1 EACH | Refills: 0 | Status: SHIPPED | OUTPATIENT
Start: 2025-07-08

## 2025-07-08 RX ORDER — LANCETS 28 GAUGE
EACH MISCELLANEOUS
Qty: 200 EACH | Refills: 12 | Status: SHIPPED | OUTPATIENT
Start: 2025-07-08

## 2025-07-08 RX ORDER — ESOMEPRAZOLE MAGNESIUM 40 MG/1
40 CAPSULE, DELAYED RELEASE ORAL DAILY
Qty: 90 CAPSULE | Refills: 3 | Status: SHIPPED | OUTPATIENT
Start: 2025-07-08

## 2025-07-08 NOTE — PROGRESS NOTES
"Chief Complaint  Hypothyroidism    Subjective        Kaitlin Alvarez presents to [facility]  Hypothyroidism      History of Present Illness  The patient presents for evaluation of diabetes, thyroid disorder, and chest pain.    She reports that her hemoglobin A1c level has decreased to 6.5. She has not had her iron levels checked recently. Her thyroid medication was adjusted to a lower dose, and she is currently taking levothyroxine 100 mcg. She expresses a desire to have her thyroid levels rechecked.    She experiences chest pain, which has led to multiple emergency room visits. She also mentions a history of stroke. There is a family history of heart disease, with her brother having recently undergone a triple bypass and her mother having  of heart trouble at the age of 82. She expresses concern about her own risk of heart disease.    She rates her pain level at 8 out of 10. She is unable to use her arm due to the pain, which is constant and worsening. Her sleep is disrupted because of the pain. She has undergone surgery on both arms in the past, but the condition has deteriorated since then.    PAST SURGICAL HISTORY:  - Surgery on both arms (dates not specified)    SOCIAL HISTORY  She does not smoke.    FAMILY HISTORY  Her brother had a triple bypass surgery and a liver transplant. Her mother had a triple bypass surgery and  of heart trouble at the age of 82. There is a family history of cancer.       Objective   Vital Signs:  /70 (BP Location: Left arm, Patient Position: Sitting, Cuff Size: Adult)   Pulse 73   Temp 97.2 °F (36.2 °C) (Temporal)   Resp 14   Ht 168 cm (66.14\")   Wt 65.8 kg (145 lb)   SpO2 97%   BMI 23.30 kg/m²   Estimated body mass index is 23.3 kg/m² as calculated from the following:    Height as of this encounter: 168 cm (66.14\").    Weight as of this encounter: 65.8 kg (145 lb).    BMI is within normal parameters. No other follow-up for BMI " required.       Physical Exam  Constitutional:       General: She is not in acute distress.     Appearance: Normal appearance. She is not ill-appearing, toxic-appearing or diaphoretic.   HENT:      Head: Normocephalic and atraumatic.      Right Ear: There is no impacted cerumen.      Left Ear: There is no impacted cerumen.      Nose: No congestion or rhinorrhea.      Mouth/Throat:      Pharynx: Oropharynx is clear. No oropharyngeal exudate or posterior oropharyngeal erythema.   Eyes:      General: No scleral icterus.        Right eye: No discharge.         Left eye: No discharge.      Extraocular Movements: Extraocular movements intact.      Conjunctiva/sclera: Conjunctivae normal.      Pupils: Pupils are equal, round, and reactive to light.   Cardiovascular:      Rate and Rhythm: Normal rate and regular rhythm.      Pulses: Normal pulses.      Heart sounds: Normal heart sounds.   Pulmonary:      Effort: Pulmonary effort is normal.      Breath sounds: Normal breath sounds.   Abdominal:      General: Abdomen is flat. Bowel sounds are normal.      Palpations: Abdomen is soft.   Musculoskeletal:         General: Normal range of motion.      Cervical back: Normal range of motion and neck supple.   Skin:     General: Skin is warm.   Neurological:      General: No focal deficit present.      Mental Status: She is alert and oriented to person, place, and time. Mental status is at baseline.   Psychiatric:         Mood and Affect: Mood normal.         Behavior: Behavior normal.         Thought Content: Thought content normal.         Judgment: Judgment normal.           Cardiovascular: Regular rate and rhythm, no murmurs, rubs, or gallops       Result Review :    Results  Labs   - Hemoglobin A1c: 6.5              Assessment and Plan   Diagnoses and all orders for this visit:    1. Type 2 diabetes mellitus without complication, without long-term current use of insulin (Primary)  -     Blood Glucose Monitoring Suppl (Blood  Glucose Monitor System) w/Device kit; Check Blood Glucose BID DX e11.9  Dispense: 1 each; Refill: 0  -     Lancets (freestyle) lancets; Check Blood Glucose BID DX e11.9  Dispense: 200 each; Refill: 12  -     glucose blood test strip; Check Blood Glucose BID DX e11.9  Dispense: 200 each; Refill: 12  -     Basic Metabolic Panel    2. Repeat prescription issue  -     esomeprazole (nexIUM) 40 MG capsule; Take 1 capsule by mouth Daily.  Dispense: 90 capsule; Refill: 3    3. Hereditary hemochromatosis  -     Ferritin    4. Jensen's esophagus without dysplasia    5. Hypothyroidism (acquired)  -     TSH+Free T4; Future        Assessment & Plan  1. Diabetes.  - Last hemoglobin A1c was 6.5, indicating good control.  - No recheck needed at this time.    2. Thyroid disorder.  - Currently taking levothyroxine 100 mcg.  - Thyroid levels will be rechecked today.    3. Chest pain.  - Family history of heart disease and frequent ER visits for chest pain.  - CT calcium score recommended to rule out potential heart disease.  - Referral to [facility] for CT calcium score; if abnormalities are found, referral to a cardiologist will be considered.    4. Pain management.  - Reports pain level of 8 and difficulty using her arm.  - Advised to consider specialized clinics like [facility] or [facility] for further evaluation and management.    5. Iron levels.  - Iron levels have not been checked recently.  - Blood test will be conducted today to assess iron levels.     Will do CT calcium score     Follow Up   No follow-ups on file.  Patient was given instructions and counseling regarding her condition or for health maintenance advice. Please see specific information pulled into the AVS if appropriate.         Patient or patient representative verbalized consent for the use of Ambient Listening during the visit with  Gage Colon MD for chart documentation. 7/8/2025  13:29 EDT

## 2025-07-09 LAB
BUN SERPL-MCNC: 15 MG/DL (ref 8–27)
BUN/CREAT SERPL: 19 (ref 12–28)
CALCIUM SERPL-MCNC: 9.5 MG/DL (ref 8.7–10.3)
CHLORIDE SERPL-SCNC: 105 MMOL/L (ref 96–106)
CO2 SERPL-SCNC: 19 MMOL/L (ref 20–29)
CREAT SERPL-MCNC: 0.77 MG/DL (ref 0.57–1)
EGFRCR SERPLBLD CKD-EPI 2021: 87 ML/MIN/1.73
FERRITIN SERPL-MCNC: 12 NG/ML (ref 15–150)
GLUCOSE SERPL-MCNC: 80 MG/DL (ref 70–99)
POTASSIUM SERPL-SCNC: 4 MMOL/L (ref 3.5–5.2)
SODIUM SERPL-SCNC: 141 MMOL/L (ref 134–144)

## 2025-08-04 ENCOUNTER — OFFICE VISIT (OUTPATIENT)
Age: 65
End: 2025-08-04
Payer: MEDICARE

## 2025-08-04 VITALS
WEIGHT: 145 LBS | HEART RATE: 80 BPM | DIASTOLIC BLOOD PRESSURE: 70 MMHG | BODY MASS INDEX: 23.3 KG/M2 | HEIGHT: 66 IN | TEMPERATURE: 97.4 F | OXYGEN SATURATION: 98 % | SYSTOLIC BLOOD PRESSURE: 110 MMHG

## 2025-08-04 DIAGNOSIS — N95.0 POST-MENOPAUSAL BLEEDING: ICD-10-CM

## 2025-08-04 DIAGNOSIS — R79.0 LOW FERRITIN LEVEL: ICD-10-CM

## 2025-08-04 DIAGNOSIS — E83.110 HEREDITARY HEMOCHROMATOSIS: ICD-10-CM

## 2025-08-04 DIAGNOSIS — I25.10 CORONARY ARTERIOSCLEROSIS: ICD-10-CM

## 2025-08-04 DIAGNOSIS — D50.8 OTHER IRON DEFICIENCY ANEMIA: Primary | ICD-10-CM

## 2025-08-04 DIAGNOSIS — R13.12 OROPHARYNGEAL DYSPHAGIA: ICD-10-CM

## 2025-08-04 PROCEDURE — 3044F HG A1C LEVEL LT 7.0%: CPT | Performed by: FAMILY MEDICINE

## 2025-08-04 PROCEDURE — 1125F AMNT PAIN NOTED PAIN PRSNT: CPT | Performed by: FAMILY MEDICINE

## 2025-08-04 PROCEDURE — G2211 COMPLEX E/M VISIT ADD ON: HCPCS | Performed by: FAMILY MEDICINE

## 2025-08-04 PROCEDURE — 99214 OFFICE O/P EST MOD 30 MIN: CPT | Performed by: FAMILY MEDICINE

## 2025-08-05 LAB
ALBUMIN SERPL-MCNC: 4.6 G/DL (ref 3.9–4.9)
ALP SERPL-CCNC: 128 IU/L (ref 44–121)
ALT SERPL-CCNC: 29 IU/L (ref 0–32)
AST SERPL-CCNC: 25 IU/L (ref 0–40)
BASOPHILS # BLD AUTO: 0 X10E3/UL (ref 0–0.2)
BASOPHILS NFR BLD AUTO: 1 %
BILIRUB DIRECT SERPL-MCNC: 0.12 MG/DL (ref 0–0.4)
BILIRUB SERPL-MCNC: 0.3 MG/DL (ref 0–1.2)
CHOLEST SERPL-MCNC: 143 MG/DL (ref 100–199)
EOSINOPHIL # BLD AUTO: 0.2 X10E3/UL (ref 0–0.4)
EOSINOPHIL NFR BLD AUTO: 4 %
ERYTHROCYTE [DISTWIDTH] IN BLOOD BY AUTOMATED COUNT: 15.1 % (ref 11.7–15.4)
HCT VFR BLD AUTO: 45.8 % (ref 34–46.6)
HDLC SERPL-MCNC: 40 MG/DL
HGB BLD-MCNC: 13.5 G/DL (ref 11.1–15.9)
IMM GRANULOCYTES # BLD AUTO: 0 X10E3/UL (ref 0–0.1)
IMM GRANULOCYTES NFR BLD AUTO: 0 %
IRON SATN MFR SERPL: 9 % (ref 15–55)
IRON SERPL-MCNC: 38 UG/DL (ref 27–139)
LDLC SERPL CALC-MCNC: 81 MG/DL (ref 0–99)
LYMPHOCYTES # BLD AUTO: 1.9 X10E3/UL (ref 0.7–3.1)
LYMPHOCYTES NFR BLD AUTO: 30 %
MCH RBC QN AUTO: 25.7 PG (ref 26.6–33)
MCHC RBC AUTO-ENTMCNC: 29.5 G/DL (ref 31.5–35.7)
MCV RBC AUTO: 87 FL (ref 79–97)
MONOCYTES # BLD AUTO: 0.5 X10E3/UL (ref 0.1–0.9)
MONOCYTES NFR BLD AUTO: 8 %
NEUTROPHILS # BLD AUTO: 3.6 X10E3/UL (ref 1.4–7)
NEUTROPHILS NFR BLD AUTO: 57 %
PLATELET # BLD AUTO: 226 X10E3/UL (ref 150–450)
PROT SERPL-MCNC: 7.3 G/DL (ref 6–8.5)
RBC # BLD AUTO: 5.26 X10E6/UL (ref 3.77–5.28)
T4 FREE SERPL-MCNC: 1.22 NG/DL (ref 0.82–1.77)
TIBC SERPL-MCNC: 415 UG/DL (ref 250–450)
TRIGL SERPL-MCNC: 122 MG/DL (ref 0–149)
TSH SERPL DL<=0.005 MIU/L-ACNC: 1.55 UIU/ML (ref 0.45–4.5)
UIBC SERPL-MCNC: 377 UG/DL (ref 118–369)
VLDLC SERPL CALC-MCNC: 22 MG/DL (ref 5–40)
WBC # BLD AUTO: 6.3 X10E3/UL (ref 3.4–10.8)

## 2025-08-06 ENCOUNTER — TELEPHONE (OUTPATIENT)
Age: 65
End: 2025-08-06
Payer: MEDICARE

## 2025-08-06 DIAGNOSIS — N93.9 VAGINAL SPOTTING: Primary | ICD-10-CM

## 2025-08-07 DIAGNOSIS — N95.0 POSTMENOPAUSAL BLEEDING: Primary | ICD-10-CM

## 2025-08-08 ENCOUNTER — TELEPHONE (OUTPATIENT)
Dept: ONCOLOGY | Facility: CLINIC | Age: 65
End: 2025-08-08
Payer: MEDICARE

## 2025-08-11 ENCOUNTER — HOSPITAL ENCOUNTER (OUTPATIENT)
Dept: ULTRASOUND IMAGING | Facility: HOSPITAL | Age: 65
Discharge: HOME | End: 2025-08-11
Admitting: FAMILY MEDICINE
Payer: MEDICARE

## 2025-08-11 DIAGNOSIS — N95.0 POST-MENOPAUSAL BLEEDING: ICD-10-CM

## 2025-08-11 DIAGNOSIS — N93.9 VAGINAL SPOTTING: ICD-10-CM

## 2025-08-11 PROCEDURE — 76856 US EXAM PELVIC COMPLETE: CPT

## 2025-08-11 PROCEDURE — 76830 TRANSVAGINAL US NON-OB: CPT

## 2025-08-15 ENCOUNTER — LAB (OUTPATIENT)
Dept: OTHER | Facility: HOSPITAL | Age: 65
End: 2025-08-15
Payer: MEDICARE

## 2025-08-15 ENCOUNTER — OFFICE VISIT (OUTPATIENT)
Dept: ONCOLOGY | Facility: CLINIC | Age: 65
End: 2025-08-15
Payer: MEDICARE

## 2025-08-15 ENCOUNTER — TELEPHONE (OUTPATIENT)
Dept: ONCOLOGY | Facility: CLINIC | Age: 65
End: 2025-08-15
Payer: MEDICARE

## 2025-08-15 VITALS
WEIGHT: 143 LBS | SYSTOLIC BLOOD PRESSURE: 101 MMHG | TEMPERATURE: 97.8 F | OXYGEN SATURATION: 99 % | HEART RATE: 69 BPM | RESPIRATION RATE: 16 BRPM | BODY MASS INDEX: 22.98 KG/M2 | DIASTOLIC BLOOD PRESSURE: 64 MMHG | HEIGHT: 66 IN

## 2025-08-15 DIAGNOSIS — D50.9 IRON DEFICIENCY ANEMIA, UNSPECIFIED IRON DEFICIENCY ANEMIA TYPE: ICD-10-CM

## 2025-08-15 DIAGNOSIS — E83.110 HEREDITARY HEMOCHROMATOSIS: Primary | ICD-10-CM

## 2025-08-15 DIAGNOSIS — D68.52 PROTHROMBIN GENE MUTATION: ICD-10-CM

## 2025-08-15 DIAGNOSIS — E83.110 HEREDITARY HEMOCHROMATOSIS: ICD-10-CM

## 2025-08-15 LAB
ALBUMIN SERPL-MCNC: 4.3 G/DL (ref 3.5–5.2)
ALBUMIN/GLOB SERPL: 1.3 G/DL
ALP SERPL-CCNC: 114 U/L (ref 39–117)
ALT SERPL W P-5'-P-CCNC: 21 U/L (ref 1–33)
ANION GAP SERPL CALCULATED.3IONS-SCNC: 9.5 MMOL/L (ref 5–15)
AST SERPL-CCNC: 21 U/L (ref 1–32)
BASOPHILS # BLD AUTO: 0.04 10*3/MM3 (ref 0–0.2)
BASOPHILS NFR BLD AUTO: 0.6 % (ref 0–1.5)
BILIRUB SERPL-MCNC: 0.4 MG/DL (ref 0–1.2)
BUN SERPL-MCNC: 17.1 MG/DL (ref 8–23)
BUN/CREAT SERPL: 24.4 (ref 7–25)
CALCIUM SPEC-SCNC: 9.5 MG/DL (ref 8.6–10.5)
CHLORIDE SERPL-SCNC: 109 MMOL/L (ref 98–107)
CO2 SERPL-SCNC: 23.5 MMOL/L (ref 22–29)
CREAT SERPL-MCNC: 0.7 MG/DL (ref 0.57–1)
DEPRECATED RDW RBC AUTO: 50.5 FL (ref 37–54)
EGFRCR SERPLBLD CKD-EPI 2021: 97.9 ML/MIN/1.73
EOSINOPHIL # BLD AUTO: 0.18 10*3/MM3 (ref 0–0.4)
EOSINOPHIL NFR BLD AUTO: 2.8 % (ref 0.3–6.2)
ERYTHROCYTE [DISTWIDTH] IN BLOOD BY AUTOMATED COUNT: 16.3 % (ref 12.3–15.4)
FERRITIN SERPL-MCNC: 11.9 NG/ML (ref 13–150)
GLOBULIN UR ELPH-MCNC: 3.3 GM/DL
GLUCOSE SERPL-MCNC: 102 MG/DL (ref 65–99)
HCT VFR BLD AUTO: 42.6 % (ref 34–46.6)
HGB BLD-MCNC: 12.9 G/DL (ref 12–15.9)
IMM GRANULOCYTES # BLD AUTO: 0.01 10*3/MM3 (ref 0–0.05)
IMM GRANULOCYTES NFR BLD AUTO: 0.2 % (ref 0–0.5)
IRON 24H UR-MRATE: 46 MCG/DL (ref 37–145)
IRON SATN MFR SERPL: 10 % (ref 20–50)
LYMPHOCYTES # BLD AUTO: 1.83 10*3/MM3 (ref 0.7–3.1)
LYMPHOCYTES NFR BLD AUTO: 28.6 % (ref 19.6–45.3)
MCH RBC QN AUTO: 25.7 PG (ref 26.6–33)
MCHC RBC AUTO-ENTMCNC: 30.3 G/DL (ref 31.5–35.7)
MCV RBC AUTO: 84.9 FL (ref 79–97)
MONOCYTES # BLD AUTO: 0.57 10*3/MM3 (ref 0.1–0.9)
MONOCYTES NFR BLD AUTO: 8.9 % (ref 5–12)
NEUTROPHILS NFR BLD AUTO: 3.77 10*3/MM3 (ref 1.7–7)
NEUTROPHILS NFR BLD AUTO: 58.9 % (ref 42.7–76)
NRBC BLD AUTO-RTO: 0 /100 WBC (ref 0–0.2)
PLATELET # BLD AUTO: 229 10*3/MM3 (ref 140–450)
PMV BLD AUTO: 8.9 FL (ref 6–12)
POTASSIUM SERPL-SCNC: 4 MMOL/L (ref 3.5–5.2)
PROT SERPL-MCNC: 7.6 G/DL (ref 6–8.5)
RBC # BLD AUTO: 5.02 10*6/MM3 (ref 3.77–5.28)
SODIUM SERPL-SCNC: 142 MMOL/L (ref 136–145)
TIBC SERPL-MCNC: 456 MCG/DL (ref 298–536)
TRANSFERRIN SERPL-MCNC: 306 MG/DL (ref 200–360)
WBC NRBC COR # BLD AUTO: 6.4 10*3/MM3 (ref 3.4–10.8)

## 2025-08-15 PROCEDURE — 83540 ASSAY OF IRON: CPT

## 2025-08-15 PROCEDURE — 36415 COLL VENOUS BLD VENIPUNCTURE: CPT

## 2025-08-15 PROCEDURE — 85025 COMPLETE CBC W/AUTO DIFF WBC: CPT

## 2025-08-15 PROCEDURE — 84466 ASSAY OF TRANSFERRIN: CPT

## 2025-08-15 PROCEDURE — 82728 ASSAY OF FERRITIN: CPT

## 2025-08-15 PROCEDURE — 80053 COMPREHEN METABOLIC PANEL: CPT

## 2025-08-15 RX ORDER — FERROUS SULFATE 325(65) MG
325 TABLET ORAL
Qty: 30 TABLET | Refills: 0 | Status: SHIPPED | OUTPATIENT
Start: 2025-08-15

## 2025-08-18 ENCOUNTER — OFFICE VISIT (OUTPATIENT)
Age: 65
End: 2025-08-18
Payer: MEDICARE

## 2025-08-18 VITALS
WEIGHT: 143 LBS | HEIGHT: 66 IN | SYSTOLIC BLOOD PRESSURE: 98 MMHG | TEMPERATURE: 97.3 F | DIASTOLIC BLOOD PRESSURE: 60 MMHG | HEART RATE: 72 BPM | BODY MASS INDEX: 22.98 KG/M2 | OXYGEN SATURATION: 97 %

## 2025-08-18 DIAGNOSIS — R13.10 DYSPHAGIA, UNSPECIFIED TYPE: ICD-10-CM

## 2025-08-18 DIAGNOSIS — K22.2 ESOPHAGEAL STRICTURE: Primary | ICD-10-CM

## 2025-08-18 DIAGNOSIS — K22.70 BARRETT'S ESOPHAGUS WITHOUT DYSPLASIA: ICD-10-CM

## 2025-08-18 DIAGNOSIS — K21.00 GASTROESOPHAGEAL REFLUX DISEASE WITH ESOPHAGITIS WITHOUT HEMORRHAGE: ICD-10-CM

## 2025-08-18 PROCEDURE — 1159F MED LIST DOCD IN RCRD: CPT | Performed by: FAMILY MEDICINE

## 2025-08-18 PROCEDURE — 1125F AMNT PAIN NOTED PAIN PRSNT: CPT | Performed by: FAMILY MEDICINE

## 2025-08-18 PROCEDURE — 1160F RVW MEDS BY RX/DR IN RCRD: CPT | Performed by: FAMILY MEDICINE

## 2025-08-18 PROCEDURE — G2211 COMPLEX E/M VISIT ADD ON: HCPCS | Performed by: FAMILY MEDICINE

## 2025-08-18 PROCEDURE — 99214 OFFICE O/P EST MOD 30 MIN: CPT | Performed by: FAMILY MEDICINE

## 2025-08-18 PROCEDURE — 3044F HG A1C LEVEL LT 7.0%: CPT | Performed by: FAMILY MEDICINE

## 2025-08-19 ENCOUNTER — TELEPHONE (OUTPATIENT)
Age: 65
End: 2025-08-19
Payer: MEDICARE

## 2025-08-20 ENCOUNTER — TELEPHONE (OUTPATIENT)
Age: 65
End: 2025-08-20
Payer: MEDICARE

## 2025-08-21 ENCOUNTER — OFFICE VISIT (OUTPATIENT)
Dept: OBSTETRICS AND GYNECOLOGY | Age: 65
End: 2025-08-21
Payer: MEDICARE

## 2025-08-21 VITALS
WEIGHT: 143 LBS | HEIGHT: 66 IN | SYSTOLIC BLOOD PRESSURE: 108 MMHG | DIASTOLIC BLOOD PRESSURE: 62 MMHG | BODY MASS INDEX: 22.98 KG/M2

## 2025-08-21 DIAGNOSIS — Z01.419 WELL WOMAN EXAM WITH ROUTINE GYNECOLOGICAL EXAM: Primary | ICD-10-CM

## 2025-08-21 DIAGNOSIS — Z78.0 POSTMENOPAUSAL: ICD-10-CM

## 2025-08-21 DIAGNOSIS — N95.0 POSTMENOPAUSAL BLEEDING: ICD-10-CM

## 2025-08-21 DIAGNOSIS — Z12.4 SCREENING FOR CERVICAL CANCER: ICD-10-CM

## 2025-08-21 DIAGNOSIS — Z12.31 ENCOUNTER FOR SCREENING MAMMOGRAM FOR MALIGNANT NEOPLASM OF BREAST: ICD-10-CM

## 2025-08-26 LAB
CYTOLOGIST CVX/VAG CYTO: NORMAL
CYTOLOGY CVX/VAG DOC CYTO: NORMAL
CYTOLOGY CVX/VAG DOC THIN PREP: NORMAL
DX ICD CODE: NORMAL
HPV I/H RISK 4 DNA CVX QL PROBE+SIG AMP: NEGATIVE
Lab: NORMAL
OTHER STN SPEC: NORMAL
SERVICE CMNT-IMP: NORMAL
STAT OF ADQ CVX/VAG CYTO-IMP: NORMAL

## 2025-08-27 ENCOUNTER — OFFICE VISIT (OUTPATIENT)
Age: 65
End: 2025-08-27
Payer: MEDICARE

## 2025-08-27 VITALS
SYSTOLIC BLOOD PRESSURE: 112 MMHG | BODY MASS INDEX: 22.82 KG/M2 | DIASTOLIC BLOOD PRESSURE: 64 MMHG | HEART RATE: 86 BPM | TEMPERATURE: 97.3 F | HEIGHT: 66 IN | WEIGHT: 142 LBS | OXYGEN SATURATION: 96 %

## 2025-08-27 DIAGNOSIS — E61.1 IRON DEFICIENCY: Primary | ICD-10-CM

## 2025-08-27 DIAGNOSIS — K22.70 BARRETT'S ESOPHAGUS WITHOUT DYSPLASIA: ICD-10-CM

## 2025-08-27 DIAGNOSIS — E11.49 TYPE 2 DIABETES MELLITUS WITH OTHER DIABETIC NEUROLOGICAL COMPLICATION: ICD-10-CM

## 2025-08-27 DIAGNOSIS — Z86.39 HISTORY OF HEMOCHROMATOSIS: ICD-10-CM

## (undated) DEVICE — ESOPHAGEAL BALLOON DILATATION CATHETER: Brand: CRE FIXED WIRE

## (undated) DEVICE — TUBING, SUCTION, 1/4" X 10', STRAIGHT: Brand: MEDLINE

## (undated) DEVICE — FRCP BX RADJAW4 NDL 2.8 240CM LG OG BX40

## (undated) DEVICE — CANN O2 ETCO2 FITS ALL CONN CO2 SMPL A/ 7IN DISP LF

## (undated) DEVICE — BLCK/BITE BLOX W/DENTL/RIM W/STRAP 54F

## (undated) DEVICE — LN SMPL CO2 SHTRM SD STREAM W/M LUER

## (undated) DEVICE — SENSR O2 OXIMAX FNGR A/ 18IN NONSTR

## (undated) DEVICE — KT ORCA ORCAPOD DISP STRL

## (undated) DEVICE — DEV INFL CRE STERIFLATE 60CC DISP

## (undated) DEVICE — ADAPT CLN BIOGUARD AIR/H2O DISP